# Patient Record
Sex: FEMALE | Race: WHITE | Employment: FULL TIME | URBAN - METROPOLITAN AREA
[De-identification: names, ages, dates, MRNs, and addresses within clinical notes are randomized per-mention and may not be internally consistent; named-entity substitution may affect disease eponyms.]

---

## 2017-02-07 DIAGNOSIS — J45.20 REACTIVE AIRWAY DISEASE, MILD INTERMITTENT, UNCOMPLICATED: ICD-10-CM

## 2017-02-08 RX ORDER — ALBUTEROL SULFATE 90 UG/1
2 AEROSOL, METERED RESPIRATORY (INHALATION) EVERY 6 HOURS PRN
Qty: 42.5 G | Refills: 1 | Status: SHIPPED | OUTPATIENT
Start: 2017-02-08 | End: 2017-11-17 | Stop reason: SDUPTHER

## 2017-03-13 DIAGNOSIS — F32.0 MILD SINGLE CURRENT EPISODE OF MAJOR DEPRESSIVE DISORDER (HCC): ICD-10-CM

## 2017-03-13 DIAGNOSIS — F32.A DEPRESSION: ICD-10-CM

## 2017-03-13 RX ORDER — DULOXETIN HYDROCHLORIDE 30 MG/1
CAPSULE, DELAYED RELEASE ORAL
Qty: 90 CAPSULE | Refills: 0 | Status: SHIPPED | OUTPATIENT
Start: 2017-03-13 | End: 2017-03-13

## 2017-03-13 RX ORDER — DULOXETIN HYDROCHLORIDE 60 MG/1
CAPSULE, DELAYED RELEASE ORAL
Qty: 90 CAPSULE | Refills: 0 | Status: SHIPPED | OUTPATIENT
Start: 2017-03-13 | End: 2017-06-12 | Stop reason: SDUPTHER

## 2017-03-24 ENCOUNTER — OFFICE VISIT (OUTPATIENT)
Dept: FAMILY MEDICINE CLINIC | Age: 37
End: 2017-03-24

## 2017-03-24 VITALS
SYSTOLIC BLOOD PRESSURE: 116 MMHG | BODY MASS INDEX: 40.66 KG/M2 | OXYGEN SATURATION: 95 % | WEIGHT: 253 LBS | TEMPERATURE: 97.8 F | HEART RATE: 128 BPM | DIASTOLIC BLOOD PRESSURE: 84 MMHG | HEIGHT: 66 IN

## 2017-03-24 DIAGNOSIS — E78.5 HYPERLIPIDEMIA, UNSPECIFIED HYPERLIPIDEMIA TYPE: ICD-10-CM

## 2017-03-24 DIAGNOSIS — Z30.011 ENCOUNTER FOR INITIAL PRESCRIPTION OF CONTRACEPTIVE PILLS: ICD-10-CM

## 2017-03-24 DIAGNOSIS — F32.0 MILD SINGLE CURRENT EPISODE OF MAJOR DEPRESSIVE DISORDER (HCC): ICD-10-CM

## 2017-03-24 DIAGNOSIS — J01.10 ACUTE FRONTAL SINUSITIS, RECURRENCE NOT SPECIFIED: Primary | ICD-10-CM

## 2017-03-24 PROCEDURE — 99214 OFFICE O/P EST MOD 30 MIN: CPT | Performed by: NURSE PRACTITIONER

## 2017-03-24 RX ORDER — AZITHROMYCIN 250 MG/1
TABLET, FILM COATED ORAL
Qty: 1 PACKET | Refills: 0 | Status: SHIPPED | OUTPATIENT
Start: 2017-03-24 | End: 2018-05-18 | Stop reason: SDUPTHER

## 2017-03-24 RX ORDER — NORETHINDRONE ACETATE AND ETHINYL ESTRADIOL 1MG-20(21)
1 KIT ORAL DAILY
Qty: 1 PACKET | Refills: 3 | Status: SHIPPED | OUTPATIENT
Start: 2017-03-24 | End: 2017-06-30 | Stop reason: ALTCHOICE

## 2017-03-24 ASSESSMENT — ENCOUNTER SYMPTOMS
SINUS PRESSURE: 1
SWOLLEN GLANDS: 0
GASTROINTESTINAL NEGATIVE: 1
SHORTNESS OF BREATH: 0
EYES NEGATIVE: 1
SORE THROAT: 0
HOARSE VOICE: 0
RESPIRATORY NEGATIVE: 1
COUGH: 0
ALLERGIC/IMMUNOLOGIC NEGATIVE: 1

## 2017-06-12 DIAGNOSIS — F32.A DEPRESSION: ICD-10-CM

## 2017-06-12 RX ORDER — DULOXETIN HYDROCHLORIDE 30 MG/1
CAPSULE, DELAYED RELEASE ORAL
Qty: 90 CAPSULE | Refills: 0 | Status: SHIPPED | OUTPATIENT
Start: 2017-06-12 | End: 2017-09-14 | Stop reason: SDUPTHER

## 2017-06-12 RX ORDER — DULOXETIN HYDROCHLORIDE 60 MG/1
CAPSULE, DELAYED RELEASE ORAL
Qty: 90 CAPSULE | Refills: 0 | Status: SHIPPED | OUTPATIENT
Start: 2017-06-12 | End: 2017-12-29 | Stop reason: ALTCHOICE

## 2017-06-30 ENCOUNTER — OFFICE VISIT (OUTPATIENT)
Dept: FAMILY MEDICINE CLINIC | Age: 37
End: 2017-06-30

## 2017-06-30 VITALS
OXYGEN SATURATION: 99 % | HEART RATE: 100 BPM | DIASTOLIC BLOOD PRESSURE: 72 MMHG | TEMPERATURE: 98.8 F | BODY MASS INDEX: 41.75 KG/M2 | SYSTOLIC BLOOD PRESSURE: 116 MMHG | WEIGHT: 259.8 LBS | HEIGHT: 66 IN

## 2017-06-30 DIAGNOSIS — F32.0 MILD SINGLE CURRENT EPISODE OF MAJOR DEPRESSIVE DISORDER (HCC): ICD-10-CM

## 2017-06-30 DIAGNOSIS — J30.9 ALLERGIC RHINITIS, UNSPECIFIED ALLERGIC RHINITIS TRIGGER, UNSPECIFIED RHINITIS SEASONALITY: ICD-10-CM

## 2017-06-30 DIAGNOSIS — R21 RASH: Primary | ICD-10-CM

## 2017-06-30 PROCEDURE — 99214 OFFICE O/P EST MOD 30 MIN: CPT | Performed by: NURSE PRACTITIONER

## 2017-06-30 RX ORDER — CLOTRIMAZOLE AND BETAMETHASONE DIPROPIONATE 10; .64 MG/G; MG/G
CREAM TOPICAL
Qty: 1 TUBE | Refills: 1 | Status: SHIPPED | OUTPATIENT
Start: 2017-06-30 | End: 2017-09-22 | Stop reason: SDUPTHER

## 2017-06-30 RX ORDER — MONTELUKAST SODIUM 10 MG/1
10 TABLET ORAL DAILY
Qty: 30 TABLET | Refills: 3 | Status: SHIPPED | OUTPATIENT
Start: 2017-06-30 | End: 2017-11-26 | Stop reason: SDUPTHER

## 2017-06-30 ASSESSMENT — ENCOUNTER SYMPTOMS
GASTROINTESTINAL NEGATIVE: 1
RHINORRHEA: 0
EYES NEGATIVE: 1
ALLERGIC/IMMUNOLOGIC NEGATIVE: 1
SHORTNESS OF BREATH: 0
EYE PAIN: 0
NAIL CHANGES: 0
VOMITING: 0
COUGH: 1
SORE THROAT: 0
DIARRHEA: 0

## 2017-07-03 ENCOUNTER — PATIENT MESSAGE (OUTPATIENT)
Dept: FAMILY MEDICINE CLINIC | Age: 37
End: 2017-07-03

## 2017-07-03 DIAGNOSIS — F32.0 MILD SINGLE CURRENT EPISODE OF MAJOR DEPRESSIVE DISORDER (HCC): Primary | ICD-10-CM

## 2017-09-14 DIAGNOSIS — F32.A DEPRESSION: ICD-10-CM

## 2017-09-14 RX ORDER — DULOXETIN HYDROCHLORIDE 30 MG/1
CAPSULE, DELAYED RELEASE ORAL
Qty: 90 CAPSULE | Refills: 0 | Status: SHIPPED | OUTPATIENT
Start: 2017-09-14 | End: 2017-12-26 | Stop reason: SDUPTHER

## 2017-09-14 RX ORDER — DULOXETIN HYDROCHLORIDE 60 MG/1
CAPSULE, DELAYED RELEASE ORAL
Qty: 90 CAPSULE | Refills: 0 | Status: SHIPPED | OUTPATIENT
Start: 2017-09-14 | End: 2017-09-22 | Stop reason: SDUPTHER

## 2017-09-22 ENCOUNTER — OFFICE VISIT (OUTPATIENT)
Dept: FAMILY MEDICINE CLINIC | Age: 37
End: 2017-09-22

## 2017-09-22 VITALS
SYSTOLIC BLOOD PRESSURE: 122 MMHG | OXYGEN SATURATION: 98 % | HEART RATE: 98 BPM | WEIGHT: 269 LBS | TEMPERATURE: 97.9 F | BODY MASS INDEX: 43.23 KG/M2 | DIASTOLIC BLOOD PRESSURE: 82 MMHG | HEIGHT: 66 IN

## 2017-09-22 DIAGNOSIS — J30.9 ALLERGIC RHINITIS, UNSPECIFIED ALLERGIC RHINITIS TRIGGER, UNSPECIFIED RHINITIS SEASONALITY: ICD-10-CM

## 2017-09-22 DIAGNOSIS — F32.0 MILD SINGLE CURRENT EPISODE OF MAJOR DEPRESSIVE DISORDER (HCC): Primary | ICD-10-CM

## 2017-09-22 DIAGNOSIS — E78.5 HYPERLIPIDEMIA, UNSPECIFIED HYPERLIPIDEMIA TYPE: ICD-10-CM

## 2017-09-22 PROCEDURE — 99214 OFFICE O/P EST MOD 30 MIN: CPT | Performed by: NURSE PRACTITIONER

## 2017-09-22 RX ORDER — CLOTRIMAZOLE AND BETAMETHASONE DIPROPIONATE 10; .64 MG/G; MG/G
CREAM TOPICAL
Qty: 1 TUBE | Refills: 1 | Status: SHIPPED | OUTPATIENT
Start: 2017-09-22 | End: 2022-02-17

## 2017-09-22 ASSESSMENT — ENCOUNTER SYMPTOMS
EYES NEGATIVE: 1
GASTROINTESTINAL NEGATIVE: 1
SHORTNESS OF BREATH: 0
RESPIRATORY NEGATIVE: 1

## 2017-10-10 ENCOUNTER — OFFICE VISIT (OUTPATIENT)
Dept: FAMILY MEDICINE CLINIC | Age: 37
End: 2017-10-10

## 2017-10-10 VITALS
DIASTOLIC BLOOD PRESSURE: 108 MMHG | HEIGHT: 66 IN | TEMPERATURE: 97.7 F | HEART RATE: 102 BPM | WEIGHT: 263.4 LBS | OXYGEN SATURATION: 97 % | SYSTOLIC BLOOD PRESSURE: 146 MMHG | BODY MASS INDEX: 42.33 KG/M2

## 2017-10-10 DIAGNOSIS — I15.9 SECONDARY HYPERTENSION: ICD-10-CM

## 2017-10-10 DIAGNOSIS — R51.9 NONINTRACTABLE HEADACHE, UNSPECIFIED CHRONICITY PATTERN, UNSPECIFIED HEADACHE TYPE: Primary | ICD-10-CM

## 2017-10-10 DIAGNOSIS — F41.9 ANXIETY: ICD-10-CM

## 2017-10-10 PROCEDURE — 99214 OFFICE O/P EST MOD 30 MIN: CPT | Performed by: NURSE PRACTITIONER

## 2017-10-10 RX ORDER — INFLUENZA VIRUS VACCINE 15; 15; 15; 15 UG/.5ML; UG/.5ML; UG/.5ML; UG/.5ML
SUSPENSION INTRAMUSCULAR
Refills: 0 | COMMUNITY
Start: 2017-09-27 | End: 2017-10-10 | Stop reason: ALTCHOICE

## 2017-10-10 RX ORDER — METOPROLOL SUCCINATE 25 MG/1
25 TABLET, EXTENDED RELEASE ORAL DAILY
Qty: 30 TABLET | Refills: 3 | Status: SHIPPED | OUTPATIENT
Start: 2017-10-10 | End: 2017-10-31

## 2017-10-10 RX ORDER — ACETAMINOPHEN 160 MG
2000 TABLET,DISINTEGRATING ORAL 2 TIMES DAILY
COMMUNITY
End: 2021-05-17

## 2017-10-10 RX ORDER — BUTALBITAL, ACETAMINOPHEN AND CAFFEINE 50; 325; 40 MG/1; MG/1; MG/1
1 TABLET ORAL EVERY 4 HOURS PRN
Qty: 180 TABLET | Refills: 3 | Status: SHIPPED | OUTPATIENT
Start: 2017-10-10 | End: 2021-08-13 | Stop reason: ALTCHOICE

## 2017-10-10 ASSESSMENT — ENCOUNTER SYMPTOMS
PHOTOPHOBIA: 0
SWOLLEN GLANDS: 0
SHORTNESS OF BREATH: 0
BACK PAIN: 0
RHINORRHEA: 0
VISUAL CHANGE: 0
EYE PAIN: 0
NAUSEA: 1
FACIAL SWEATING: 0
SCALP TENDERNESS: 1
SORE THROAT: 0
BLURRED VISION: 0
VOMITING: 0
EYES NEGATIVE: 1
COUGH: 0
SINUS PRESSURE: 0
ABDOMINAL PAIN: 0
EYE WATERING: 0
RESPIRATORY NEGATIVE: 1
ORTHOPNEA: 0
EYE REDNESS: 0
ALLERGIC/IMMUNOLOGIC NEGATIVE: 1

## 2017-10-10 NOTE — PROGRESS NOTES
10/10/17    Sang Santos  1980      Chief Complaint   Patient presents with    Headache     x 3 started 3 weeks ago     Hypertension    Anxiety       Vitals:    10/10/17 1142   BP: (!) 146/108   Pulse: 102   Temp:    SpO2:          Immunization History   Administered Date(s) Administered    Influenza Virus Vaccine 09/13/2011, 10/08/2012    Influenza, Deandre Pope, 3 Years and older, IM 09/21/2016, 09/27/2017    MMR 06/17/2015    Tdap (Boostrix, Adacel) 09/16/2014       Allergies   Allergen Reactions    Fexofenadine-Pseudoephed Er      The decongestant part    Penicillins     Sulfa Antibiotics      Outpatient Prescriptions Marked as Taking for the 10/10/17 encounter (Office Visit) with Jay Fisher NP   Medication Sig Dispense Refill    Fluticasone Propionate (FLONASE NA) by Nasal route      Cholecalciferol (VITAMIN D3) 2000 units CAPS Take 2,000 Units by mouth 2 times daily      metoprolol succinate (TOPROL XL) 25 MG extended release tablet Take 1 tablet by mouth daily 30 tablet 3    butalbital-acetaminophen-caffeine (FIORICET, ESGIC) -40 MG per tablet Take 1 tablet by mouth every 4 hours as needed for Headaches 180 tablet 3    clotrimazole-betamethasone (LOTRISONE) 1-0.05 % cream Apply topically 2 times daily. 1 Tube 1    DULoxetine (CYMBALTA) 30 MG extended release capsule TAKE ONE CAPSULE BY MOUTH EVERY DAY 90 capsule 0    montelukast (SINGULAIR) 10 MG tablet Take 1 tablet by mouth daily 30 tablet 3    DULoxetine (CYMBALTA) 60 MG extended release capsule TAKE 1 CAPSULE BY MOUTH DAILY 90 capsule 0    albuterol sulfate HFA (PROAIR HFA) 108 (90 BASE) MCG/ACT inhaler Inhale 2 puffs into the lungs every 6 hours as needed for Wheezing or Shortness of Breath 42.5 g 1    cetirizine (ZYRTEC) 10 MG tablet Take 10 mg by mouth daily.          Past Medical History:   Diagnosis Date    Allergic rhinitis     Asthma     Depression 5/19/2016    GERD (gastroesophageal reflux disease)     Pyloric stenosis      Past Surgical History:   Procedure Laterality Date    WISDOM TOOTH EXTRACTION  early 19's    x 4     Family History   Problem Relation Age of Onset    Diabetes Mother     High Cholesterol Mother     Cancer Father      Social History     Social History    Marital status:      Spouse name: N/A    Number of children: N/A    Years of education: N/A     Occupational History    Not on file. Social History Main Topics    Smoking status: Never Smoker    Smokeless tobacco: Never Used    Alcohol use No      Comment: occ    Drug use: No    Sexual activity: Yes     Partners: Male     Other Topics Concern    Not on file     Social History Narrative    No narrative on file         Any recent diagnostic tests, hospital reports, office notes or consultation letters were reviewed prior to and during the visit. Headache    This is a recurrent problem. The current episode started 1 to 4 weeks ago. The problem occurs intermittently. The pain is located in the frontal, vertex and retro-orbital region. The pain does not radiate. The quality of the pain is described as throbbing. The pain is at a severity of 8/10. Associated symptoms include nausea and scalp tenderness. Pertinent negatives include no abdominal pain, abnormal behavior, anorexia, back pain, blurred vision, coughing, dizziness, drainage, ear pain, eye pain, eye redness, eye watering, facial sweating, fever, hearing loss, insomnia, loss of balance, muscle aches, neck pain, numbness, phonophobia, photophobia, rhinorrhea, seizures, sinus pressure, sore throat, swollen glands, tingling, tinnitus, visual change, vomiting, weakness or weight loss. She has tried Excedrin for the symptoms. The treatment provided no relief. Hypertension   The current episode started today. The problem is unchanged. Associated symptoms include anxiety and headaches.  Pertinent negatives include no blurred vision, chest pain, malaise/fatigue, neck

## 2017-10-10 NOTE — LETTER
[facility]  [address] [place] [address]  Phone: [phone]  Fax: [phone]    Dalton Pope NP        October 10, 2017     Patient: Jace Wright   YOB: 1980   Date of Visit: 10/10/2017       To Whom It May Concern: It is my medical opinion that Colten Mckoy should remain out of work 10/9 - 10/11/2017 due to illness. If you have any questions or concerns, please don't hesitate to call.     Sincerely,        Dalton Pope NP

## 2017-10-27 ENCOUNTER — TELEPHONE (OUTPATIENT)
Dept: FAMILY MEDICINE CLINIC | Age: 37
End: 2017-10-27

## 2017-10-31 ENCOUNTER — OFFICE VISIT (OUTPATIENT)
Dept: FAMILY MEDICINE CLINIC | Age: 37
End: 2017-10-31

## 2017-10-31 VITALS
OXYGEN SATURATION: 98 % | HEIGHT: 66 IN | WEIGHT: 266.8 LBS | BODY MASS INDEX: 42.88 KG/M2 | DIASTOLIC BLOOD PRESSURE: 90 MMHG | SYSTOLIC BLOOD PRESSURE: 140 MMHG | TEMPERATURE: 98.9 F | HEART RATE: 107 BPM

## 2017-10-31 DIAGNOSIS — R00.2 PALPITATIONS: ICD-10-CM

## 2017-10-31 DIAGNOSIS — I10 HYPERTENSION, UNSPECIFIED TYPE: ICD-10-CM

## 2017-10-31 DIAGNOSIS — R51.9 NONINTRACTABLE HEADACHE, UNSPECIFIED CHRONICITY PATTERN, UNSPECIFIED HEADACHE TYPE: Primary | ICD-10-CM

## 2017-10-31 PROCEDURE — 99214 OFFICE O/P EST MOD 30 MIN: CPT | Performed by: NURSE PRACTITIONER

## 2017-10-31 RX ORDER — METOPROLOL SUCCINATE 50 MG/1
50 TABLET, EXTENDED RELEASE ORAL DAILY
Qty: 30 TABLET | Refills: 3 | Status: SHIPPED | OUTPATIENT
Start: 2017-10-31 | End: 2018-06-19

## 2017-11-01 PROBLEM — R00.2 PALPITATIONS: Status: ACTIVE | Noted: 2017-11-01

## 2017-11-01 PROBLEM — R51.9 NONINTRACTABLE HEADACHE: Status: ACTIVE | Noted: 2017-11-01

## 2017-11-01 PROBLEM — I10 HYPERTENSION: Status: ACTIVE | Noted: 2017-11-01

## 2017-11-01 ASSESSMENT — ENCOUNTER SYMPTOMS
ALLERGIC/IMMUNOLOGIC NEGATIVE: 1
SORE THROAT: 0
SWOLLEN GLANDS: 0
VISUAL CHANGE: 1
COUGH: 0
NAUSEA: 1
SINUS PRESSURE: 0
VOMITING: 0
SCALP TENDERNESS: 0
ORTHOPNEA: 0
RHINORRHEA: 0
ABDOMINAL PAIN: 0
RESPIRATORY NEGATIVE: 1
EYE WATERING: 0
EYE PAIN: 0
PHOTOPHOBIA: 1
SHORTNESS OF BREATH: 0
EYE REDNESS: 0
BLURRED VISION: 1
BACK PAIN: 0
FACIAL SWEATING: 0

## 2017-11-02 ENCOUNTER — TELEPHONE (OUTPATIENT)
Dept: FAMILY MEDICINE CLINIC | Age: 37
End: 2017-11-02

## 2017-11-05 NOTE — PROGRESS NOTES
I have reviewed the above note.  I agree with the assessment and treatment plan
stenosis      Past Surgical History:   Procedure Laterality Date    WISDOM TOOTH EXTRACTION  early 19's    x 4     Family History   Problem Relation Age of Onset    Diabetes Mother     High Cholesterol Mother     Cancer Father      Social History     Social History    Marital status:      Spouse name: N/A    Number of children: N/A    Years of education: N/A     Occupational History    Not on file. Social History Main Topics    Smoking status: Never Smoker    Smokeless tobacco: Never Used    Alcohol use No      Comment: occ    Drug use: No    Sexual activity: Yes     Partners: Male     Other Topics Concern    Not on file     Social History Narrative    No narrative on file         Any recent diagnostic tests, hospital reports, office notes or consultation letters were reviewed prior to and during the visit. Headache    This is a recurrent problem. The current episode started more than 1 month ago. The problem has been gradually improving. The pain is located in the frontal region. The pain does not radiate. The quality of the pain is described as aching and throbbing. Associated symptoms include blurred vision, dizziness, nausea, photophobia and a visual change. Pertinent negatives include no abdominal pain, abnormal behavior, anorexia, back pain, coughing, drainage, ear pain, eye pain, eye redness, eye watering, facial sweating, fever, hearing loss, insomnia, loss of balance, muscle aches, neck pain, numbness, phonophobia, rhinorrhea, scalp tenderness, seizures, sinus pressure, sore throat, swollen glands, tingling, tinnitus, vomiting, weakness or weight loss. She has tried beta blockers (fioricet) for the symptoms. The treatment provided moderate relief. Hypertension   This is a recurrent problem. The current episode started more than 1 month ago. Associated symptoms include blurred vision, headaches and palpitations.  Pertinent negatives include no anxiety, chest pain,

## 2017-11-07 ENCOUNTER — TELEPHONE (OUTPATIENT)
Dept: FAMILY MEDICINE CLINIC | Age: 37
End: 2017-11-07

## 2017-11-07 ENCOUNTER — TELEPHONE (OUTPATIENT)
Dept: PSYCHOLOGY | Age: 37
End: 2017-11-07

## 2017-11-07 ENCOUNTER — OFFICE VISIT (OUTPATIENT)
Dept: PSYCHOLOGY | Age: 37
End: 2017-11-07

## 2017-11-07 DIAGNOSIS — F33.1 MAJOR DEPRESSIVE DISORDER, RECURRENT EPISODE, MODERATE WITH ANXIOUS DISTRESS (HCC): Primary | ICD-10-CM

## 2017-11-07 DIAGNOSIS — F41.1 GENERALIZED ANXIETY DISORDER: ICD-10-CM

## 2017-11-07 PROCEDURE — 90791 PSYCH DIAGNOSTIC EVALUATION: CPT | Performed by: PSYCHOLOGIST

## 2017-11-07 RX ORDER — ESCITALOPRAM OXALATE 10 MG/1
10 TABLET ORAL DAILY
Qty: 30 TABLET | Refills: 3 | Status: SHIPPED | OUTPATIENT
Start: 2017-11-07 | End: 2018-01-05

## 2017-11-07 ASSESSMENT — PATIENT HEALTH QUESTIONNAIRE - PHQ9
1. LITTLE INTEREST OR PLEASURE IN DOING THINGS: 3
3. TROUBLE FALLING OR STAYING ASLEEP: 3
SUM OF ALL RESPONSES TO PHQ9 QUESTIONS 1 & 2: 5
9. THOUGHTS THAT YOU WOULD BE BETTER OFF DEAD, OR OF HURTING YOURSELF: 1
SUM OF ALL RESPONSES TO PHQ QUESTIONS 1-9: 19
5. POOR APPETITE OR OVEREATING: 2
2. FEELING DOWN, DEPRESSED OR HOPELESS: 2
4. FEELING TIRED OR HAVING LITTLE ENERGY: 3
6. FEELING BAD ABOUT YOURSELF - OR THAT YOU ARE A FAILURE OR HAVE LET YOURSELF OR YOUR FAMILY DOWN: 3
8. MOVING OR SPEAKING SO SLOWLY THAT OTHER PEOPLE COULD HAVE NOTICED. OR THE OPPOSITE, BEING SO FIGETY OR RESTLESS THAT YOU HAVE BEEN MOVING AROUND A LOT MORE THAN USUAL: 2

## 2017-11-07 ASSESSMENT — ANXIETY QUESTIONNAIRES
1. FEELING NERVOUS, ANXIOUS, OR ON EDGE: 2-OVER HALF THE DAYS
GAD7 TOTAL SCORE: 17
2. NOT BEING ABLE TO STOP OR CONTROL WORRYING: 3-NEARLY EVERY DAY
5. BEING SO RESTLESS THAT IT IS HARD TO SIT STILL: 2-OVER HALF THE DAYS
7. FEELING AFRAID AS IF SOMETHING AWFUL MIGHT HAPPEN: 2-OVER HALF THE DAYS
4. TROUBLE RELAXING: 3-NEARLY EVERY DAY
3. WORRYING TOO MUCH ABOUT DIFFERENT THINGS: 3-NEARLY EVERY DAY
6. BECOMING EASILY ANNOYED OR IRRITABLE: 2-OVER HALF THE DAYS

## 2017-11-07 NOTE — TELEPHONE ENCOUNTER
I will be happy to do that as long as she realizes that SSRIs are not know to help with pain as Cymbalta is.

## 2017-11-07 NOTE — PATIENT INSTRUCTIONS
Goals: 1. Go to work every day  2. Practice being mindful - paying attention to the present moment in a nonjudgmental way  3. Practice diaphragmatic breathing throughout the day  4. Go for walks (short or long) to manage stress        Depression: Facts, Myths & Tips for Feeling Better    Facts  Depression is very common  Nearly 20% of the [place] population experiences a significant depression during their lifetime. Depression is treatable  Because depression affects so many, and can have such a powerful and negative impact on life, there has been an enormous amount of research conducted on how to reduce symptoms and improve functioning. As a result, we now know there are behaviors YOU can engage in to make yourself feel better. Depression is not a weakness  People suffer from depression for a variety of reasons, biological, environmental and behavioral.  Research indicates that [employer] is NOT one of the reasons people become depressed. Depression is not something you are powerless against  Evidence suggests that you can directly impact the intensity and duration of depression by what you do and by altering the way you think about certain things. The Downward Spiral  Depression often begins as a drop in mood due to an environmental or biological trigger that makes people feel less like being active. Being less active, in turn, often causes people to experience an even lower mood and feel even less like being active, and so the cycle begins. 1. Low mood begins and you feel like doing less. 2. You stop doing the things you enjoy and become less active. 3. Your low mood becomes even lower and you feel like doing even less. 4. Cycle continues until you begin to completely isolate and cease all pleasurable activities OR you reverse the cycle. How Can I Start Feeling Better? The first and best way to reverse the downward cycle is to get active!  Your body produces its own anti-depressants every there is rarely one single cause of depression. It is probable that if you are depressed, many of the causes described above are playing some role to some degree. Therefore, the more coping strategies you adopt over time to address the various causes of depression described above, the more successful you will be in reducing your depression. MINDFULNESS    Mindfulness means paying attention in a particular way: on purpose, in the present moment, and non-judgmentally. Wicho Lawrence    \"Mindfulness is the basic human ability to be fully present, aware of where we are and what were doing, and not overly reactive or overwhelmed by whats going on around us. \"   -Mindful [place]    Paying attention on purpose  Mindfulness involves paying attention on purpose. Mindfulness involves a conscious direction of our awareness. This can mean purposefully directing our attention to the breath, or a particular emotion, or an activity as simple as eating. Doing so allows us to actively shape the mind. Paying attention in the present moment  Left to itself, the mind wanders through all kinds of thoughts  including thoughts expressing anger, craving, depression, self-pity, and anxiety. As we indulge in these kinds of thoughts, we reinforce those emotions and cause ourselves to suffer. These thoughts usually center around the past or future. But the past no longer exists. The future is just a fantasy until it happens. The one moment we actually can experience  the present moment  is the one we seem most to avoid. By purposefully directing our awareness away from thoughts about the past or future and instead towards the [address] - our present moment experience - we decrease the effect of these thoughts on our lives. Paying attention non-judgmentally  Mindfulness is an emotionally non-reactive state. We don't  that this experience is good and that one is bad.  Or, if we do make those judgments, we dont get upset in reaction to our experience. We simply notice it arising, observe it mindfully, and allow it to pass through us. When practicing mindfulness, we may be aware that certain experiences are pleasant and some are unpleasant, but on an emotional level we dont react. Resources  · \"Wherever You Go, There You Are: Mindfulness Meditation in Everyday Life\" - by Neeta Casillas  · \"The Miracle of Mindfulness: An Introduction to the Practice of Meditation\" - by Lu Aguirre  · \"The Power of Now: A Guide to Spiritual Enlightenment\" - by Lupis Tapia  · \"The Mindfulness Solution: Everyday Practices for Everyday Problems\" - by Josephine Casas    Ways to Practice Mindfulness  · Pay attention to your breathing  · Take a mindful walk  · Eat mindfully  · Ground yourself in your five senses  · Journal  · Try dishwashing, cleaning and doing laundry a little bit slower than you usually do  · Meditate      Diaphragmatic Breathing    \"The entire autonomic nervous system (and through it, our internal organs and glands) is largely driven by our breathing patterns. By changing our breathing we can influence millions of biochemical reactions in our body, producing more relaxing substances such as endorphins and fewer anxiety-producing ones like adrenaline and higher blood acidity. Mindfulness of the breath is so effective that it is common to all meditative and prayer traditions. \" Anxiety Fear & Breathing - Breathing. com    \"When overcoming high levels of anxiety, it is important to learn the techniques of correct breathing. Many people who live with high levels of anxiety are known to breathe through their chest. Shallow breathing through the chest means you are disrupting the balance of oxygen and carbon dioxide necessary to be in a relaxed state. This type of breathing will perpetuate the symptoms of anxiety. \" HealthyPlace. com      What Is Diaphragmatic Breathing?    Diaphragmatic breathing is a technique that helps

## 2017-11-07 NOTE — PROGRESS NOTES
Behavioral Health Consultation  Waqar Shultz Psy.D. Psychologist  2017  8:25 AM      Time spent with Patient: 40 minutes  This is patient's first [facility] appointment. Reason for Consult:  Depression, anxiety  Referring Provider: Randy Tobias, NP  [address] [address] [facility]  [place], [address]    Pt provided informed consent for the behavioral health program. Discussed with patient model of service to include the limits of confidentiality (i.e. abuse reporting, suicide intervention, etc.) and short-term intervention focused approach. Pt indicated understanding. Feedback given to PCP. S:  Pt Deon Drivers) reported that her depression has worsened over the past \"while. \" Saw Randy Tobias NP about depression sometime last year. Has been taking duloxetine 90mg. Initially gave her more energy but doesn't find it helpful anymore. Unhappy in marriage but having difficulty leaving. No longer feels compatible with  but feels responsible for him. Main depressive symptoms include fatigue, amotivation, increased isolation, and becoming more quiet. First depressive episode 2 years ago after she lost her job at [employer]. Has had some periods of euthymia since then. Has always been an anxious person. Likes to joke that she has OCD, very organized. More anxious since becoming depressed and changing jobs. Works at [employer] as a  in [employer]. Loves her job, but didn't have the energy or motivation to go to work 4 days last week. Parents are . Sister lives in [place]. Has 3 close friends from childhood, confided in them last year. Didn't feel as supported by these friends as she would have hoped. Also has a close male friend at work, feels supported by him. Has had thoughts about what it would like to go to sleep and not wake up. No SI/HI, plan or intent. No past attempts.       O:  MSE:  Appearance: good hygiene and appropriate attire  Attitude: cooperative and moderate distress  Consciousness: alert  Orientation: oriented to person, place, time, general circumstance  Memory: recent and remote memory intact  Attention/Concentration: intact during session  Psychomotor Activity: normal  Eye Contact: normal  Speech: normal rate and volume, well-articulated, verbose  Mood: dyshporic and anxious  Affect: constricted  Perception: within normal limits  Thought Content: within normal limits   Thought Process: logical, goal-directed, coherent, circumstantial  Insight: fair  Judgment: intact  Morbid ideation: passive thoughts of death  Suicide Assessment: no suicidal ideation      History:    Medications:   Current Outpatient Prescriptions   Medication Sig Dispense Refill    metoprolol succinate (TOPROL XL) 50 MG extended release tablet Take 1 tablet by mouth daily 30 tablet 3    Fluticasone Propionate (FLONASE NA) by Nasal route      Cholecalciferol (VITAMIN D3) 2000 units CAPS Take 2,000 Units by mouth 2 times daily      butalbital-acetaminophen-caffeine (FIORICET, ESGIC) -40 MG per tablet Take 1 tablet by mouth every 4 hours as needed for Headaches 180 tablet 3    clotrimazole-betamethasone (LOTRISONE) 1-0.05 % cream Apply topically 2 times daily. 1 Tube 1    DULoxetine (CYMBALTA) 30 MG extended release capsule TAKE ONE CAPSULE BY MOUTH EVERY DAY 90 capsule 0    montelukast (SINGULAIR) 10 MG tablet Take 1 tablet by mouth daily 30 tablet 3    DULoxetine (CYMBALTA) 60 MG extended release capsule TAKE 1 CAPSULE BY MOUTH DAILY 90 capsule 0    albuterol sulfate HFA (PROAIR HFA) 108 (90 BASE) MCG/ACT inhaler Inhale 2 puffs into the lungs every 6 hours as needed for Wheezing or Shortness of Breath 42.5 g 1    cetirizine (ZYRTEC) 10 MG tablet Take 10 mg by mouth daily. No current facility-administered medications for this visit.         Social History:   Social History     Social History    Marital status:      Spouse name: N/A    Number of children: N/A    Years of weeks.    Follow-Up  Will share recommendation to switch pt from Cymbalta to SSRI with pt's PCP.

## 2017-11-10 ENCOUNTER — TELEPHONE (OUTPATIENT)
Dept: FAMILY MEDICINE CLINIC | Age: 37
End: 2017-11-10

## 2017-11-10 NOTE — TELEPHONE ENCOUNTER
Spoke with pt and she stated ever since [place] put her on the allergy medication that she doesn't need to use her inhalers as much. Pt states she doesn't need 4 every 90 days that is what the pharmacy gave her last time. She has only used 3 inhalers and has 1 left but she wanted to let you know that she keeps her inhalers at different places incase of a flare like work, car, etc. Please Advise.

## 2017-11-10 NOTE — TELEPHONE ENCOUNTER
Please call the patient and see if she is using 4 every 90 days. That is WAY too much. She needs to be on a daily preventative is she needs her rescue inhaler that much.

## 2017-11-17 ENCOUNTER — OFFICE VISIT (OUTPATIENT)
Dept: PSYCHOLOGY | Age: 37
End: 2017-11-17

## 2017-11-17 DIAGNOSIS — F41.1 GENERALIZED ANXIETY DISORDER: ICD-10-CM

## 2017-11-17 DIAGNOSIS — F33.1 MAJOR DEPRESSIVE DISORDER, RECURRENT EPISODE, MODERATE WITH ANXIOUS DISTRESS (HCC): Primary | ICD-10-CM

## 2017-11-17 DIAGNOSIS — J45.20 REACTIVE AIRWAY DISEASE, MILD INTERMITTENT, UNCOMPLICATED: ICD-10-CM

## 2017-11-17 PROCEDURE — 90832 PSYTX W PT 30 MINUTES: CPT | Performed by: PSYCHOLOGIST

## 2017-11-17 RX ORDER — ALBUTEROL SULFATE 90 UG/1
2 AEROSOL, METERED RESPIRATORY (INHALATION) EVERY 6 HOURS PRN
Qty: 3 INHALER | Refills: 1 | Status: SHIPPED | OUTPATIENT
Start: 2017-11-17 | End: 2018-05-12 | Stop reason: SDUPTHER

## 2017-11-17 ASSESSMENT — ANXIETY QUESTIONNAIRES
1. FEELING NERVOUS, ANXIOUS, OR ON EDGE: 2-OVER HALF THE DAYS
3. WORRYING TOO MUCH ABOUT DIFFERENT THINGS: 2-OVER HALF THE DAYS
GAD7 TOTAL SCORE: 11
4. TROUBLE RELAXING: 2-OVER HALF THE DAYS
2. NOT BEING ABLE TO STOP OR CONTROL WORRYING: 2-OVER HALF THE DAYS
6. BECOMING EASILY ANNOYED OR IRRITABLE: 2-OVER HALF THE DAYS
7. FEELING AFRAID AS IF SOMETHING AWFUL MIGHT HAPPEN: 1-SEVERAL DAYS
5. BEING SO RESTLESS THAT IT IS HARD TO SIT STILL: 0-NOT AT ALL SURE

## 2017-11-17 ASSESSMENT — PATIENT HEALTH QUESTIONNAIRE - PHQ9
1. LITTLE INTEREST OR PLEASURE IN DOING THINGS: 1
3. TROUBLE FALLING OR STAYING ASLEEP: 2
SUM OF ALL RESPONSES TO PHQ QUESTIONS 1-9: 13
8. MOVING OR SPEAKING SO SLOWLY THAT OTHER PEOPLE COULD HAVE NOTICED. OR THE OPPOSITE, BEING SO FIGETY OR RESTLESS THAT YOU HAVE BEEN MOVING AROUND A LOT MORE THAN USUAL: 0
5. POOR APPETITE OR OVEREATING: 3
9. THOUGHTS THAT YOU WOULD BE BETTER OFF DEAD, OR OF HURTING YOURSELF: 0
6. FEELING BAD ABOUT YOURSELF - OR THAT YOU ARE A FAILURE OR HAVE LET YOURSELF OR YOUR FAMILY DOWN: 2
7. TROUBLE CONCENTRATING ON THINGS, SUCH AS READING THE NEWSPAPER OR WATCHING TELEVISION: 1
2. FEELING DOWN, DEPRESSED OR HOPELESS: 2
4. FEELING TIRED OR HAVING LITTLE ENERGY: 2
SUM OF ALL RESPONSES TO PHQ9 QUESTIONS 1 & 2: 3

## 2017-11-17 NOTE — PATIENT INSTRUCTIONS
Goals: 1. Go to work every day  2. Practice being mindful - paying attention to the present moment in a nonjudgmental way  3. Practice diaphragmatic breathing throughout the day  4. Go for walks (short or long) to manage stress. Also consider finding a gym you can use.   5. Complete ABC sheets to challenge negative thoughts

## 2017-11-17 NOTE — PROGRESS NOTES
Behavioral Health Consultation  Gladys Garza Psy.D. Psychologist  11/17/2017  8:29 AM      Time spent with Patient: 35 minutes  This is patient's second [facility] appointment. Reason for Consult:  Depression, anxiety  Referring Provider: Gillian Aaron, [place]Y  [address] [address] [facility], [address]      S:  Pt Ozella Files) reported feeling better today. Has fully switched from Cymbalta to Lexapro. Believes Lexapro is already starting to help her feel \"less foggy,\" clearer, more upbeat and social. Attended her niece's birthday party, which she enjoyed. Not napping as much. Hasn't walked because it's dark when she gets home from work. Considering joining a gym. Increased appetite lately. Starting to think about ways to improve her life but is receiving feedback from others telling her not to push herself too hard too quickly. Discussed her relationship with a male friend. Becoming more aware of her tendency to push others away in order to protect herself.        O:  MSE:  Appearance: good hygiene and appropriate attire  Attitude: cooperative and mild distress, brighter  Consciousness: alert  Orientation: oriented to person, place, time, general circumstance  Memory: recent and remote memory intact  Attention/Concentration: intact during session  Psychomotor Activity: normal  Eye Contact: normal  Speech: normal rate and volume, well-articulated, verbose  Mood: mildly dyshporic and anxious  Affect: congruent with thought content and mood  Perception: within normal limits  Thought Content: within normal limits   Thought Process: logical, goal-directed, coherent, circumstantial  Insight: improving  Judgment: intact  Morbid ideation: no  Suicide Assessment: no suicidal ideation      History:    Medications:   Current Outpatient Prescriptions   Medication Sig Dispense Refill    escitalopram (LEXAPRO) 10 MG tablet Take 1 tablet by mouth daily 30 tablet 3    metoprolol succinate (TOPROL XL) 50 MG extended release tablet Take 1 tablet by mouth daily 30 tablet 3    Fluticasone Propionate (FLONASE NA) by Nasal route      Cholecalciferol (VITAMIN D3) 2000 units CAPS Take 2,000 Units by mouth 2 times daily      butalbital-acetaminophen-caffeine (FIORICET, ESGIC) -40 MG per tablet Take 1 tablet by mouth every 4 hours as needed for Headaches 180 tablet 3    clotrimazole-betamethasone (LOTRISONE) 1-0.05 % cream Apply topically 2 times daily. 1 Tube 1    DULoxetine (CYMBALTA) 30 MG extended release capsule TAKE ONE CAPSULE BY MOUTH EVERY DAY 90 capsule 0    montelukast (SINGULAIR) 10 MG tablet Take 1 tablet by mouth daily 30 tablet 3    DULoxetine (CYMBALTA) 60 MG extended release capsule TAKE 1 CAPSULE BY MOUTH DAILY 90 capsule 0    albuterol sulfate HFA (PROAIR HFA) 108 (90 BASE) MCG/ACT inhaler Inhale 2 puffs into the lungs every 6 hours as needed for Wheezing or Shortness of Breath 42.5 g 1    cetirizine (ZYRTEC) 10 MG tablet Take 10 mg by mouth daily. No current facility-administered medications for this visit. Social History:   Social History     Social History    Marital status:      Spouse name: N/A    Number of children: N/A    Years of education: N/A     Occupational History    Not on file. Social History Main Topics    Smoking status: Never Smoker    Smokeless tobacco: Never Used    Alcohol use No      Comment: occ    Drug use: No    Sexual activity: Yes     Partners: Male     Other Topics Concern    Not on file     Social History Narrative    No narrative on file       TOBACCO:   reports that she has never smoked. She has never used smokeless tobacco.  ETOH:   reports that she does not drink alcohol. Family History:   Family History   Problem Relation Age of Onset    Diabetes Mother     High Cholesterol Mother     Cancer Father        A:  Pt's distress has decreased since last visit. Reporting increased energy level, motivation, and social engagement. Napping less. process, Cognitive strategies to target maladaptive thoughts including ABC sheets, Identified relevant behavioral strategies for targeting symptom reduction including going to work daily, walking/exercise, diaphragmatic breathing, ABC sheets and Emphasized importance of regular practice of relaxation strategies to target / promote stress mgmt. Pt Behavioral Change Plan:  Pt set the following goals:  1. Go to work every day  2. Practice being mindful - paying attention to the present moment in a nonjudgmental way  3. Practice diaphragmatic breathing throughout the day  4. Go for walks (short or long) to manage stress. Also consider finding a gym you can use. 5. Complete ABC sheets to challenge negative thoughts    Pt scheduled F/U visit in 2 weeks.

## 2017-11-26 DIAGNOSIS — J30.9 ALLERGIC RHINITIS: ICD-10-CM

## 2017-11-26 RX ORDER — MONTELUKAST SODIUM 10 MG/1
10 TABLET ORAL DAILY
Qty: 90 TABLET | Refills: 1 | Status: SHIPPED | OUTPATIENT
Start: 2017-11-26 | End: 2017-11-27 | Stop reason: SDUPTHER

## 2017-11-27 ENCOUNTER — OFFICE VISIT (OUTPATIENT)
Dept: FAMILY MEDICINE CLINIC | Age: 37
End: 2017-11-27

## 2017-11-27 VITALS
DIASTOLIC BLOOD PRESSURE: 82 MMHG | SYSTOLIC BLOOD PRESSURE: 120 MMHG | TEMPERATURE: 99 F | WEIGHT: 275.8 LBS | BODY MASS INDEX: 44.32 KG/M2 | HEART RATE: 110 BPM | OXYGEN SATURATION: 95 % | HEIGHT: 66 IN

## 2017-11-27 DIAGNOSIS — I10 HYPERTENSION, UNSPECIFIED TYPE: ICD-10-CM

## 2017-11-27 DIAGNOSIS — R51.9 NONINTRACTABLE HEADACHE, UNSPECIFIED CHRONICITY PATTERN, UNSPECIFIED HEADACHE TYPE: ICD-10-CM

## 2017-11-27 DIAGNOSIS — F32.0 MILD SINGLE CURRENT EPISODE OF MAJOR DEPRESSIVE DISORDER (HCC): Primary | ICD-10-CM

## 2017-11-27 RX ORDER — MONTELUKAST SODIUM 10 MG/1
10 TABLET ORAL DAILY
Qty: 90 TABLET | Refills: 1 | Status: SHIPPED | OUTPATIENT
Start: 2017-11-27 | End: 2018-05-28

## 2017-11-30 ASSESSMENT — ENCOUNTER SYMPTOMS
VOMITING: 0
SORE THROAT: 0
EYE PAIN: 0
EYE WATERING: 0
SCALP TENDERNESS: 0
PHOTOPHOBIA: 0
SHORTNESS OF BREATH: 0
RHINORRHEA: 0
BACK PAIN: 0
ALLERGIC/IMMUNOLOGIC NEGATIVE: 1
RESPIRATORY NEGATIVE: 1
BLURRED VISION: 0
FACIAL SWEATING: 0
EYES NEGATIVE: 1
SINUS PRESSURE: 0
VISUAL CHANGE: 0
GASTROINTESTINAL NEGATIVE: 1
COUGH: 0
SWOLLEN GLANDS: 0
ABDOMINAL PAIN: 0
NAUSEA: 0
EYE REDNESS: 0
ORTHOPNEA: 0

## 2017-11-30 NOTE — PROGRESS NOTES
11/30/17    Don Hughes  1980      Chief Complaint   Patient presents with    Headache    Hypertension    Depression       Vitals:    11/27/17 1047   BP: 120/82   Pulse: 110   Temp: 99 °F (37.2 °C)   SpO2: 95%         Immunization History   Administered Date(s) Administered    Influenza Virus Vaccine 09/13/2011, 10/08/2012    Influenza, Tracie Fink, 3 Years and older, IM 09/21/2016, 09/27/2017    MMR 06/17/2015    Tdap (Boostrix, Adacel) 09/16/2014       Allergies   Allergen Reactions    Fexofenadine-Pseudoephed Er      The decongestant part    Penicillins     Sulfa Antibiotics      Outpatient Prescriptions Marked as Taking for the 11/27/17 encounter (Office Visit) with Luis Fernando Pena NP   Medication Sig Dispense Refill    montelukast (SINGULAIR) 10 MG tablet Take 1 tablet by mouth daily 90 tablet 1    albuterol sulfate HFA (PROAIR HFA) 108 (90 Base) MCG/ACT inhaler Inhale 2 puffs into the lungs every 6 hours as needed for Wheezing or Shortness of Breath 3 Inhaler 1    escitalopram (LEXAPRO) 10 MG tablet Take 1 tablet by mouth daily 30 tablet 3    metoprolol succinate (TOPROL XL) 50 MG extended release tablet Take 1 tablet by mouth daily 30 tablet 3    Fluticasone Propionate (FLONASE NA) by Nasal route      Cholecalciferol (VITAMIN D3) 2000 units CAPS Take 2,000 Units by mouth 2 times daily      butalbital-acetaminophen-caffeine (FIORICET, ESGIC) -40 MG per tablet Take 1 tablet by mouth every 4 hours as needed for Headaches 180 tablet 3    clotrimazole-betamethasone (LOTRISONE) 1-0.05 % cream Apply topically 2 times daily. 1 Tube 1    cetirizine (ZYRTEC) 10 MG tablet Take 10 mg by mouth daily.          Past Medical History:   Diagnosis Date    Allergic rhinitis     Asthma     Depression 5/19/2016    GERD (gastroesophageal reflux disease)     Hypertension 11/1/2017    Major depressive disorder, recurrent episode, moderate with anxious distress ([facility] [address] 11/7/2017    Pyloric stenosis Past Surgical History:   Procedure Laterality Date    WISDOM TOOTH EXTRACTION  early 19's    x 4     Family History   Problem Relation Age of Onset    Diabetes Mother     High Cholesterol Mother     Cancer Father      Social History     Social History    Marital status:      Spouse name: N/A    Number of children: N/A    Years of education: N/A     Occupational History    Not on file. Social History Main Topics    Smoking status: Never Smoker    Smokeless tobacco: Never Used    Alcohol use No      Comment: occ    Drug use: No    Sexual activity: Yes     Partners: Male     Other Topics Concern    Not on file     Social History Narrative    No narrative on file         Any recent diagnostic tests, hospital reports, office notes or consultation letters were reviewed prior to and during the visit. Headache    This is a recurrent problem. The current episode started more than 1 month ago. The problem has been gradually improving. Pertinent negatives include no abdominal pain, abnormal behavior, anorexia, back pain, blurred vision, coughing, dizziness, drainage, ear pain, eye pain, eye redness, eye watering, facial sweating, fever, hearing loss, insomnia, loss of balance, muscle aches, nausea, neck pain, numbness, phonophobia, photophobia, rhinorrhea, scalp tenderness, seizures, sinus pressure, sore throat, swollen glands, tingling, tinnitus, visual change, vomiting, weakness or weight loss. She has tried beta blockers (fioricet) for the symptoms. The treatment provided significant relief. Hypertension   This is a recurrent problem. The current episode started more than 1 month ago. The problem is controlled. Pertinent negatives include no anxiety, blurred vision, chest pain, headaches, malaise/fatigue, neck pain, orthopnea, palpitations, peripheral edema, PND, shortness of breath or sweats. Past treatments include beta blockers.  The current treatment provides significant has no cervical adenopathy. Neurological: She is alert and oriented to person, place, and time. Skin: Skin is warm and dry. Psychiatric: She has a normal mood and affect. Her behavior is normal. Judgment and thought content normal.         1. Mild single current episode of major depressive disorder ([address]  The condition is improving. The patient is tolerating the treatment and meds. Continue all these and check labs as appropriate      2. Nonintractable headache, unspecified chronicity The condition is improving. The patient is tolerating the treatment and meds. Continue all these and check labs as appropriate   pattern, unspecified headache type     3. Hypertension, unspecified type  Condition stable continue the medications, treatments, will check labs as appropriate       The patient received counseling on the following healthy behaviors: nutrition, exercise, medication adherence and decrease in alcohol consumption    Patient given educational materials on Nutrition, Exercise and Hypertension as appropriate. I have instructed the patient to complete a self tracking handout on Blood Pressures  and instructed them to bring it with them to the next appointment. Discussed use, benefit, and side effects of prescribed medications. Barriers to medication compliance addressed. All patient questions answered. Pt voiced understanding. Patient Self-Management Goal for Chronic Condition  Goal: I will follow the diet recommendations provided by my doctor: low fat, low cholesterol, substitute healthy fats, such as olive oil, canola oil, grapeseed oil for saturated fats like butter, margarine, and shortening and reduce sodium intake. I will take all medications as prescribed by my doctor, and I will call the office if I am having any medication problems.   Barriers to success: none  Plan for overcoming my barriers: N/A     Confidence: 9/10  Date goal set: 11/30/17  Date goal attained:       Alex Mehta Wendy Anderson, NP

## 2017-12-01 ENCOUNTER — OFFICE VISIT (OUTPATIENT)
Dept: PSYCHOLOGY | Age: 37
End: 2017-12-01

## 2017-12-01 DIAGNOSIS — F33.0 MAJOR DEPRESSIVE DISORDER, RECURRENT EPISODE, MILD (HCC): Primary | ICD-10-CM

## 2017-12-01 DIAGNOSIS — F41.1 GENERALIZED ANXIETY DISORDER: ICD-10-CM

## 2017-12-01 PROCEDURE — 90832 PSYTX W PT 30 MINUTES: CPT | Performed by: PSYCHOLOGIST

## 2017-12-01 ASSESSMENT — PATIENT HEALTH QUESTIONNAIRE - PHQ9
SUM OF ALL RESPONSES TO PHQ QUESTIONS 1-9: 8
2. FEELING DOWN, DEPRESSED OR HOPELESS: 1
SUM OF ALL RESPONSES TO PHQ9 QUESTIONS 1 & 2: 2
8. MOVING OR SPEAKING SO SLOWLY THAT OTHER PEOPLE COULD HAVE NOTICED. OR THE OPPOSITE, BEING SO FIGETY OR RESTLESS THAT YOU HAVE BEEN MOVING AROUND A LOT MORE THAN USUAL: 0
4. FEELING TIRED OR HAVING LITTLE ENERGY: 1
7. TROUBLE CONCENTRATING ON THINGS, SUCH AS READING THE NEWSPAPER OR WATCHING TELEVISION: 0
3. TROUBLE FALLING OR STAYING ASLEEP: 2
9. THOUGHTS THAT YOU WOULD BE BETTER OFF DEAD, OR OF HURTING YOURSELF: 0
6. FEELING BAD ABOUT YOURSELF - OR THAT YOU ARE A FAILURE OR HAVE LET YOURSELF OR YOUR FAMILY DOWN: 1
5. POOR APPETITE OR OVEREATING: 2
1. LITTLE INTEREST OR PLEASURE IN DOING THINGS: 1

## 2017-12-01 ASSESSMENT — ANXIETY QUESTIONNAIRES
5. BEING SO RESTLESS THAT IT IS HARD TO SIT STILL: 0-NOT AT ALL SURE
4. TROUBLE RELAXING: 1-SEVERAL DAYS
1. FEELING NERVOUS, ANXIOUS, OR ON EDGE: 1-SEVERAL DAYS
7. FEELING AFRAID AS IF SOMETHING AWFUL MIGHT HAPPEN: 0-NOT AT ALL SURE
6. BECOMING EASILY ANNOYED OR IRRITABLE: 1-SEVERAL DAYS
3. WORRYING TOO MUCH ABOUT DIFFERENT THINGS: 0-NOT AT ALL SURE
2. NOT BEING ABLE TO STOP OR CONTROL WORRYING: 1-SEVERAL DAYS
GAD7 TOTAL SCORE: 4

## 2017-12-01 NOTE — PATIENT INSTRUCTIONS
stomach. 4. Chest:  Take a very deep breath (through your upper chest, not your diaphragm) and hold it. 5. Shoulders:  Lift both shoulders up toward your ears. 6. Back of Neck:  Tuck in and lower your chin toward your chest.    7. Eyes:  Squint. 8. Forehead:  Raise your eyebrows. Sleep Hygiene Guidelines    Good dental hygiene is important in determining the health of your teeth and gums. We all know we are supposed to brush and floss regularly. Those who do so are more likely to have strong, healthy gums and less cavities. Similarly, good sleep hygiene is important in determining the quality and quantity of your sleep. Below are guidelines for good sleep hygiene practices. Review these guidelines and evaluate how well you practice good sleep hygiene. Caffeine:  Avoid Caffeine After 10AM  Caffeine disturbs sleep, even in people who do not think they experience a stimulation effect. Individuals with insomnia are often more sensitive to mild stimulants than are normal sleepers. Caffeine is found in items such as coffee, tea, soda, chocolate, and many over-the-counter medications (e.g., Excedrin). Its effects can last up to 12 hours. Thus, drinking caffeinated beverages should be avoided after 10AM, and should be limited to no more than two cups. You might consider a trial period of no caffeine if you tend to be sensitive to its effects. Nicotine:  Avoid Nicotine Before Bedtime  Although some smokers claim that smoking helps them relax, nicotine is a stimulant. The relaxing effects occur with the initial entry of the nicotine, but as the nicotine builds in the system it produces an effect similar to caffeine. Thus, smoking, dipping, or chewing tobacco should be avoided near bedtime and during the night. Dont smoke to get yourself back to sleep.     Alcohol:  Avoid Alcohol After Dinner  Alcohol often promotes the onset of sleep, but as alcohol is metabolized sleep becomes disturbed additional hour. Set the alarm clock and get out of bed at the same time each morning, weekdays and weekends, regardless of your bedtime or the amount of sleep you obtained on the previous night. You probably will be tempted to stay in bed in the morning if you did not sleep well, but try to maintain your new schedule. This guideline is designed to regulate your internal biological clock and reset your sleep-wake rhythm. Go to Bed Only When You Are Sleepy  There is no reason to go to bed if you are not sleepy. When you go to bed too early, it only gives you more time to become frustrated with your inability to sleep. Individuals often ponder the events of the day, plan the next day's schedule, or worry about their inability to fall asleep. You should therefore delay your bedtime until you are sleepy. This may mean that you go to bed even later than your scheduled bedtime. Remember to stick to your scheduled arising time regardless of the time you go to bed. Get Out of Bed When You Can't Fall Asleep or Go Back to Sleep in About 20-30 Minutes. Return to Bed Only When You Are Sleepy. Repeat This Step as Often as Necessary. Too much time in bed can decrease the sleep quality on subsequent nights and contribute to the maintenance of existing sleep problems. Dont lay in bed for extended times while awake. Although we don't want you to be a clock-watcher, get out of bed if you don't fall asleep fairly soon (i.e., 20-30 minutes). Use this time to engage in a quiet activity (e.g., reading by a soft light). Return to bed only when you are sleepy (e.g., yawning, head bobbing, eyes closing, concentration decreasing). Dont confuse tiredness with sleepiness; they are different. Tiredness doesnt lead to sleep, only sleepiness does. The object is for you to reconnect your bed with sleeping rather than frustration.  It will be demanding to follow this instruction, but many people have found ways to adhere to this guideline. Use the Bed or Bedroom for Sleep and Sex Only. Do NOT Watch TV, Listen to the Radio, Eat, or Read in Your Bedroom. The purpose of this guideline is to associate your bedroom with sleep rather than wakefulness. Just as you may associate the kitchen with hunger, this guideline will help you associate sleep with your bedroom. Follow this rule both during the day and at night. You may have to temporarily move the TV or radio from your bedroom to help you during treatment. If you have difficulty falling asleep without background noise, you can use neutral noise (e.g., white noise machine, fan) to help you fall asleep. If you must use music or the television, recognize that this noise may help you fall asleep but can actually disrupt your sleep later on. Set a timer so the noise will end after 45 minutes. Do Not Nap During the Day. Most sleep experts agree that daytime napping almost always disrupts sleep rhythm, resulting in lighter, more restless sleep, difficulty falling asleep, or early morning awakening. This guideline will help your body acquire a consistent sleep rhythm so that you feel drowsy and ready to sleep at about the same time each night. If you must nap, keep it brief, and take the nap about 8 hours after arising (no later than 4PM). It is best to set an alarm to ensure you dont sleep more than 30 minutes.

## 2017-12-12 ENCOUNTER — OFFICE VISIT (OUTPATIENT)
Dept: PSYCHOLOGY | Age: 37
End: 2017-12-12

## 2017-12-12 DIAGNOSIS — F33.0 MAJOR DEPRESSIVE DISORDER, RECURRENT EPISODE, MILD (HCC): ICD-10-CM

## 2017-12-12 DIAGNOSIS — F41.1 GENERALIZED ANXIETY DISORDER: Primary | ICD-10-CM

## 2017-12-12 PROCEDURE — 90832 PSYTX W PT 30 MINUTES: CPT | Performed by: PSYCHOLOGIST

## 2017-12-12 ASSESSMENT — PATIENT HEALTH QUESTIONNAIRE - PHQ9
SUM OF ALL RESPONSES TO PHQ QUESTIONS 1-9: 6
SUM OF ALL RESPONSES TO PHQ9 QUESTIONS 1 & 2: 2
7. TROUBLE CONCENTRATING ON THINGS, SUCH AS READING THE NEWSPAPER OR WATCHING TELEVISION: 1
6. FEELING BAD ABOUT YOURSELF - OR THAT YOU ARE A FAILURE OR HAVE LET YOURSELF OR YOUR FAMILY DOWN: 1
4. FEELING TIRED OR HAVING LITTLE ENERGY: 1
1. LITTLE INTEREST OR PLEASURE IN DOING THINGS: 1
9. THOUGHTS THAT YOU WOULD BE BETTER OFF DEAD, OR OF HURTING YOURSELF: 0
8. MOVING OR SPEAKING SO SLOWLY THAT OTHER PEOPLE COULD HAVE NOTICED. OR THE OPPOSITE, BEING SO FIGETY OR RESTLESS THAT YOU HAVE BEEN MOVING AROUND A LOT MORE THAN USUAL: 0
5. POOR APPETITE OR OVEREATING: 0
3. TROUBLE FALLING OR STAYING ASLEEP: 1
2. FEELING DOWN, DEPRESSED OR HOPELESS: 1

## 2017-12-12 ASSESSMENT — ANXIETY QUESTIONNAIRES
2. NOT BEING ABLE TO STOP OR CONTROL WORRYING: 1-SEVERAL DAYS
4. TROUBLE RELAXING: 2-OVER HALF THE DAYS
GAD7 TOTAL SCORE: 11
7. FEELING AFRAID AS IF SOMETHING AWFUL MIGHT HAPPEN: 1-SEVERAL DAYS
5. BEING SO RESTLESS THAT IT IS HARD TO SIT STILL: 2-OVER HALF THE DAYS
1. FEELING NERVOUS, ANXIOUS, OR ON EDGE: 1-SEVERAL DAYS
3. WORRYING TOO MUCH ABOUT DIFFERENT THINGS: 2-OVER HALF THE DAYS
6. BECOMING EASILY ANNOYED OR IRRITABLE: 2-OVER HALF THE DAYS

## 2017-12-12 NOTE — PATIENT INSTRUCTIONS
Goals: 1. Go to work every day  2. Practice being mindful - paying attention to the present moment in a nonjudgmental way  3. Practice diaphragmatic breathing throughout the day  4. Go for walks (short or long) to manage stress. Also consider finding a gym you can use. 5. Complete ABC sheets to challenge negative thoughts  6. If you cannot fall asleep after 15-20 minutes, get out of bed and engage in a relaxing activity (e.g., listen to soft music, read by a soft light) until you feel drowsy. 7. Practice progressive muscle relaxation for 5 minutes daily  8. Try visualization exercises (e.g., taking a trip in your mind) during which you focus on what your senses would experience in that place  9. Continue using the Calm radha      [facility]  Locations in [place], [facility], [place], [facility], [address] [place], [place] [address], [facility] E Mayra Skaggs free number: [phone]  [email or web address]    [facility]  Jasmine Whittington OdellDony cole ([facility] practice)  [phone]  [email or web address]      Connected with:   [facility]- PSYCHIATRY & ADDICTIVE MED  [address] [place] [address] Critical access hospital [address] [place] [address]  [email or web address]

## 2017-12-12 NOTE — PROGRESS NOTES
Behavioral Health Consultation  Gladys Garza Psy.D. Psychologist  12/12/2017  8:30 AM      Time spent with Patient: 30 minutes  This is patient's fourth [facility] appointment. Reason for Consult:  Depression, anxiety  Referring Provider: MARICHUY Baer 46  [address], [address]      S:  Pt Ozella Files) reported that she's been preparing to move out of her home with her  and into a new space by herself. Concerned about how her  will handle being on his own. Has been working on communicating her feelings and plans more effectively. Has been using Calm radha, finds it very helpful. Feels more supported by female friends lately. Reports mild increase in shoulder nerve pain since switching from Cymbalta to Lexapro, but pleased with improvement in her mood and anxiety. Feels stable on Lexapro 10mg at this time.       O:  MSE:  Appearance: good hygiene and appropriate attire  Attitude: cooperative and mild distress   Consciousness: alert  Orientation: oriented to person, place, time, general circumstance  Memory: recent and remote memory intact  Attention/Concentration: intact during session  Psychomotor Activity: normal  Eye Contact: normal  Speech: normal rate and volume, well-articulated, verbose  Mood: anxious  Affect: congruent with thought content and mood  Perception: within normal limits  Thought Content: within normal limits   Thought Process: logical, goal-directed, coherent, circumstantial  Insight: improving  Judgment: intact  Morbid ideation: no  Suicide Assessment: no suicidal ideation      History:    Medications:   Current Outpatient Prescriptions   Medication Sig Dispense Refill    montelukast (SINGULAIR) 10 MG tablet Take 1 tablet by mouth daily 90 tablet 1    albuterol sulfate HFA (PROAIR HFA) 108 (90 Base) MCG/ACT inhaler Inhale 2 puffs into the lungs every 6 hours as needed for Wheezing or Shortness of Breath 3 Inhaler 1    escitalopram (LEXAPRO) 10 MG tablet Take 1 tablet by mouth daily 30 tablet 3    metoprolol succinate (TOPROL XL) 50 MG extended release tablet Take 1 tablet by mouth daily 30 tablet 3    Fluticasone Propionate (FLONASE NA) by Nasal route      Cholecalciferol (VITAMIN D3) 2000 units CAPS Take 2,000 Units by mouth 2 times daily      butalbital-acetaminophen-caffeine (FIORICET, ESGIC) -40 MG per tablet Take 1 tablet by mouth every 4 hours as needed for Headaches 180 tablet 3    clotrimazole-betamethasone (LOTRISONE) 1-0.05 % cream Apply topically 2 times daily. 1 Tube 1    DULoxetine (CYMBALTA) 30 MG extended release capsule TAKE ONE CAPSULE BY MOUTH EVERY DAY 90 capsule 0    DULoxetine (CYMBALTA) 60 MG extended release capsule TAKE 1 CAPSULE BY MOUTH DAILY 90 capsule 0    cetirizine (ZYRTEC) 10 MG tablet Take 10 mg by mouth daily. No current facility-administered medications for this visit. Social History:   Social History     Social History    Marital status:      Spouse name: N/A    Number of children: N/A    Years of education: N/A     Occupational History    Not on file. Social History Main Topics    Smoking status: Never Smoker    Smokeless tobacco: Never Used    Alcohol use No      Comment: occ    Drug use: No    Sexual activity: Yes     Partners: Male     Other Topics Concern    Not on file     Social History Narrative    No narrative on file       TOBACCO:   reports that she has never smoked. She has never used smokeless tobacco.  ETOH:   reports that she does not drink alcohol. Family History:   Family History   Problem Relation Age of Onset    Diabetes Mother     High Cholesterol Mother     Cancer Father        A:  Pt's depressive symptoms continue to trend downward. Anxiety has been higher lateley due to psychosocial stressors related to  from her  and moving, but pt has been handling this well. Perceived social support has increased lately.  Reinforced pt's use of the Calm radha to meditate at night. Explored pt's ambivalence about the changes she is making and the future. Reinforced her progress with self-care and cognitive restructuring. Pt feels stable on Lexapro 10mg for now. No safety concerns at this time. Pt is aware of this writer's upcoming maternity leave. She accepted a referral for traditional psychotherapy in the community. GARY 7 SCORE 12/12/2017 12/1/2017 11/17/2017 11/7/2017   GARY-7 Total Score 11 4 11 17     Interpretation of GARY-7 score: 5-9 = mild anxiety, 10-14 = moderate anxiety, 15+ = severe anxiety. Recommend referral to behavioral health for scores 10 or greater. PHQ Scores 12/12/2017 12/1/2017 11/17/2017 11/7/2017   PHQ2 Score 2 2 3 5   PHQ9 Score 6 8 13 19     Interpretation of Total Score Depression Severity: 1-4 = Minimal depression, 5-9 = Mild depression, 10-14 = Moderate depression, 15-19 = Moderately severe depression, 20-27 = Severe depression    Diagnosis:    1. Generalized anxiety disorder    2.  Major depressive disorder, recurrent episode, mild (HCC)        Patient Active Problem List   Diagnosis    Allergic rhinitis    Hyperglycemia    Hyperlipidemia    Vitamin D deficiency    Neoplasm of uncertain behavior of skin    GERD (gastroesophageal reflux disease)    Cervical disc herniation    Depression    Allergic rhinitis due to pollen    Mild single current episode of major depressive disorder (HCC)    Nonintractable headache    Palpitations    Hypertension    Major depressive disorder, recurrent episode, moderate with anxious distress (HCC)    Generalized anxiety disorder         Plan:  Pt interventions:  Supportive techniques, Emphasized self-care as important for managing overall health, Identified maladaptive thoughts, Identified relevant behavioral strategies for targeting symptom reduction including going to work daily, walking/exercise, diaphragmatic breathing, ABC sheets, PMR, visualization, meditating with Calm radha, Lexapro 10mg, Emphasized importance of regular practice of relaxation strategies to target / promote stress mgmt and Reviewed options for identifying appropriate providers. Pt Behavioral Change Plan:  Pt set the following goals:  1. Go to work every day  2. Practice being mindful - paying attention to the present moment in a nonjudgmental way  3. Practice diaphragmatic breathing throughout the day  4. Go for walks (short or long) to manage stress. Also consider finding a gym you can use. 5. Complete ABC sheets to challenge negative thoughts  6. If you cannot fall asleep after 15-20 minutes, get out of bed and engage in a relaxing activity (e.g., listen to soft music, read by a soft light) until you feel drowsy. 7. Practice progressive muscle relaxation for 5 minutes daily  8. Try visualization exercises (e.g., taking a trip in your mind) during which you focus on what your senses would experience in that place  9. Continue using the Calm radha    Pt was referred for traditional psychotherapy in the community. No F/U  visits are scheduled at this time, although pt is aware that he may return in the future as needed.

## 2017-12-14 ENCOUNTER — TELEPHONE (OUTPATIENT)
Dept: FAMILY MEDICINE CLINIC | Age: 37
End: 2017-12-14

## 2017-12-14 NOTE — TELEPHONE ENCOUNTER
Pt calling regarding FMLA paperwork. States she is having issue the frequency. Form only indicated one day a week. Sometimes, her migraines last more than one day, so she has been taking more and employer has denied those additional days. States the form would need to be amended to indicate more days per week. Says the form on file in chart can be updated and resent. Pt requesting it be changed to 3-4 days per week.

## 2017-12-26 DIAGNOSIS — F32.A DEPRESSION: ICD-10-CM

## 2017-12-26 RX ORDER — DULOXETIN HYDROCHLORIDE 60 MG/1
CAPSULE, DELAYED RELEASE ORAL
Qty: 90 CAPSULE | Refills: 1 | Status: SHIPPED | OUTPATIENT
Start: 2017-12-26 | End: 2017-12-29 | Stop reason: ALTCHOICE

## 2017-12-26 RX ORDER — DULOXETIN HYDROCHLORIDE 30 MG/1
CAPSULE, DELAYED RELEASE ORAL
Qty: 90 CAPSULE | Refills: 1 | Status: SHIPPED | OUTPATIENT
Start: 2017-12-26 | End: 2017-12-29 | Stop reason: ALTCHOICE

## 2017-12-29 ENCOUNTER — OFFICE VISIT (OUTPATIENT)
Dept: FAMILY MEDICINE CLINIC | Age: 37
End: 2017-12-29

## 2017-12-29 VITALS
DIASTOLIC BLOOD PRESSURE: 86 MMHG | TEMPERATURE: 98.4 F | SYSTOLIC BLOOD PRESSURE: 124 MMHG | WEIGHT: 277.8 LBS | BODY MASS INDEX: 44.84 KG/M2 | OXYGEN SATURATION: 96 % | HEART RATE: 105 BPM

## 2017-12-29 DIAGNOSIS — I10 ESSENTIAL HYPERTENSION: ICD-10-CM

## 2017-12-29 DIAGNOSIS — F32.0 MILD SINGLE CURRENT EPISODE OF MAJOR DEPRESSIVE DISORDER (HCC): Primary | ICD-10-CM

## 2017-12-29 DIAGNOSIS — J30.9 ALLERGIC RHINITIS, UNSPECIFIED CHRONICITY, UNSPECIFIED SEASONALITY, UNSPECIFIED TRIGGER: ICD-10-CM

## 2018-01-04 ENCOUNTER — TELEPHONE (OUTPATIENT)
Dept: FAMILY MEDICINE CLINIC | Age: 38
End: 2018-01-04

## 2018-01-05 RX ORDER — METOPROLOL TARTRATE 50 MG/1
50 TABLET, FILM COATED ORAL 2 TIMES DAILY
Qty: 180 TABLET | Refills: 1 | OUTPATIENT
Start: 2018-01-05 | End: 2018-07-01 | Stop reason: SDUPTHER

## 2018-01-05 RX ORDER — ESCITALOPRAM OXALATE 20 MG/1
20 TABLET ORAL DAILY
Qty: 90 TABLET | Refills: 1 | OUTPATIENT
Start: 2018-01-05 | End: 2018-07-01 | Stop reason: SDUPTHER

## 2018-01-10 ASSESSMENT — ENCOUNTER SYMPTOMS
BLURRED VISION: 0
RESPIRATORY NEGATIVE: 1
EYES NEGATIVE: 1
ALLERGIC/IMMUNOLOGIC NEGATIVE: 1
SHORTNESS OF BREATH: 0
GASTROINTESTINAL NEGATIVE: 1
ORTHOPNEA: 0

## 2018-01-10 NOTE — PROGRESS NOTES
1/10/18    Deri Bend  1980      Chief Complaint   Patient presents with    Hypertension    Depression    Allergic Rhinitis        Vitals:    12/29/17 0952   BP: 124/86   Pulse: 105   Temp: 98.4 °F (36.9 °C)   SpO2: 96%         Immunization History   Administered Date(s) Administered    Influenza Virus Vaccine 09/13/2011, 10/08/2012    Influenza, Chao Chandler, 3 Years and older, IM 09/21/2016, 09/27/2017    MMR 06/17/2015    Tdap (Boostrix, Adacel) 09/16/2014       Allergies   Allergen Reactions    Fexofenadine-Pseudoephed Er      The decongestant part    Penicillins     Sulfa Antibiotics      Outpatient Prescriptions Marked as Taking for the 12/29/17 encounter (Office Visit) with Danielle Mcghee NP   Medication Sig Dispense Refill    montelukast (SINGULAIR) 10 MG tablet Take 1 tablet by mouth daily 90 tablet 1    albuterol sulfate HFA (PROAIR HFA) 108 (90 Base) MCG/ACT inhaler Inhale 2 puffs into the lungs every 6 hours as needed for Wheezing or Shortness of Breath 3 Inhaler 1    [DISCONTINUED] escitalopram (LEXAPRO) 10 MG tablet Take 1 tablet by mouth daily 30 tablet 3    metoprolol succinate (TOPROL XL) 50 MG extended release tablet Take 1 tablet by mouth daily 30 tablet 3    Fluticasone Propionate (FLONASE NA) by Nasal route      Cholecalciferol (VITAMIN D3) 2000 units CAPS Take 2,000 Units by mouth 2 times daily      butalbital-acetaminophen-caffeine (FIORICET, ESGIC) -40 MG per tablet Take 1 tablet by mouth every 4 hours as needed for Headaches 180 tablet 3    clotrimazole-betamethasone (LOTRISONE) 1-0.05 % cream Apply topically 2 times daily. 1 Tube 1    cetirizine (ZYRTEC) 10 MG tablet Take 10 mg by mouth daily.          Past Medical History:   Diagnosis Date    Allergic rhinitis     Asthma     Depression 5/19/2016    GERD (gastroesophageal reflux disease)     Hypertension 11/1/2017    Major depressive disorder, recurrent episode, moderate with anxious distress ([facility] [address] 11/7/2017    Pyloric stenosis      Past Surgical History:   Procedure Laterality Date    WISDOM TOOTH EXTRACTION  early 19's    x 4     Family History   Problem Relation Age of Onset    Diabetes Mother     High Cholesterol Mother     Cancer Father      Social History     Social History    Marital status:      Spouse name: N/A    Number of children: N/A    Years of education: N/A     Occupational History    Not on file. Social History Main Topics    Smoking status: Never Smoker    Smokeless tobacco: Never Used    Alcohol use No      Comment: occ    Drug use: No    Sexual activity: Yes     Partners: Male     Other Topics Concern    Not on file     Social History Narrative    No narrative on file         Any recent diagnostic tests, hospital reports, office notes or consultation letters were reviewed prior to and during the visit. Hypertension   This is a chronic problem. Pertinent negatives include no blurred vision, chest pain, headaches, malaise/fatigue, neck pain, orthopnea, palpitations, peripheral edema, PND, shortness of breath or sweats. Past treatments include beta blockers. Depression: Patient complains of depression. She complains of anhedonia and depressed mood. Onset was approximately a few months ago, gradually worsening since that time. She denies current suicidal and homicidal plan or intent. Family history significant for no psychiatric illness. Possible organic causes contributing are: none. Risk factors: negative life event  from  Previous treatment includes Lexapro and individual therapy. She complains of the following side effects from the treatment: none. Allergic Rhinitis  Patient presents for evaluation and treatment of allergic symptoms. Symptoms include: clear rhinorrhea, cough, nasal congestion and postnasal drip and are present in a seasonal pattern.  Precipitants include: environmental. Treatment currently includes intranasal steroids:

## 2018-01-26 ENCOUNTER — OFFICE VISIT (OUTPATIENT)
Dept: FAMILY MEDICINE CLINIC | Age: 38
End: 2018-01-26

## 2018-01-26 VITALS
WEIGHT: 278.2 LBS | BODY MASS INDEX: 44.71 KG/M2 | SYSTOLIC BLOOD PRESSURE: 116 MMHG | TEMPERATURE: 98.6 F | HEIGHT: 66 IN | DIASTOLIC BLOOD PRESSURE: 80 MMHG

## 2018-01-26 DIAGNOSIS — E78.2 MIXED HYPERLIPIDEMIA: ICD-10-CM

## 2018-01-26 DIAGNOSIS — R73.9 HYPERGLYCEMIA: ICD-10-CM

## 2018-01-26 DIAGNOSIS — F32.0 MILD SINGLE CURRENT EPISODE OF MAJOR DEPRESSIVE DISORDER (HCC): ICD-10-CM

## 2018-01-26 DIAGNOSIS — K21.9 GASTROESOPHAGEAL REFLUX DISEASE, ESOPHAGITIS PRESENCE NOT SPECIFIED: ICD-10-CM

## 2018-01-26 DIAGNOSIS — I10 ESSENTIAL HYPERTENSION: ICD-10-CM

## 2018-01-26 LAB
A/G RATIO: 1.4 (ref 1.1–2.2)
ALBUMIN SERPL-MCNC: 4.2 G/DL (ref 3.4–5)
ALP BLD-CCNC: 57 U/L (ref 40–129)
ALT SERPL-CCNC: 12 U/L (ref 10–40)
ANION GAP SERPL CALCULATED.3IONS-SCNC: 12 MMOL/L (ref 3–16)
AST SERPL-CCNC: 15 U/L (ref 15–37)
BILIRUB SERPL-MCNC: 0.4 MG/DL (ref 0–1)
BUN BLDV-MCNC: 12 MG/DL (ref 7–20)
CALCIUM SERPL-MCNC: 9.6 MG/DL (ref 8.3–10.6)
CHLORIDE BLD-SCNC: 102 MMOL/L (ref 99–110)
CHOLESTEROL, TOTAL: 180 MG/DL (ref 0–199)
CO2: 26 MMOL/L (ref 21–32)
CREAT SERPL-MCNC: 0.7 MG/DL (ref 0.6–1.1)
GFR AFRICAN AMERICAN: >60
GFR NON-AFRICAN AMERICAN: >60
GLOBULIN: 3 G/DL
GLUCOSE BLD-MCNC: 95 MG/DL (ref 70–99)
HDLC SERPL-MCNC: 42 MG/DL (ref 40–60)
LDL CHOLESTEROL CALCULATED: 112 MG/DL
POTASSIUM SERPL-SCNC: 4.6 MMOL/L (ref 3.5–5.1)
SODIUM BLD-SCNC: 140 MMOL/L (ref 136–145)
TOTAL PROTEIN: 7.2 G/DL (ref 6.4–8.2)
TRIGL SERPL-MCNC: 131 MG/DL (ref 0–150)
VLDLC SERPL CALC-MCNC: 26 MG/DL

## 2018-01-26 PROCEDURE — 99214 OFFICE O/P EST MOD 30 MIN: CPT | Performed by: FAMILY MEDICINE

## 2018-01-27 LAB
ESTIMATED AVERAGE GLUCOSE: 111.2 MG/DL
HBA1C MFR BLD: 5.5 %

## 2018-01-28 ASSESSMENT — ENCOUNTER SYMPTOMS
RESPIRATORY NEGATIVE: 1
GASTROINTESTINAL NEGATIVE: 1
EYES NEGATIVE: 1

## 2018-01-28 NOTE — PROGRESS NOTES
cervical adenopathy. Skin: She is not diaphoretic. Psychiatric: She has a normal mood and affect. Her behavior is normal. Judgment and thought content normal.         1. First degree ankle sprain, left, initial encounter  The condition is deteriorating, will change treatment, investigate cause and make further recommendations when data back. Need to add exercises     2. Hyperglycemia  Condition stable continue the medications, treatments, will check labs as appropriate    Hemoglobin A1C   3. Mixed hyperlipidemia  Condition stable continue the medications, treatments, will check labs as appropriate       The patient received counseling on the following healthy behaviors: nutrition, exercise, medication adherence and decrease in alcohol consumption    Patient given educational materials on Hyperlipidemia and Nutrition if appropriate    I have instructed the patient to complete a self tracking handout on diet and instructed them to bring it with them to the  next appointment. Discussed use, benefit, and side effects of prescribed medications. Barriers to medication compliance addressed. All patient questions answered. Pt voiced understanding. Lipid Panel    Comprehensive Metabolic Panel   4. Gastroesophageal reflux disease, esophagitis presence not specified Condition stable continue the medications, treatments, will check labs as appropriate       5. Essential hypertension   Condition stable continue the medications, treatments, will check labs as appropriate       The patient received counseling on the following healthy behaviors: nutrition, exercise, medication adherence and decrease in alcohol consumption    Patient given educational materials on Nutrition, Exercise and Hypertension as appropriate. I have instructed the patient to complete a self tracking handout on Blood Pressures  and instructed them to bring it with them to the next appointment.      Discussed use, benefit, and side effects of prescribed medications. Barriers to medication compliance addressed. All patient questions answered. Pt voiced understanding. Lipid Panel    Comprehensive Metabolic Panel   6.  Mild single current episode of major depressive disorder ([facility] [address]  Condition stable continue the medications, treatments, will check labs as appropriate                 Consuelo Hunt MD

## 2018-02-14 ENCOUNTER — OFFICE VISIT (OUTPATIENT)
Dept: FAMILY MEDICINE CLINIC | Age: 38
End: 2018-02-14

## 2018-02-14 VITALS
HEIGHT: 66 IN | SYSTOLIC BLOOD PRESSURE: 120 MMHG | BODY MASS INDEX: 45.16 KG/M2 | TEMPERATURE: 98.9 F | WEIGHT: 281 LBS | HEART RATE: 90 BPM | OXYGEN SATURATION: 97 % | DIASTOLIC BLOOD PRESSURE: 74 MMHG

## 2018-02-14 DIAGNOSIS — M54.50 ACUTE MIDLINE LOW BACK PAIN WITHOUT SCIATICA: ICD-10-CM

## 2018-02-14 DIAGNOSIS — M26.69 TMJ INFLAMMATION: Primary | ICD-10-CM

## 2018-02-14 DIAGNOSIS — B00.1 HERPES LABIALIS: ICD-10-CM

## 2018-02-14 RX ORDER — VALACYCLOVIR HYDROCHLORIDE 1 G/1
2000 TABLET, FILM COATED ORAL 2 TIMES DAILY
Qty: 4 TABLET | Refills: 2 | Status: SHIPPED | OUTPATIENT
Start: 2018-02-14 | End: 2022-02-17

## 2018-02-14 RX ORDER — METHYLPREDNISOLONE 4 MG/1
TABLET ORAL
Qty: 1 KIT | Refills: 0 | Status: SHIPPED | OUTPATIENT
Start: 2018-02-14 | End: 2018-02-20

## 2018-02-14 RX ORDER — CYCLOBENZAPRINE HCL 10 MG
10 TABLET ORAL 3 TIMES DAILY PRN
Qty: 30 TABLET | Refills: 0 | Status: SHIPPED | OUTPATIENT
Start: 2018-02-14 | End: 2018-02-24

## 2018-02-14 ASSESSMENT — ENCOUNTER SYMPTOMS
SORE THROAT: 0
EYES NEGATIVE: 1
GASTROINTESTINAL NEGATIVE: 1
BOWEL INCONTINENCE: 0
COUGH: 0
ALLERGIC/IMMUNOLOGIC NEGATIVE: 1
ABDOMINAL PAIN: 0
RHINORRHEA: 0
RESPIRATORY NEGATIVE: 1
VOMITING: 0
BACK PAIN: 1
DIARRHEA: 0

## 2018-02-14 NOTE — PROGRESS NOTES
clotrimazole-betamethasone (LOTRISONE) 1-0.05 % cream Apply topically 2 times daily. 1 Tube 1    cetirizine (ZYRTEC) 10 MG tablet Take 10 mg by mouth daily. Past Medical History:   Diagnosis Date    Allergic rhinitis     Asthma     Depression 5/19/2016    GERD (gastroesophageal reflux disease)     Hypertension 11/1/2017    Major depressive disorder, recurrent episode, moderate with anxious distress ([facility] [address] 11/7/2017    Pyloric stenosis      Past Surgical History:   Procedure Laterality Date    WISDOM TOOTH EXTRACTION  early 19's    x 4     Family History   Problem Relation Age of Onset    Diabetes Mother     High Cholesterol Mother     Cancer Father      Social History     Social History    Marital status:      Spouse name: N/A    Number of children: N/A    Years of education: N/A     Occupational History    Not on file. Social History Main Topics    Smoking status: Never Smoker    Smokeless tobacco: Never Used    Alcohol use No      Comment: occ    Drug use: No    Sexual activity: Yes     Partners: Male     Other Topics Concern    Not on file     Social History Narrative    No narrative on file         Any recent diagnostic tests, hospital reports, office notes or consultation letters were reviewed prior to and during the visit. Otalgia    There is pain in the left ear. The current episode started in the past 7 days. The problem has been unchanged. There has been no fever. The pain is moderate. Associated symptoms include headaches. Pertinent negatives include no abdominal pain, coughing, diarrhea, ear discharge, hearing loss, rash, rhinorrhea, sore throat or vomiting. She has tried acetaminophen for the symptoms. The treatment provided no relief. Back Pain   This is a new problem. The current episode started in the past 7 days. The problem occurs constantly. The problem has been waxing and waning since onset. The pain is present in the lumbar spine.  The quality of the pain is described as aching. The pain does not radiate. The pain is at a severity of 5/10. The symptoms are aggravated by bending, sitting, standing, position and twisting. Associated symptoms include headaches. Pertinent negatives include no abdominal pain, bladder incontinence, bowel incontinence, chest pain, dysuria, fever, leg pain, numbness, paresis, paresthesias, pelvic pain, perianal numbness, tingling, weakness or weight loss. Herpes Labialis   The patient has a several day history of a painful rash on the lower lip. PMH: generally healthy. Has had herpes zoster in the past.          Review of Systems   Constitutional: Negative. Negative for fever and weight loss. HENT: Positive for ear pain and mouth sores. Negative for ear discharge, hearing loss, rhinorrhea and sore throat. Eyes: Negative. Respiratory: Negative. Negative for cough. Cardiovascular: Negative. Negative for chest pain. Gastrointestinal: Negative. Negative for abdominal pain, bowel incontinence, diarrhea and vomiting. Endocrine: Negative. Genitourinary: Negative. Negative for bladder incontinence, dysuria and pelvic pain. Musculoskeletal: Positive for back pain. Skin: Negative. Negative for rash. Allergic/Immunologic: Negative. Neurological: Positive for headaches. Negative for tingling, weakness, numbness and paresthesias. Hematological: Negative. Psychiatric/Behavioral: Negative. Physical Exam   Constitutional: She is oriented to person, place, and time. She appears well-developed and well-nourished. HENT:   Head: Normocephalic. Right Ear: External ear normal.   Left Ear: External ear normal.   Mouth/Throat: Oropharynx is clear and moist. No oropharyngeal exudate. +TMJ tenderness and click     Eyes: Conjunctivae are normal. Pupils are equal, round, and reactive to light. Neck: Normal range of motion. Neck supple.    Cardiovascular: Normal rate, regular rhythm, normal heart sounds

## 2018-05-12 DIAGNOSIS — J45.20 REACTIVE AIRWAY DISEASE, MILD INTERMITTENT, UNCOMPLICATED: ICD-10-CM

## 2018-05-12 RX ORDER — ALBUTEROL SULFATE 90 UG/1
2 AEROSOL, METERED RESPIRATORY (INHALATION) EVERY 6 HOURS PRN
Qty: 3 INHALER | Refills: 1 | Status: SHIPPED | OUTPATIENT
Start: 2018-05-12 | End: 2021-05-17 | Stop reason: SDUPTHER

## 2018-05-18 ENCOUNTER — OFFICE VISIT (OUTPATIENT)
Dept: FAMILY MEDICINE CLINIC | Age: 38
End: 2018-05-18

## 2018-05-18 VITALS
BODY MASS INDEX: 45.87 KG/M2 | SYSTOLIC BLOOD PRESSURE: 138 MMHG | DIASTOLIC BLOOD PRESSURE: 88 MMHG | HEART RATE: 85 BPM | RESPIRATION RATE: 22 BRPM | OXYGEN SATURATION: 97 % | TEMPERATURE: 98.8 F | HEIGHT: 66 IN | WEIGHT: 285.4 LBS

## 2018-05-18 DIAGNOSIS — H92.01 RIGHT EAR PAIN: ICD-10-CM

## 2018-05-18 DIAGNOSIS — F32.0 MILD SINGLE CURRENT EPISODE OF MAJOR DEPRESSIVE DISORDER (HCC): ICD-10-CM

## 2018-05-18 DIAGNOSIS — I10 ESSENTIAL HYPERTENSION: ICD-10-CM

## 2018-05-18 DIAGNOSIS — J01.10 ACUTE FRONTAL SINUSITIS, RECURRENCE NOT SPECIFIED: Primary | ICD-10-CM

## 2018-05-18 RX ORDER — AZITHROMYCIN 250 MG/1
TABLET, FILM COATED ORAL
Qty: 1 PACKET | Refills: 0 | Status: SHIPPED | OUTPATIENT
Start: 2018-05-18 | End: 2018-05-28

## 2018-05-18 ASSESSMENT — ENCOUNTER SYMPTOMS
SINUS PRESSURE: 1
ORTHOPNEA: 0
EYES NEGATIVE: 1
RESPIRATORY NEGATIVE: 1
SINUS PAIN: 1
ALLERGIC/IMMUNOLOGIC NEGATIVE: 1
HOARSE VOICE: 0
SHORTNESS OF BREATH: 0
SORE THROAT: 0
SWOLLEN GLANDS: 0
BLURRED VISION: 0
GASTROINTESTINAL NEGATIVE: 1
COUGH: 0
SINUS COMPLAINT: 1

## 2018-05-28 DIAGNOSIS — J30.9 ALLERGIC RHINITIS: ICD-10-CM

## 2018-05-28 RX ORDER — MONTELUKAST SODIUM 10 MG/1
10 TABLET ORAL DAILY
Qty: 90 TABLET | Refills: 1 | Status: SHIPPED | OUTPATIENT
Start: 2018-05-28 | End: 2021-05-17

## 2018-06-19 ENCOUNTER — OFFICE VISIT (OUTPATIENT)
Dept: FAMILY MEDICINE CLINIC | Age: 38
End: 2018-06-19

## 2018-06-19 VITALS
DIASTOLIC BLOOD PRESSURE: 78 MMHG | OXYGEN SATURATION: 97 % | BODY MASS INDEX: 45.13 KG/M2 | SYSTOLIC BLOOD PRESSURE: 126 MMHG | TEMPERATURE: 98.7 F | WEIGHT: 280.8 LBS | HEIGHT: 66 IN | HEART RATE: 96 BPM

## 2018-06-19 DIAGNOSIS — R19.7 NAUSEA VOMITING AND DIARRHEA: Primary | ICD-10-CM

## 2018-06-19 DIAGNOSIS — R10.13 EPIGASTRIC PAIN: ICD-10-CM

## 2018-06-19 DIAGNOSIS — R11.2 NAUSEA VOMITING AND DIARRHEA: Primary | ICD-10-CM

## 2018-06-19 LAB
BILIRUBIN, POC: NORMAL
BLOOD URINE, POC: NORMAL
CLARITY, POC: NORMAL
COLOR, POC: YELLOW
GLUCOSE URINE, POC: NORMAL
KETONES, POC: NORMAL
LEUKOCYTE EST, POC: NORMAL
NITRITE, POC: NORMAL
PH, POC: 6.5
PROTEIN, POC: NORMAL
SPECIFIC GRAVITY, POC: 1.01
UROBILINOGEN, POC: 0.2

## 2018-06-19 RX ORDER — ONDANSETRON 4 MG/1
4 TABLET, FILM COATED ORAL EVERY 8 HOURS PRN
Qty: 30 TABLET | Refills: 0 | Status: SHIPPED | OUTPATIENT
Start: 2018-06-19 | End: 2019-06-19

## 2018-06-19 RX ORDER — OMEPRAZOLE 40 MG/1
40 CAPSULE, DELAYED RELEASE ORAL DAILY
Qty: 30 CAPSULE | Refills: 3 | Status: SHIPPED | OUTPATIENT
Start: 2018-06-19

## 2018-06-19 RX ORDER — SUCRALFATE 1 G/1
1 TABLET ORAL 4 TIMES DAILY
Qty: 120 TABLET | Refills: 3 | Status: SHIPPED | OUTPATIENT
Start: 2018-06-19 | End: 2021-05-17

## 2018-06-20 ASSESSMENT — ENCOUNTER SYMPTOMS
RESPIRATORY NEGATIVE: 1
CHANGE IN BOWEL HABIT: 0
VOMITING: 1
SORE THROAT: 0
ABDOMINAL PAIN: 1
VISUAL CHANGE: 0
EYES NEGATIVE: 1
COUGH: 0
SWOLLEN GLANDS: 0
ALLERGIC/IMMUNOLOGIC NEGATIVE: 1
NAUSEA: 1

## 2018-07-01 DIAGNOSIS — F33.1 MAJOR DEPRESSIVE DISORDER, RECURRENT EPISODE, MODERATE WITH ANXIOUS DISTRESS (HCC): Primary | ICD-10-CM

## 2018-07-01 DIAGNOSIS — I10 ESSENTIAL HYPERTENSION: ICD-10-CM

## 2018-07-01 RX ORDER — METOPROLOL TARTRATE 50 MG/1
50 TABLET, FILM COATED ORAL 2 TIMES DAILY
Qty: 180 TABLET | Refills: 0 | Status: SHIPPED | OUTPATIENT
Start: 2018-07-01 | End: 2021-05-17 | Stop reason: SDUPTHER

## 2018-07-01 RX ORDER — ESCITALOPRAM OXALATE 20 MG/1
20 TABLET ORAL DAILY
Qty: 90 TABLET | Refills: 0 | Status: SHIPPED | OUTPATIENT
Start: 2018-07-01 | End: 2021-05-17

## 2018-07-01 NOTE — TELEPHONE ENCOUNTER
Have you picked out a new primary care physician? Because  after July 27th Luan Senters will not be able to see you at the office. If not, please call [phone] to help locate a [facility] doctor near to your home or office.

## 2018-07-05 DIAGNOSIS — F33.1 MAJOR DEPRESSIVE DISORDER, RECURRENT EPISODE, MODERATE WITH ANXIOUS DISTRESS (HCC): ICD-10-CM

## 2018-07-05 DIAGNOSIS — I10 ESSENTIAL HYPERTENSION: ICD-10-CM

## 2018-07-05 RX ORDER — METOPROLOL TARTRATE 50 MG/1
TABLET, FILM COATED ORAL
Qty: 180 TABLET | Refills: 0 | OUTPATIENT
Start: 2018-07-05

## 2018-07-05 RX ORDER — ESCITALOPRAM OXALATE 20 MG/1
20 TABLET ORAL DAILY
Qty: 90 TABLET | Refills: 0 | OUTPATIENT
Start: 2018-07-05

## 2018-08-28 ENCOUNTER — TELEPHONE (OUTPATIENT)
Dept: FAMILY MEDICINE CLINIC | Age: 38
End: 2018-08-28

## 2018-08-28 NOTE — TELEPHONE ENCOUNTER
Sonya Covarrubias used to see Dr. Anju Saldaña at Dr. Bill [facility]er office and would like to schedule at [address]. Ok to schedule? Please advise. Thanks.       Sonya Covarrubias [phone] (home)

## 2018-09-20 ENCOUNTER — OFFICE VISIT (OUTPATIENT)
Dept: PSYCHOLOGY | Age: 38
End: 2018-09-20

## 2018-09-20 DIAGNOSIS — F41.1 GENERALIZED ANXIETY DISORDER: ICD-10-CM

## 2018-09-20 DIAGNOSIS — F33.1 MAJOR DEPRESSIVE DISORDER, RECURRENT EPISODE, MODERATE (HCC): Primary | ICD-10-CM

## 2018-09-20 PROCEDURE — 90832 PSYTX W PT 30 MINUTES: CPT | Performed by: PSYCHOLOGIST

## 2018-09-20 ASSESSMENT — ANXIETY QUESTIONNAIRES
GAD7 TOTAL SCORE: 5
6. BECOMING EASILY ANNOYED OR IRRITABLE: 1-SEVERAL DAYS
7. FEELING AFRAID AS IF SOMETHING AWFUL MIGHT HAPPEN: 1-SEVERAL DAYS
1. FEELING NERVOUS, ANXIOUS, OR ON EDGE: 1-SEVERAL DAYS
5. BEING SO RESTLESS THAT IT IS HARD TO SIT STILL: 0-NOT AT ALL SURE
3. WORRYING TOO MUCH ABOUT DIFFERENT THINGS: 1-SEVERAL DAYS
2. NOT BEING ABLE TO STOP OR CONTROL WORRYING: 1-SEVERAL DAYS
4. TROUBLE RELAXING: 0-NOT AT ALL SURE

## 2018-09-20 ASSESSMENT — PATIENT HEALTH QUESTIONNAIRE - PHQ9
7. TROUBLE CONCENTRATING ON THINGS, SUCH AS READING THE NEWSPAPER OR WATCHING TELEVISION: 0
SUM OF ALL RESPONSES TO PHQ9 QUESTIONS 1 & 2: 2
10. IF YOU CHECKED OFF ANY PROBLEMS, HOW DIFFICULT HAVE THESE PROBLEMS MADE IT FOR YOU TO DO YOUR WORK, TAKE CARE OF THINGS AT HOME, OR GET ALONG WITH OTHER PEOPLE: 1
9. THOUGHTS THAT YOU WOULD BE BETTER OFF DEAD, OR OF HURTING YOURSELF: 0
SUM OF ALL RESPONSES TO PHQ QUESTIONS 1-9: 5
SUM OF ALL RESPONSES TO PHQ QUESTIONS 1-9: 5
3. TROUBLE FALLING OR STAYING ASLEEP: 2
8. MOVING OR SPEAKING SO SLOWLY THAT OTHER PEOPLE COULD HAVE NOTICED. OR THE OPPOSITE, BEING SO FIGETY OR RESTLESS THAT YOU HAVE BEEN MOVING AROUND A LOT MORE THAN USUAL: 0
1. LITTLE INTEREST OR PLEASURE IN DOING THINGS: 1
2. FEELING DOWN, DEPRESSED OR HOPELESS: 1
6. FEELING BAD ABOUT YOURSELF - OR THAT YOU ARE A FAILURE OR HAVE LET YOURSELF OR YOUR FAMILY DOWN: 0
4. FEELING TIRED OR HAVING LITTLE ENERGY: 1
5. POOR APPETITE OR OVEREATING: 0

## 2018-09-20 NOTE — PROGRESS NOTES
recent and remote memory intact  Attention/Concentration: intact during session  Psychomotor Activity: normal  Eye Contact: normal  Speech: normal rate and volume, well-articulated, verbose  Mood: dyshporic and anxious  Affect: congruent with thought content and mood  Perception: within normal limits  Thought Content: within normal limits   Thought Process: logical, goal-directed, coherent, circumstantial  Insight: improving  Judgment: intact  Morbid ideation: no  Suicide Assessment: no suicidal ideation      History:    Medications:   Current Outpatient Prescriptions   Medication Sig Dispense Refill    escitalopram (LEXAPRO) 20 MG tablet Take 1 tablet by mouth daily 90 tablet 0    metoprolol tartrate (LOPRESSOR) 50 MG tablet Take 1 tablet by mouth 2 times daily 180 tablet 0    omeprazole (PRILOSEC) 40 MG delayed release capsule Take 1 capsule by mouth daily 30 capsule 3    ondansetron (ZOFRAN) 4 MG tablet Take 1 tablet by mouth every 8 hours as needed for Nausea or Vomiting 30 tablet 0    sucralfate (CARAFATE) 1 GM tablet Take 1 tablet by mouth 4 times daily 120 tablet 3    montelukast (SINGULAIR) 10 MG tablet Take 1 tablet by mouth daily 90 tablet 1    albuterol sulfate HFA (PROAIR HFA) 108 (90 Base) MCG/ACT inhaler Inhale 2 puffs into the lungs every 6 hours as needed for Wheezing or Shortness of Breath 3 Inhaler 1    valACYclovir (VALTREX) 1 g tablet Take 2 tablets by mouth 2 times daily 4 tablet 2    Fluticasone Propionate (FLONASE NA) by Nasal route      Cholecalciferol (VITAMIN D3) 2000 units CAPS Take 2,000 Units by mouth 2 times daily      butalbital-acetaminophen-caffeine (FIORICET, ESGIC) -40 MG per tablet Take 1 tablet by mouth every 4 hours as needed for Headaches 180 tablet 3    clotrimazole-betamethasone (LOTRISONE) 1-0.05 % cream Apply topically 2 times daily. 1 Tube 1    cetirizine (ZYRTEC) 10 MG tablet Take 10 mg by mouth daily.        No current facility-administered medications for this visit. Social History:   Social History     Social History    Marital status:      Spouse name: N/A    Number of children: N/A    Years of education: N/A     Occupational History    Not on file. Social History Main Topics    Smoking status: Never Smoker    Smokeless tobacco: Never Used    Alcohol use No      Comment: occ    Drug use: No    Sexual activity: Yes     Partners: Male     Other Topics Concern    Not on file     Social History Narrative    No narrative on file       TOBACCO:   reports that she has never smoked. She has never used smokeless tobacco.  ETOH:   reports that she does not drink alcohol. Family History:   Family History   Problem Relation Age of Onset    Diabetes Mother     High Cholesterol Mother     Cancer Father        A:  Pt was last seen in December 2017. She described in detail her [facility], medication changes, and SI that led to a helpful inpatient psychiatric hospitalization within the last month. Pt denied SI/HI, plan or intent since that time. No imminent risk of self-harm at this time. Safe for outpatient level of care. She has established with a new PCP and psychiatrist in the community. She is currently off work but will be returning soon. Limited self-care behaviors lately. Trying to make healthy choices in her relationships. Validated pt's distress about her recent experiences. Reinforced her effort to get the help she needs. Reviewed her goal list with adaptive coping strategies she has benefited from using in the past. Encouraged her to resume these activities, which she agreed to do. GARY 7 SCORE 9/20/2018 12/12/2017 12/1/2017 11/17/2017 11/7/2017   GARY-7 Total Score 5 11 4 11 17     Interpretation of GARY-7 score: 5-9 = mild anxiety, 10-14 = moderate anxiety, 15+ = severe anxiety. Recommend referral to behavioral health for scores 10 or greater.     PHQ Scores 9/20/2018 12/12/2017 12/1/2017 11/17/2017 11/7/2017   PHQ2 Score

## 2018-09-20 NOTE — PATIENT INSTRUCTIONS
Goals: 1. Practice being mindful - paying attention to the present moment in a nonjudgmental way  2. Practice diaphragmatic breathing throughout the day  3. Go for walks (short or long) to manage stress  4. Complete ABC sheets to challenge negative thoughts  5. Practice progressive muscle relaxation daily  6. Try visualization exercises (e.g., taking a trip in your mind) during which you focus on what your senses would experience in that place  7. Continue using the Calm radha  8.  Spend time around supportive people      Past antidepressants that you have taken:  Cymbalta  Lexapro

## 2018-10-30 ENCOUNTER — OFFICE VISIT (OUTPATIENT)
Dept: PSYCHOLOGY | Age: 38
End: 2018-10-30
Payer: COMMERCIAL

## 2018-10-30 DIAGNOSIS — F33.41 MAJOR DEPRESSIVE DISORDER, RECURRENT EPISODE, IN PARTIAL REMISSION WITH ANXIOUS DISTRESS (HCC): Primary | ICD-10-CM

## 2018-10-30 PROCEDURE — 90832 PSYTX W PT 30 MINUTES: CPT | Performed by: PSYCHOLOGIST

## 2018-10-30 ASSESSMENT — ANXIETY QUESTIONNAIRES
5. BEING SO RESTLESS THAT IT IS HARD TO SIT STILL: 1-SEVERAL DAYS
1. FEELING NERVOUS, ANXIOUS, OR ON EDGE: 0-NOT AT ALL SURE
6. BECOMING EASILY ANNOYED OR IRRITABLE: 0-NOT AT ALL SURE
7. FEELING AFRAID AS IF SOMETHING AWFUL MIGHT HAPPEN: 0-NOT AT ALL SURE
GAD7 TOTAL SCORE: 2
2. NOT BEING ABLE TO STOP OR CONTROL WORRYING: 0-NOT AT ALL SURE
3. WORRYING TOO MUCH ABOUT DIFFERENT THINGS: 1-SEVERAL DAYS
4. TROUBLE RELAXING: 0-NOT AT ALL SURE

## 2018-10-30 ASSESSMENT — PATIENT HEALTH QUESTIONNAIRE - PHQ9
8. MOVING OR SPEAKING SO SLOWLY THAT OTHER PEOPLE COULD HAVE NOTICED. OR THE OPPOSITE, BEING SO FIGETY OR RESTLESS THAT YOU HAVE BEEN MOVING AROUND A LOT MORE THAN USUAL: 0
SUM OF ALL RESPONSES TO PHQ QUESTIONS 1-9: 3
1. LITTLE INTEREST OR PLEASURE IN DOING THINGS: 0
5. POOR APPETITE OR OVEREATING: 1
10. IF YOU CHECKED OFF ANY PROBLEMS, HOW DIFFICULT HAVE THESE PROBLEMS MADE IT FOR YOU TO DO YOUR WORK, TAKE CARE OF THINGS AT HOME, OR GET ALONG WITH OTHER PEOPLE: 1
7. TROUBLE CONCENTRATING ON THINGS, SUCH AS READING THE NEWSPAPER OR WATCHING TELEVISION: 1
SUM OF ALL RESPONSES TO PHQ QUESTIONS 1-9: 3
SUM OF ALL RESPONSES TO PHQ9 QUESTIONS 1 & 2: 0
4. FEELING TIRED OR HAVING LITTLE ENERGY: 1
6. FEELING BAD ABOUT YOURSELF - OR THAT YOU ARE A FAILURE OR HAVE LET YOURSELF OR YOUR FAMILY DOWN: 0
2. FEELING DOWN, DEPRESSED OR HOPELESS: 0
3. TROUBLE FALLING OR STAYING ASLEEP: 0
9. THOUGHTS THAT YOU WOULD BE BETTER OFF DEAD, OR OF HURTING YOURSELF: 0

## 2018-10-30 NOTE — PROGRESS NOTES
Behavioral Health Consultation  Charles Meier Psy.D. Psychologist  10/30/2018  2:00 PM      Time spent with Patient: 30 minutes  This is patient's sixth [facility] appointment. Reason for Consult:  Depression, anxiety  Referring Provider: Marlys Chen NP  [id number] [address] [facility]  [place], [address]      S:  Pt Harinder Ruiz) reported that she's been doing well. Feels better, stronger. Rescheduled last [facility] appt because she couldn't afford to keep it. Back to work, switched positions to a  job that she's handling much better. Believes changing jobs has made a huge difference for her. Enjoys the customers, much happier now. Spends time coloring, using positive self-talk. Pleased with her living environment. Relationship with her ex has improved; he's been less needy lately. Psychiatrist Dr. Cecilia Stevens has her on Zoloft 100mg. Plus Trazodone 50mg has helped with sleep. New PCP is Dr. Katie Vásquez through [place] ([phone]).       O:  MSE:  Appearance: good hygiene and appropriate attire  Attitude: cooperative, friendly and no distress   Consciousness: alert  Orientation: oriented to person, place, time, general circumstance  Memory: recent and remote memory intact  Attention/Concentration: intact during session  Psychomotor Activity: normal  Eye Contact: normal  Speech: normal rate and volume, well-articulated, verbose  Mood: euthymic  Affect: congruent with thought content and mood  Perception: within normal limits  Thought Content: within normal limits   Thought Process: logical, goal-directed, coherent   Insight: improving  Judgment: intact  Morbid ideation: no  Suicide Assessment: no suicidal ideation      History:    Medications:   Current Outpatient Prescriptions   Medication Sig Dispense Refill    escitalopram (LEXAPRO) 20 MG tablet Take 1 tablet by mouth daily 90 tablet 0    metoprolol tartrate (LOPRESSOR) 50 MG tablet Take 1 tablet by mouth 2 times daily 180 tablet 0    omeprazole (PRILOSEC) 40 MG delayed release capsule Take 1 capsule by mouth daily 30 capsule 3    ondansetron (ZOFRAN) 4 MG tablet Take 1 tablet by mouth every 8 hours as needed for Nausea or Vomiting 30 tablet 0    sucralfate (CARAFATE) 1 GM tablet Take 1 tablet by mouth 4 times daily 120 tablet 3    montelukast (SINGULAIR) 10 MG tablet Take 1 tablet by mouth daily 90 tablet 1    albuterol sulfate HFA (PROAIR HFA) 108 (90 Base) MCG/ACT inhaler Inhale 2 puffs into the lungs every 6 hours as needed for Wheezing or Shortness of Breath 3 Inhaler 1    valACYclovir (VALTREX) 1 g tablet Take 2 tablets by mouth 2 times daily 4 tablet 2    Fluticasone Propionate (FLONASE NA) by Nasal route      Cholecalciferol (VITAMIN D3) 2000 units CAPS Take 2,000 Units by mouth 2 times daily      butalbital-acetaminophen-caffeine (FIORICET, ESGIC) -40 MG per tablet Take 1 tablet by mouth every 4 hours as needed for Headaches 180 tablet 3    clotrimazole-betamethasone (LOTRISONE) 1-0.05 % cream Apply topically 2 times daily. 1 Tube 1    cetirizine (ZYRTEC) 10 MG tablet Take 10 mg by mouth daily. No current facility-administered medications for this visit. Social History:   Social History     Social History    Marital status:      Spouse name: N/A    Number of children: N/A    Years of education: N/A     Occupational History    Not on file. Social History Main Topics    Smoking status: Never Smoker    Smokeless tobacco: Never Used    Alcohol use No      Comment: occ    Drug use: No    Sexual activity: Yes     Partners: Male     Other Topics Concern    Not on file     Social History Narrative    No narrative on file       TOBACCO:   reports that she has never smoked. She has never used smokeless tobacco.  ETOH:   reports that she does not drink alcohol.     Family History:   Family History   Problem Relation Age of Onset    Diabetes Mother     High Cholesterol Mother     Cancer Father        A:  Pt's mood and anxiety have continued to improve since last visit. Pt benefited greatly from recent position change at her job, which appears to have alleviated much of her stress. Psychotropic medication regimen is helping and has contributed to better sleep as well. She continues to use adaptive self-care tools on a regular basis. Good social support. Setting appropriate boundaries with her  from whom she is  has been helpful for her. Strongly reinforced pt's progress. No safety concerns at this time. GARY 7 SCORE 10/30/2018 9/20/2018 12/12/2017 12/1/2017 11/17/2017 11/7/2017   GARY-7 Total Score 2 5 11 4 11 17     Interpretation of GARY-7 score: 5-9 = mild anxiety, 10-14 = moderate anxiety, 15+ = severe anxiety. Recommend referral to behavioral health for scores 10 or greater. PHQ Scores 10/30/2018 9/20/2018 12/12/2017 12/1/2017 11/17/2017 11/7/2017   PHQ2 Score 0 2 2 2 3 5   PHQ9 Score 3 5 6 8 13 19     Interpretation of Total Score Depression Severity: 1-4 = Minimal depression, 5-9 = Mild depression, 10-14 = Moderate depression, 15-19 = Moderately severe depression, 20-27 = Severe depression    Diagnosis:    1.  Major depressive disorder, recurrent episode, in partial remission with anxious distress [facility])        Patient Active Problem List   Diagnosis    Allergic rhinitis    Hyperglycemia    Mixed hyperlipidemia    Vitamin D deficiency    Neoplasm of uncertain behavior of skin    GERD (gastroesophageal reflux disease)    Cervical disc herniation    Depression    Allergic rhinitis due to pollen    Mild single current episode of major depressive disorder (HCC)    Nonintractable headache    Palpitations    Essential hypertension    Major depressive disorder, recurrent episode, moderate with anxious distress (HCC)    Generalized anxiety disorder         Plan:  Pt interventions:  Supportive techniques, Emphasized self-care as important for managing overall health, CBT to target maladaptive thoughts, Identified relevant behavioral strategies for targeting symptom reduction including walking/exercise, diaphragmatic breathing, ABC sheets, PMR, visualization, meditating with Calm radha, Zoloft 100mg and Emphasized importance of regular practice of relaxation strategies to target / promote stress mgmt. Pt Behavioral Change Plan:  Pt set the following goals:  1. Practice being mindful - paying attention to the present moment in a nonjudgmental way  2. Practice diaphragmatic breathing throughout the day  3. Go for walks (short or long) to manage stress  4. Complete ABC sheets to challenge negative thoughts  5. Practice progressive muscle relaxation daily  6. Try visualization exercises (e.g., taking a trip in your mind) during which you focus on what your senses would experience in that place  7. Continue using the Calm radha  8. Spend time around supportive people    Pt scheduled F/U visit in 3 weeks.

## 2018-11-27 ENCOUNTER — OFFICE VISIT (OUTPATIENT)
Dept: PSYCHOLOGY | Age: 38
End: 2018-11-27
Payer: COMMERCIAL

## 2018-11-27 DIAGNOSIS — F33.41 MAJOR DEPRESSIVE DISORDER, RECURRENT EPISODE, IN PARTIAL REMISSION WITH ANXIOUS DISTRESS (HCC): Primary | ICD-10-CM

## 2018-11-27 PROCEDURE — 90832 PSYTX W PT 30 MINUTES: CPT | Performed by: PSYCHOLOGIST

## 2018-11-27 ASSESSMENT — PATIENT HEALTH QUESTIONNAIRE - PHQ9
1. LITTLE INTEREST OR PLEASURE IN DOING THINGS: 1
2. FEELING DOWN, DEPRESSED OR HOPELESS: 0
8. MOVING OR SPEAKING SO SLOWLY THAT OTHER PEOPLE COULD HAVE NOTICED. OR THE OPPOSITE, BEING SO FIGETY OR RESTLESS THAT YOU HAVE BEEN MOVING AROUND A LOT MORE THAN USUAL: 0
5. POOR APPETITE OR OVEREATING: 0
4. FEELING TIRED OR HAVING LITTLE ENERGY: 1
9. THOUGHTS THAT YOU WOULD BE BETTER OFF DEAD, OR OF HURTING YOURSELF: 0
7. TROUBLE CONCENTRATING ON THINGS, SUCH AS READING THE NEWSPAPER OR WATCHING TELEVISION: 0
SUM OF ALL RESPONSES TO PHQ9 QUESTIONS 1 & 2: 1
SUM OF ALL RESPONSES TO PHQ QUESTIONS 1-9: 3
3. TROUBLE FALLING OR STAYING ASLEEP: 1
SUM OF ALL RESPONSES TO PHQ QUESTIONS 1-9: 3
6. FEELING BAD ABOUT YOURSELF - OR THAT YOU ARE A FAILURE OR HAVE LET YOURSELF OR YOUR FAMILY DOWN: 0

## 2018-11-27 ASSESSMENT — ANXIETY QUESTIONNAIRES
5. BEING SO RESTLESS THAT IT IS HARD TO SIT STILL: 1-SEVERAL DAYS
GAD7 TOTAL SCORE: 5
3. WORRYING TOO MUCH ABOUT DIFFERENT THINGS: 1-SEVERAL DAYS
4. TROUBLE RELAXING: 1-SEVERAL DAYS
2. NOT BEING ABLE TO STOP OR CONTROL WORRYING: 1-SEVERAL DAYS
1. FEELING NERVOUS, ANXIOUS, OR ON EDGE: 1-SEVERAL DAYS
6. BECOMING EASILY ANNOYED OR IRRITABLE: 0-NOT AT ALL SURE
7. FEELING AFRAID AS IF SOMETHING AWFUL MIGHT HAPPEN: 0-NOT AT ALL SURE

## 2018-11-27 NOTE — PROGRESS NOTES
Behavioral Health Consultation  Carlos Love Psy.D. Psychologist  11/27/2018  9:09 AM      Time spent with Patient: 30 minutes  This is patient's sixth [facility] appointment. Reason for Consult:  Depression, anxiety  Referring Provider: Bethany Peralta NP  [address] [address] [facility], [address]      S:  Pt Liliane Meyer) reported that she has been feeling more irritable since Thanksgiving. [place] let down on Thanksgiving because plans changed and she felt left out. Feeling taken advantage of by her friend as well. Started feeling more on edge and restless two weeks ago. Headaches have returned. No exercise lately. Not scheduled to see her psychiatrist until February because she has been doing well. Feels tired during the day. Unsure if it's due to the Zoloft or Trazodone.       O:  MSE:  Appearance: good hygiene and appropriate attire  Attitude: cooperative, friendly, tearful and moderate distress   Consciousness: alert  Orientation: oriented to person, place, time, general circumstance  Memory: recent and remote memory intact  Attention/Concentration: intact during session  Psychomotor Activity: normal  Eye Contact: normal  Speech: normal rate and volume, well-articulated, verbose  Mood: dyshporic  Affect: congruent with thought content and mood  Perception: within normal limits  Thought Content: within normal limits   Thought Process: logical, goal-directed, coherent   Insight: improving  Judgment: intact  Morbid ideation: no  Suicide Assessment: no suicidal ideation      History:    Medications:   Current Outpatient Prescriptions   Medication Sig Dispense Refill    escitalopram (LEXAPRO) 20 MG tablet Take 1 tablet by mouth daily 90 tablet 0    metoprolol tartrate (LOPRESSOR) 50 MG tablet Take 1 tablet by mouth 2 times daily 180 tablet 0    omeprazole (PRILOSEC) 40 MG delayed release capsule Take 1 capsule by mouth daily 30 capsule 3    ondansetron (ZOFRAN) 4 MG tablet Take 1 tablet by mouth every 8 hours managing overall health, Provided Psychoeducation re: light therapy, CBT to target maladaptive thoughts, Identified relevant behavioral strategies for targeting symptom reduction including walking/exercise, diaphragmatic breathing, ABC sheets, PMR, visualization, meditating with Calm radha, Zoloft 100mg, light therapy and Emphasized importance of regular practice of relaxation strategies to target / promote stress mgmt. Pt Behavioral Change Plan:  Pt set the following goals:  1. Practice being mindful - paying attention to the present moment in a nonjudgmental way  2. Practice diaphragmatic breathing throughout the day  3. Go for walks (short or long) to manage stress  4. Complete ABC sheets to challenge negative thoughts  5. Practice progressive muscle relaxation daily  6. Try visualization exercises (e.g., taking a trip in your mind) during which you focus on what your senses would experience in that place  7. Continue using the Calm radha  8. Spend time around supportive people  9. Consider trying light therapy. Look for a light therapy box that is 10,000 lumen or lux. Use it in morning for 30 minutes right after waking up. Pt scheduled F/U visit in 3 weeks.

## 2019-01-04 ENCOUNTER — OFFICE VISIT (OUTPATIENT)
Dept: PSYCHOLOGY | Age: 39
End: 2019-01-04
Payer: COMMERCIAL

## 2019-01-04 DIAGNOSIS — F33.41 MAJOR DEPRESSIVE DISORDER, RECURRENT EPISODE, IN PARTIAL REMISSION WITH ANXIOUS DISTRESS (HCC): Primary | ICD-10-CM

## 2019-01-04 PROCEDURE — 90832 PSYTX W PT 30 MINUTES: CPT | Performed by: PSYCHOLOGIST

## 2019-01-04 ASSESSMENT — PATIENT HEALTH QUESTIONNAIRE - PHQ9
3. TROUBLE FALLING OR STAYING ASLEEP: 1
9. THOUGHTS THAT YOU WOULD BE BETTER OFF DEAD, OR OF HURTING YOURSELF: 0
5. POOR APPETITE OR OVEREATING: 1
4. FEELING TIRED OR HAVING LITTLE ENERGY: 1
8. MOVING OR SPEAKING SO SLOWLY THAT OTHER PEOPLE COULD HAVE NOTICED. OR THE OPPOSITE, BEING SO FIGETY OR RESTLESS THAT YOU HAVE BEEN MOVING AROUND A LOT MORE THAN USUAL: 0
SUM OF ALL RESPONSES TO PHQ9 QUESTIONS 1 & 2: 1
6. FEELING BAD ABOUT YOURSELF - OR THAT YOU ARE A FAILURE OR HAVE LET YOURSELF OR YOUR FAMILY DOWN: 0
2. FEELING DOWN, DEPRESSED OR HOPELESS: 1
7. TROUBLE CONCENTRATING ON THINGS, SUCH AS READING THE NEWSPAPER OR WATCHING TELEVISION: 1
1. LITTLE INTEREST OR PLEASURE IN DOING THINGS: 0
SUM OF ALL RESPONSES TO PHQ QUESTIONS 1-9: 5
SUM OF ALL RESPONSES TO PHQ QUESTIONS 1-9: 5

## 2019-01-04 ASSESSMENT — ANXIETY QUESTIONNAIRES
3. WORRYING TOO MUCH ABOUT DIFFERENT THINGS: 1-SEVERAL DAYS
7. FEELING AFRAID AS IF SOMETHING AWFUL MIGHT HAPPEN: 0-NOT AT ALL SURE
1. FEELING NERVOUS, ANXIOUS, OR ON EDGE: 1-SEVERAL DAYS
GAD7 TOTAL SCORE: 3
5. BEING SO RESTLESS THAT IT IS HARD TO SIT STILL: 0-NOT AT ALL SURE
4. TROUBLE RELAXING: 1-SEVERAL DAYS
2. NOT BEING ABLE TO STOP OR CONTROL WORRYING: 0-NOT AT ALL SURE
6. BECOMING EASILY ANNOYED OR IRRITABLE: 0-NOT AT ALL SURE

## 2019-02-01 ENCOUNTER — OFFICE VISIT (OUTPATIENT)
Dept: PSYCHOLOGY | Age: 39
End: 2019-02-01
Payer: COMMERCIAL

## 2019-02-01 DIAGNOSIS — F33.41 MAJOR DEPRESSIVE DISORDER, RECURRENT EPISODE, IN PARTIAL REMISSION WITH ANXIOUS DISTRESS (HCC): Primary | ICD-10-CM

## 2019-02-01 PROCEDURE — 90832 PSYTX W PT 30 MINUTES: CPT | Performed by: PSYCHOLOGIST

## 2019-02-01 ASSESSMENT — ANXIETY QUESTIONNAIRES
6. BECOMING EASILY ANNOYED OR IRRITABLE: 1-SEVERAL DAYS
GAD7 TOTAL SCORE: 6
5. BEING SO RESTLESS THAT IT IS HARD TO SIT STILL: 1-SEVERAL DAYS
3. WORRYING TOO MUCH ABOUT DIFFERENT THINGS: 1-SEVERAL DAYS
4. TROUBLE RELAXING: 1-SEVERAL DAYS
7. FEELING AFRAID AS IF SOMETHING AWFUL MIGHT HAPPEN: 0-NOT AT ALL SURE
1. FEELING NERVOUS, ANXIOUS, OR ON EDGE: 1-SEVERAL DAYS
2. NOT BEING ABLE TO STOP OR CONTROL WORRYING: 1-SEVERAL DAYS

## 2019-02-01 ASSESSMENT — PATIENT HEALTH QUESTIONNAIRE - PHQ9
9. THOUGHTS THAT YOU WOULD BE BETTER OFF DEAD, OR OF HURTING YOURSELF: 0
3. TROUBLE FALLING OR STAYING ASLEEP: 1
SUM OF ALL RESPONSES TO PHQ9 QUESTIONS 1 & 2: 1
8. MOVING OR SPEAKING SO SLOWLY THAT OTHER PEOPLE COULD HAVE NOTICED. OR THE OPPOSITE, BEING SO FIGETY OR RESTLESS THAT YOU HAVE BEEN MOVING AROUND A LOT MORE THAN USUAL: 0
4. FEELING TIRED OR HAVING LITTLE ENERGY: 0
2. FEELING DOWN, DEPRESSED OR HOPELESS: 1
SUM OF ALL RESPONSES TO PHQ QUESTIONS 1-9: 4
SUM OF ALL RESPONSES TO PHQ QUESTIONS 1-9: 4
6. FEELING BAD ABOUT YOURSELF - OR THAT YOU ARE A FAILURE OR HAVE LET YOURSELF OR YOUR FAMILY DOWN: 1
10. IF YOU CHECKED OFF ANY PROBLEMS, HOW DIFFICULT HAVE THESE PROBLEMS MADE IT FOR YOU TO DO YOUR WORK, TAKE CARE OF THINGS AT HOME, OR GET ALONG WITH OTHER PEOPLE: 1
7. TROUBLE CONCENTRATING ON THINGS, SUCH AS READING THE NEWSPAPER OR WATCHING TELEVISION: 1
1. LITTLE INTEREST OR PLEASURE IN DOING THINGS: 0
5. POOR APPETITE OR OVEREATING: 0

## 2019-03-01 ENCOUNTER — OFFICE VISIT (OUTPATIENT)
Dept: PSYCHOLOGY | Age: 39
End: 2019-03-01
Payer: COMMERCIAL

## 2019-03-01 DIAGNOSIS — F33.1 MAJOR DEPRESSIVE DISORDER, RECURRENT EPISODE, MODERATE (HCC): Primary | ICD-10-CM

## 2019-03-01 DIAGNOSIS — F41.1 GENERALIZED ANXIETY DISORDER: ICD-10-CM

## 2019-03-01 PROCEDURE — 90832 PSYTX W PT 30 MINUTES: CPT | Performed by: PSYCHOLOGIST

## 2019-03-01 ASSESSMENT — PATIENT HEALTH QUESTIONNAIRE - PHQ9
6. FEELING BAD ABOUT YOURSELF - OR THAT YOU ARE A FAILURE OR HAVE LET YOURSELF OR YOUR FAMILY DOWN: 1
7. TROUBLE CONCENTRATING ON THINGS, SUCH AS READING THE NEWSPAPER OR WATCHING TELEVISION: 1
5. POOR APPETITE OR OVEREATING: 1
SUM OF ALL RESPONSES TO PHQ9 QUESTIONS 1 & 2: 3
3. TROUBLE FALLING OR STAYING ASLEEP: 2
2. FEELING DOWN, DEPRESSED OR HOPELESS: 1
SUM OF ALL RESPONSES TO PHQ QUESTIONS 1-9: 10
9. THOUGHTS THAT YOU WOULD BE BETTER OFF DEAD, OR OF HURTING YOURSELF: 0
1. LITTLE INTEREST OR PLEASURE IN DOING THINGS: 2
8. MOVING OR SPEAKING SO SLOWLY THAT OTHER PEOPLE COULD HAVE NOTICED. OR THE OPPOSITE, BEING SO FIGETY OR RESTLESS THAT YOU HAVE BEEN MOVING AROUND A LOT MORE THAN USUAL: 0
SUM OF ALL RESPONSES TO PHQ QUESTIONS 1-9: 10
10. IF YOU CHECKED OFF ANY PROBLEMS, HOW DIFFICULT HAVE THESE PROBLEMS MADE IT FOR YOU TO DO YOUR WORK, TAKE CARE OF THINGS AT HOME, OR GET ALONG WITH OTHER PEOPLE: 2
4. FEELING TIRED OR HAVING LITTLE ENERGY: 2

## 2019-03-01 ASSESSMENT — ANXIETY QUESTIONNAIRES
4. TROUBLE RELAXING: 1-SEVERAL DAYS
5. BEING SO RESTLESS THAT IT IS HARD TO SIT STILL: 1-SEVERAL DAYS
2. NOT BEING ABLE TO STOP OR CONTROL WORRYING: 1-SEVERAL DAYS
7. FEELING AFRAID AS IF SOMETHING AWFUL MIGHT HAPPEN: 0-NOT AT ALL SURE
1. FEELING NERVOUS, ANXIOUS, OR ON EDGE: 1-SEVERAL DAYS
GAD7 TOTAL SCORE: 6
3. WORRYING TOO MUCH ABOUT DIFFERENT THINGS: 2-OVER HALF THE DAYS
6. BECOMING EASILY ANNOYED OR IRRITABLE: 0-NOT AT ALL SURE

## 2019-04-02 ENCOUNTER — OFFICE VISIT (OUTPATIENT)
Dept: PSYCHOLOGY | Age: 39
End: 2019-04-02
Payer: COMMERCIAL

## 2019-04-02 DIAGNOSIS — F41.1 GENERALIZED ANXIETY DISORDER: ICD-10-CM

## 2019-04-02 DIAGNOSIS — F33.1 MAJOR DEPRESSIVE DISORDER, RECURRENT EPISODE, MODERATE (HCC): Primary | ICD-10-CM

## 2019-04-02 PROCEDURE — 90832 PSYTX W PT 30 MINUTES: CPT | Performed by: PSYCHOLOGIST

## 2019-04-02 ASSESSMENT — ANXIETY QUESTIONNAIRES
1. FEELING NERVOUS, ANXIOUS, OR ON EDGE: 3-NEARLY EVERY DAY
GAD7 TOTAL SCORE: 9
7. FEELING AFRAID AS IF SOMETHING AWFUL MIGHT HAPPEN: 0-NOT AT ALL SURE
2. NOT BEING ABLE TO STOP OR CONTROL WORRYING: 1-SEVERAL DAYS
3. WORRYING TOO MUCH ABOUT DIFFERENT THINGS: 1-SEVERAL DAYS
5. BEING SO RESTLESS THAT IT IS HARD TO SIT STILL: 1-SEVERAL DAYS
4. TROUBLE RELAXING: 1-SEVERAL DAYS
6. BECOMING EASILY ANNOYED OR IRRITABLE: 2-OVER HALF THE DAYS

## 2019-04-02 ASSESSMENT — PATIENT HEALTH QUESTIONNAIRE - PHQ9
5. POOR APPETITE OR OVEREATING: 0
6. FEELING BAD ABOUT YOURSELF - OR THAT YOU ARE A FAILURE OR HAVE LET YOURSELF OR YOUR FAMILY DOWN: 1
9. THOUGHTS THAT YOU WOULD BE BETTER OFF DEAD, OR OF HURTING YOURSELF: 0
SUM OF ALL RESPONSES TO PHQ9 QUESTIONS 1 & 2: 2
2. FEELING DOWN, DEPRESSED OR HOPELESS: 1
8. MOVING OR SPEAKING SO SLOWLY THAT OTHER PEOPLE COULD HAVE NOTICED. OR THE OPPOSITE, BEING SO FIGETY OR RESTLESS THAT YOU HAVE BEEN MOVING AROUND A LOT MORE THAN USUAL: 0
4. FEELING TIRED OR HAVING LITTLE ENERGY: 0
3. TROUBLE FALLING OR STAYING ASLEEP: 0
SUM OF ALL RESPONSES TO PHQ QUESTIONS 1-9: 4
10. IF YOU CHECKED OFF ANY PROBLEMS, HOW DIFFICULT HAVE THESE PROBLEMS MADE IT FOR YOU TO DO YOUR WORK, TAKE CARE OF THINGS AT HOME, OR GET ALONG WITH OTHER PEOPLE: 1
SUM OF ALL RESPONSES TO PHQ QUESTIONS 1-9: 4
1. LITTLE INTEREST OR PLEASURE IN DOING THINGS: 1
7. TROUBLE CONCENTRATING ON THINGS, SUCH AS READING THE NEWSPAPER OR WATCHING TELEVISION: 1

## 2019-04-02 NOTE — PROGRESS NOTES
Behavioral Health Consultation  Kingsley Frazier Psy.D. Psychologist  4/2/2019  9:07 AM      Time spent with Patient: 30 minutes  This is patient's tenth [facility] appointment. Reason for Consult:  Depression, anxiety  Referring Provider: Chris Sharma, MARICHUY  [address] [facility], [address] [place] [address]  Current PCP: Leslie Astorga MD      S:  Pt Nelli Garza) reported that her \"Mom\" (i.e., friends' mother) had a brain aneurism and has been in the ICU for 2 weeks. Pt lives with Mom and Ivy Banuelos, Mom's long-term partner Ivy Banuelos, and pt has been helping to provide care for both of them since she moved in. Pt, Ariana Hooks, Mom and Peterson live in the house together. Giulia and Ariana Hooks are the daughters who have been helping as much as possible, whereas Yoon Neither refuses to help with Ivy Banuelos. Ariana Hooks deals with depression as well, so pt feels responsible for helping her. Pt has not gotten any time to focus on herself lately. Doesn't feel she can take time away to go to the gym she joined last month. Still trying to stay in touch with her friend Machelle Issa, which has been hard because of his work schedule. Not feeling as bad as she has in the past, but still struggling lately. Sees her psychiatrist again at the end of April.     O:  MSE:  Appearance: good hygiene and appropriate attire  Attitude: cooperative, friendly and mild distress   Consciousness: alert  Orientation: oriented to person, place, time, general circumstance  Memory: recent and remote memory intact  Attention/Concentration: intact during session  Psychomotor Activity: normal  Eye Contact: normal  Speech: normal rate and volume, well-articulated   Mood:  dyshporic and anxious  Affect: congruent with thought content and mood   Perception: within normal limits  Thought Content: within normal limits   Thought Process: logical, goal-directed, coherent   Insight: improving  Judgment: intact  Morbid ideation: no  Suicide Assessment: no suicidal ideation      History:    Medications: scores 10 or greater. PHQ Scores 4/2/2019 3/1/2019 2/1/2019 1/4/2019 11/27/2018 10/30/2018 9/20/2018   PHQ2 Score 2 3 1 1 1 0 2   PHQ9 Score 4 10 4 5 3 3 5     Interpretation of Total Score Depression Severity: 1-4 = Minimal depression, 5-9 = Mild depression, 10-14 = Moderate depression, 15-19 = Moderately severe depression, 20-27 = Severe depression    Diagnosis:    1. Major depressive disorder, recurrent episode, moderate ([address]    2. Generalized anxiety disorder        Patient Active Problem List   Diagnosis    Allergic rhinitis    Hyperglycemia    Mixed hyperlipidemia    Vitamin D deficiency    Neoplasm of uncertain behavior of skin    GERD (gastroesophageal reflux disease)    Cervical disc herniation    Depression    Allergic rhinitis due to pollen    Mild single current episode of major depressive disorder (HCC)    Nonintractable headache    Palpitations    Essential hypertension    Major depressive disorder, recurrent episode, moderate with anxious distress (HCC)    Generalized anxiety disorder         Plan:  Pt interventions:  Supportive techniques, Emphasized self-care as important for managing overall health and Emphasized importance of regular practice of relaxation strategies to target / promote symptom reduction and stress mgmt. Pt Behavioral Change Plan:  Pt set the following goals:  1. Go for walks (short or long) to manage stress  2. Practice being mindful - paying attention to the present moment in a nonjudgmental way  3. Practice diaphragmatic breathing throughout the day  4. Complete ABC sheets to challenge negative thoughts  5. Practice progressive muscle relaxation daily  6. Try visualization exercises (e.g., taking a trip in your mind) during which you focus on what your senses would experience in that place  7. Continue using the Calm radha  8. Spend time around supportive people  9.  Do light therapy in morning for 30 minutes right after waking up    Pt scheduled F/U visit in 2 weeks.

## 2019-04-02 NOTE — PATIENT INSTRUCTIONS
Goals: 1. Go for walks (short or long) to manage stress  2. Practice being mindful - paying attention to the present moment in a nonjudgmental way  3. Practice diaphragmatic breathing throughout the day  4. Complete ABC sheets to challenge negative thoughts  5. Practice progressive muscle relaxation daily  6. Try visualization exercises (e.g., taking a trip in your mind) during which you focus on what your senses would experience in that place  7. Continue using the Calm radha  8. Spend time around supportive people  9.  Do light therapy in morning for 30 minutes right after waking up

## 2019-04-16 ENCOUNTER — OFFICE VISIT (OUTPATIENT)
Dept: PSYCHOLOGY | Age: 39
End: 2019-04-16
Payer: COMMERCIAL

## 2019-04-16 DIAGNOSIS — F33.1 MAJOR DEPRESSIVE DISORDER, RECURRENT EPISODE, MODERATE (HCC): Primary | ICD-10-CM

## 2019-04-16 DIAGNOSIS — F41.1 GENERALIZED ANXIETY DISORDER: ICD-10-CM

## 2019-04-16 PROCEDURE — 90832 PSYTX W PT 30 MINUTES: CPT | Performed by: PSYCHOLOGIST

## 2019-04-16 ASSESSMENT — PATIENT HEALTH QUESTIONNAIRE - PHQ9
1. LITTLE INTEREST OR PLEASURE IN DOING THINGS: 2
4. FEELING TIRED OR HAVING LITTLE ENERGY: 2
2. FEELING DOWN, DEPRESSED OR HOPELESS: 1
7. TROUBLE CONCENTRATING ON THINGS, SUCH AS READING THE NEWSPAPER OR WATCHING TELEVISION: 2
8. MOVING OR SPEAKING SO SLOWLY THAT OTHER PEOPLE COULD HAVE NOTICED. OR THE OPPOSITE, BEING SO FIGETY OR RESTLESS THAT YOU HAVE BEEN MOVING AROUND A LOT MORE THAN USUAL: 1
5. POOR APPETITE OR OVEREATING: 2
SUM OF ALL RESPONSES TO PHQ QUESTIONS 1-9: 12
6. FEELING BAD ABOUT YOURSELF - OR THAT YOU ARE A FAILURE OR HAVE LET YOURSELF OR YOUR FAMILY DOWN: 1
3. TROUBLE FALLING OR STAYING ASLEEP: 1
SUM OF ALL RESPONSES TO PHQ9 QUESTIONS 1 & 2: 3
10. IF YOU CHECKED OFF ANY PROBLEMS, HOW DIFFICULT HAVE THESE PROBLEMS MADE IT FOR YOU TO DO YOUR WORK, TAKE CARE OF THINGS AT HOME, OR GET ALONG WITH OTHER PEOPLE: 2
9. THOUGHTS THAT YOU WOULD BE BETTER OFF DEAD, OR OF HURTING YOURSELF: 0
SUM OF ALL RESPONSES TO PHQ QUESTIONS 1-9: 12

## 2019-04-16 ASSESSMENT — ANXIETY QUESTIONNAIRES
5. BEING SO RESTLESS THAT IT IS HARD TO SIT STILL: 2-OVER HALF THE DAYS
6. BECOMING EASILY ANNOYED OR IRRITABLE: 2-OVER HALF THE DAYS
2. NOT BEING ABLE TO STOP OR CONTROL WORRYING: 2-OVER HALF THE DAYS
7. FEELING AFRAID AS IF SOMETHING AWFUL MIGHT HAPPEN: 1-SEVERAL DAYS
1. FEELING NERVOUS, ANXIOUS, OR ON EDGE: 2-OVER HALF THE DAYS
4. TROUBLE RELAXING: 2-OVER HALF THE DAYS
3. WORRYING TOO MUCH ABOUT DIFFERENT THINGS: 2-OVER HALF THE DAYS
GAD7 TOTAL SCORE: 13

## 2019-04-16 NOTE — PATIENT INSTRUCTIONS
Goals: 1. Go for walks (short or long) to manage stress  2. Practice being mindful - paying attention to the present moment in a nonjudgmental way  3. Practice diaphragmatic breathing throughout the day  4. Complete ABC sheets to challenge negative thoughts  5. Practice progressive muscle relaxation daily  6. Try visualization exercises (e.g., taking a trip in your mind) during which you focus on what your senses would experience in that place  7. Continue using the Calm radha  8. Spend time around supportive people  9. Do light therapy in morning for 30 minutes right after waking up  10.  Talk to the girls about how you've been feeling and ways to make more time for your self-care

## 2019-04-16 NOTE — PROGRESS NOTES
Behavioral Health Consultation  Avel Mendiola Psy.D. Psychologist  4/16/2019  11:31 AM      Time spent with Patient: 30 minutes  This is patient's eleventh [facility] appointment. Reason for Consult:  Depression, anxiety  Referring Provider: MARICHUY [address], [address]  Current PCP: Amanda Sanchez MD      S:  Pt Reynaldo Mcqueen) reported that she's not been feeling like herself for the past 2 weeks. Feeling angry, aggravated. Feeling trapped because of the caregiving situation, which reminds pt of when she cared for her parents (i.e., that was how pt lost herself to begin with). Tends to put on a happy face for her friends, who are likely unaware of how hard this is been for patient. Someone has to watch [employer] at all times. Still not finding time to go to the gym.         O:  MSE:  Appearance: good hygiene and appropriate attire  Attitude: cooperative, friendly and moderate distress   Consciousness: alert  Orientation: oriented to person, place, time, general circumstance  Memory: recent and remote memory intact  Attention/Concentration: intact during session  Psychomotor Activity: normal  Eye Contact: normal  Speech: normal rate and volume, well-articulated   Mood:  dyshporic and anxious  Affect: congruent with thought content and mood   Perception: within normal limits  Thought Content: within normal limits   Thought Process: logical, goal-directed, coherent   Insight: improving  Judgment: intact  Morbid ideation: no  Suicide Assessment: no suicidal ideation      History:    Medications:   Current Outpatient Medications   Medication Sig Dispense Refill    escitalopram (LEXAPRO) 20 MG tablet Take 1 tablet by mouth daily 90 tablet 0    metoprolol tartrate (LOPRESSOR) 50 MG tablet Take 1 tablet by mouth 2 times daily 180 tablet 0    omeprazole (PRILOSEC) 40 MG delayed release capsule Take 1 capsule by mouth daily 30 capsule 3    ondansetron (ZOFRAN) 4 MG tablet Take 1 tablet by mouth every 8 hours as needed for Nausea or Vomiting 30 tablet 0    sucralfate (CARAFATE) 1 GM tablet Take 1 tablet by mouth 4 times daily 120 tablet 3    montelukast (SINGULAIR) 10 MG tablet Take 1 tablet by mouth daily 90 tablet 1    albuterol sulfate HFA (PROAIR HFA) 108 (90 Base) MCG/ACT inhaler Inhale 2 puffs into the lungs every 6 hours as needed for Wheezing or Shortness of Breath 3 Inhaler 1    valACYclovir (VALTREX) 1 g tablet Take 2 tablets by mouth 2 times daily 4 tablet 2    Fluticasone Propionate (FLONASE NA) by Nasal route      Cholecalciferol (VITAMIN D3) 2000 units CAPS Take 2,000 Units by mouth 2 times daily      butalbital-acetaminophen-caffeine (FIORICET, ESGIC) -40 MG per tablet Take 1 tablet by mouth every 4 hours as needed for Headaches 180 tablet 3    clotrimazole-betamethasone (LOTRISONE) 1-0.05 % cream Apply topically 2 times daily. 1 Tube 1    cetirizine (ZYRTEC) 10 MG tablet Take 10 mg by mouth daily. No current facility-administered medications for this visit.         Social History:   Social History     Socioeconomic History    Marital status:      Spouse name: Not on file    Number of children: Not on file    Years of education: Not on file    Highest education level: Not on file   Occupational History    Not on file   Social Needs    Financial resource strain: Not on file    Food insecurity:     Worry: Not on file     Inability: Not on file    Transportation needs:     Medical: Not on file     Non-medical: Not on file   Tobacco Use    Smoking status: Never Smoker    Smokeless tobacco: Never Used   Substance and Sexual Activity    Alcohol use: No     Alcohol/week: 0.0 oz     Comment: occ    Drug use: No    Sexual activity: Yes     Partners: Male   Lifestyle    Physical activity:     Days per week: Not on file     Minutes per session: Not on file    Stress: Not on file   Relationships    Social connections:     Talks on phone: Not on file Gets together: Not on file     Attends Rastafarian service: Not on file     Active member of club or organization: Not on file     Attends meetings of clubs or organizations: Not on file     Relationship status: Not on file    Intimate partner violence:     Fear of current or ex partner: Not on file     Emotionally abused: Not on file     Physically abused: Not on file     Forced sexual activity: Not on file   Other Topics Concern    Not on file   Social History Narrative    Not on file       TOBACCO:   reports that she has never smoked. She has never used smokeless tobacco.  ETOH:   reports that she does not drink alcohol. Family History:   Family History   Problem Relation Age of Onset    Diabetes Mother     High Cholesterol Mother     Cancer Father        A:  Patient's distress has continued to increase since last visit due to reduced focus on self-care because of overwhelming caregiving responsibilities. Normalized and validated patient's distress. Explored the idea of sharing her feelings with her friends so she can set appropriate boundaries to create more space for her own self-care. Patient expressed understanding and agreement. No safety concerns at this time. GARY 7 SCORE 4/16/2019 4/2/2019 3/1/2019 2/1/2019 1/4/2019 11/27/2018 10/30/2018   GARY-7 Total Score 13 9 6 6 3 5 2     Interpretation of GARY-7 score: 5-9 = mild anxiety, 10-14 = moderate anxiety, 15+ = severe anxiety. Recommend referral to behavioral health for scores 10 or greater. PHQ Scores 4/16/2019 4/2/2019 3/1/2019 2/1/2019 1/4/2019 11/27/2018 10/30/2018   PHQ2 Score 3 2 3 1 1 1 0   PHQ9 Score 12 4 10 4 5 3 3     Interpretation of Total Score Depression Severity: 1-4 = Minimal depression, 5-9 = Mild depression, 10-14 = Moderate depression, 15-19 = Moderately severe depression, 20-27 = Severe depression    Diagnosis:    1. Major depressive disorder, recurrent episode, moderate ([address]    2.  Generalized anxiety disorder Patient Active Problem List   Diagnosis    Allergic rhinitis    Hyperglycemia    Mixed hyperlipidemia    Vitamin D deficiency    Neoplasm of uncertain behavior of skin    GERD (gastroesophageal reflux disease)    Cervical disc herniation    Depression    Allergic rhinitis due to pollen    Mild single current episode of major depressive disorder (HCC)    Nonintractable headache    Palpitations    Essential hypertension    Major depressive disorder, recurrent episode, moderate with anxious distress (HCC)    Generalized anxiety disorder         Plan:  Pt interventions:  Trained in improving communication skills, Supportive techniques and Emphasized self-care as important for managing overall health. Pt Behavioral Change Plan:  Pt set the following goals:  1. Go for walks (short or long) to manage stress  2. Practice being mindful - paying attention to the present moment in a nonjudgmental way  3. Practice diaphragmatic breathing throughout the day  4. Complete ABC sheets to challenge negative thoughts  5. Practice progressive muscle relaxation daily  6. Try visualization exercises (e.g., taking a trip in your mind) during which you focus on what your senses would experience in that place  7. Continue using the Calm radha  8. Spend time around supportive people  9. Do light therapy in morning for 30 minutes right after waking up  10. Talk to the girls about how you've been feeling and ways to make more time for your self-care    Pt scheduled F/U visit in 3-4 weeks.

## 2019-05-10 ENCOUNTER — OFFICE VISIT (OUTPATIENT)
Dept: PSYCHOLOGY | Age: 39
End: 2019-05-10
Payer: COMMERCIAL

## 2019-05-10 DIAGNOSIS — F41.1 GENERALIZED ANXIETY DISORDER: ICD-10-CM

## 2019-05-10 DIAGNOSIS — F33.1 MAJOR DEPRESSIVE DISORDER, RECURRENT EPISODE, MODERATE (HCC): Primary | ICD-10-CM

## 2019-05-10 PROCEDURE — 90832 PSYTX W PT 30 MINUTES: CPT | Performed by: PSYCHOLOGIST

## 2019-05-10 ASSESSMENT — ANXIETY QUESTIONNAIRES
5. BEING SO RESTLESS THAT IT IS HARD TO SIT STILL: 2-OVER HALF THE DAYS
6. BECOMING EASILY ANNOYED OR IRRITABLE: 2-OVER HALF THE DAYS
1. FEELING NERVOUS, ANXIOUS, OR ON EDGE: 2-OVER HALF THE DAYS
7. FEELING AFRAID AS IF SOMETHING AWFUL MIGHT HAPPEN: 1-SEVERAL DAYS
3. WORRYING TOO MUCH ABOUT DIFFERENT THINGS: 2-OVER HALF THE DAYS
GAD7 TOTAL SCORE: 13
2. NOT BEING ABLE TO STOP OR CONTROL WORRYING: 2-OVER HALF THE DAYS
4. TROUBLE RELAXING: 2-OVER HALF THE DAYS

## 2019-05-10 ASSESSMENT — PATIENT HEALTH QUESTIONNAIRE - PHQ9
9. THOUGHTS THAT YOU WOULD BE BETTER OFF DEAD, OR OF HURTING YOURSELF: 0
SUM OF ALL RESPONSES TO PHQ QUESTIONS 1-9: 12
SUM OF ALL RESPONSES TO PHQ QUESTIONS 1-9: 12
3. TROUBLE FALLING OR STAYING ASLEEP: 2
7. TROUBLE CONCENTRATING ON THINGS, SUCH AS READING THE NEWSPAPER OR WATCHING TELEVISION: 2
SUM OF ALL RESPONSES TO PHQ9 QUESTIONS 1 & 2: 2
6. FEELING BAD ABOUT YOURSELF - OR THAT YOU ARE A FAILURE OR HAVE LET YOURSELF OR YOUR FAMILY DOWN: 2
4. FEELING TIRED OR HAVING LITTLE ENERGY: 2
5. POOR APPETITE OR OVEREATING: 1
1. LITTLE INTEREST OR PLEASURE IN DOING THINGS: 1
2. FEELING DOWN, DEPRESSED OR HOPELESS: 1
10. IF YOU CHECKED OFF ANY PROBLEMS, HOW DIFFICULT HAVE THESE PROBLEMS MADE IT FOR YOU TO DO YOUR WORK, TAKE CARE OF THINGS AT HOME, OR GET ALONG WITH OTHER PEOPLE: 2
8. MOVING OR SPEAKING SO SLOWLY THAT OTHER PEOPLE COULD HAVE NOTICED. OR THE OPPOSITE, BEING SO FIGETY OR RESTLESS THAT YOU HAVE BEEN MOVING AROUND A LOT MORE THAN USUAL: 1

## 2019-05-10 NOTE — PROGRESS NOTES
Behavioral Health Consultation  Vicky Lewis Psy.D. Psychologist  5/10/2019  11:29 AM      Time spent with Patient: 30 minutes  This is patient's twelfth [facility] appointment. Reason for Consult:  Depression, anxiety  Referring Provider: Phil Castellanos NP  [address] [facility], [address]  Current PCP: Marlon Trejo MD      S:  Pt Reaves ) reported that she's doing alright. Talked to the girls about her stress, and Giulia suggested pt consider moving out. Pt doesn't feel ready to live alone, would feel too isolated. Also cannot afford it. She wants to participate in caring for Mom and Jessica Naik, but she also feels overwhelmed by it all and needs time for herself. Pt went to the gym twice, which she enjoyed. Worried about being on a behavioral plan at work because of time she took off before she was hospitalized. Wants to have intermittent FMLA available to attend appointments as needed.       O:  MSE:  Appearance: good hygiene and appropriate attire  Attitude: cooperative, friendly and moderate distress   Consciousness: alert  Orientation: oriented to person, place, time, general circumstance  Memory: recent and remote memory intact  Attention/Concentration: intact during session  Psychomotor Activity: normal  Eye Contact: normal  Speech: normal rate and volume, well-articulated   Mood:  dyshporic and anxious  Affect: congruent with thought content and mood   Perception: within normal limits  Thought Content: within normal limits   Thought Process: logical, goal-directed, coherent   Insight: improving  Judgment: intact  Morbid ideation: no  Suicide Assessment: no suicidal ideation      History:    Medications:   Current Outpatient Medications   Medication Sig Dispense Refill    escitalopram (LEXAPRO) 20 MG tablet Take 1 tablet by mouth daily 90 tablet 0    metoprolol tartrate (LOPRESSOR) 50 MG tablet Take 1 tablet by mouth 2 times daily 180 tablet 0    omeprazole (PRILOSEC) 40 MG delayed release capsule Take 1 capsule by mouth daily 30 capsule 3    ondansetron (ZOFRAN) 4 MG tablet Take 1 tablet by mouth every 8 hours as needed for Nausea or Vomiting 30 tablet 0    sucralfate (CARAFATE) 1 GM tablet Take 1 tablet by mouth 4 times daily 120 tablet 3    montelukast (SINGULAIR) 10 MG tablet Take 1 tablet by mouth daily 90 tablet 1    albuterol sulfate HFA (PROAIR HFA) 108 (90 Base) MCG/ACT inhaler Inhale 2 puffs into the lungs every 6 hours as needed for Wheezing or Shortness of Breath 3 Inhaler 1    valACYclovir (VALTREX) 1 g tablet Take 2 tablets by mouth 2 times daily 4 tablet 2    Fluticasone Propionate (FLONASE NA) by Nasal route      Cholecalciferol (VITAMIN D3) 2000 units CAPS Take 2,000 Units by mouth 2 times daily      butalbital-acetaminophen-caffeine (FIORICET, ESGIC) -40 MG per tablet Take 1 tablet by mouth every 4 hours as needed for Headaches 180 tablet 3    clotrimazole-betamethasone (LOTRISONE) 1-0.05 % cream Apply topically 2 times daily. 1 Tube 1    cetirizine (ZYRTEC) 10 MG tablet Take 10 mg by mouth daily. No current facility-administered medications for this visit.         Social History:   Social History     Socioeconomic History    Marital status:      Spouse name: Not on file    Number of children: Not on file    Years of education: Not on file    Highest education level: Not on file   Occupational History    Not on file   Social Needs    Financial resource strain: Not on file    Food insecurity:     Worry: Not on file     Inability: Not on file    Transportation needs:     Medical: Not on file     Non-medical: Not on file   Tobacco Use    Smoking status: Never Smoker    Smokeless tobacco: Never Used   Substance and Sexual Activity    Alcohol use: No     Alcohol/week: 0.0 oz     Comment: occ    Drug use: No    Sexual activity: Yes     Partners: Male   Lifestyle    Physical activity:     Days per week: Not on file     Minutes per session: Not on file    Stress: Not on file   Relationships    Social connections:     Talks on phone: Not on file     Gets together: Not on file     Attends Yazidi service: Not on file     Active member of club or organization: Not on file     Attends meetings of clubs or organizations: Not on file     Relationship status: Not on file    Intimate partner violence:     Fear of current or ex partner: Not on file     Emotionally abused: Not on file     Physically abused: Not on file     Forced sexual activity: Not on file   Other Topics Concern    Not on file   Social History Narrative    Not on file       TOBACCO:   reports that she has never smoked. She has never used smokeless tobacco.  ETOH:   reports that she does not drink alcohol. Family History:   Family History   Problem Relation Age of Onset    Diabetes Mother     High Cholesterol Mother     Cancer Father        A:  Patient's distress remains elevated but stable. Reinforced her efforts to set clear boundaries and take more time for self-care. Explored various options related to jobs and housing. Validated her distress. Recommended she ask her psychiatrist complete [place] paperwork, although this writer may be willing to do so if needed. No safety concerns at this time. GARY 7 SCORE 5/10/2019 4/16/2019 4/2/2019 3/1/2019 2/1/2019 1/4/2019 11/27/2018   GARY-7 Total Score 13 13 9 6 6 3 5     Interpretation of GARY-7 score: 5-9 = mild anxiety, 10-14 = moderate anxiety, 15+ = severe anxiety. Recommend referral to behavioral health for scores 10 or greater. PHQ Scores 5/10/2019 4/16/2019 4/2/2019 3/1/2019 2/1/2019 1/4/2019 11/27/2018   PHQ2 Score 2 3 2 3 1 1 1   PHQ9 Score 12 12 4 10 4 5 3     Interpretation of Total Score Depression Severity: 1-4 = Minimal depression, 5-9 = Mild depression, 10-14 = Moderate depression, 15-19 = Moderately severe depression, 20-27 = Severe depression    Diagnosis:    1.  Major depressive disorder, recurrent episode, moderate ([facility] [address] [facility]    2. Generalized anxiety disorder        Patient Active Problem List   Diagnosis    Allergic rhinitis    Hyperglycemia    Mixed hyperlipidemia    Vitamin D deficiency    Neoplasm of uncertain behavior of skin    GERD (gastroesophageal reflux disease)    Cervical disc herniation    Depression    Allergic rhinitis due to pollen    Mild single current episode of major depressive disorder (HCC)    Nonintractable headache    Palpitations    Essential hypertension    Major depressive disorder, recurrent episode, moderate with anxious distress (HCC)    Generalized anxiety disorder         Plan:  Pt interventions:  Trained in improving communication skills, Supportive techniques and Emphasized self-care as important for managing overall health. Pt Behavioral Change Plan:  Pt set the following goals:  1. Go for walks (short or long) to manage stress  2. Practice being mindful - paying attention to the present moment in a nonjudgmental way  3. Practice diaphragmatic breathing throughout the day  4. Complete ABC sheets to challenge negative thoughts  5. Practice progressive muscle relaxation daily  6. Try visualization exercises (e.g., taking a trip in your mind) during which you focus on what your senses would experience in that place  7. Continue using the Calm radha  8. Spend time around supportive people  9. Do light therapy in morning for 30 minutes right after waking up    Pt scheduled F/U visit in 2 weeks.

## 2019-05-28 ENCOUNTER — OFFICE VISIT (OUTPATIENT)
Dept: PSYCHOLOGY | Age: 39
End: 2019-05-28
Payer: COMMERCIAL

## 2019-05-28 DIAGNOSIS — F41.1 GENERALIZED ANXIETY DISORDER: ICD-10-CM

## 2019-05-28 DIAGNOSIS — F33.0 MAJOR DEPRESSIVE DISORDER, RECURRENT EPISODE, MILD (HCC): Primary | ICD-10-CM

## 2019-05-28 PROCEDURE — 90832 PSYTX W PT 30 MINUTES: CPT | Performed by: PSYCHOLOGIST

## 2019-05-28 ASSESSMENT — PATIENT HEALTH QUESTIONNAIRE - PHQ9
SUM OF ALL RESPONSES TO PHQ9 QUESTIONS 1 & 2: 2
5. POOR APPETITE OR OVEREATING: 2
SUM OF ALL RESPONSES TO PHQ QUESTIONS 1-9: 13
4. FEELING TIRED OR HAVING LITTLE ENERGY: 2
9. THOUGHTS THAT YOU WOULD BE BETTER OFF DEAD, OR OF HURTING YOURSELF: 0
6. FEELING BAD ABOUT YOURSELF - OR THAT YOU ARE A FAILURE OR HAVE LET YOURSELF OR YOUR FAMILY DOWN: 1
8. MOVING OR SPEAKING SO SLOWLY THAT OTHER PEOPLE COULD HAVE NOTICED. OR THE OPPOSITE, BEING SO FIGETY OR RESTLESS THAT YOU HAVE BEEN MOVING AROUND A LOT MORE THAN USUAL: 2
10. IF YOU CHECKED OFF ANY PROBLEMS, HOW DIFFICULT HAVE THESE PROBLEMS MADE IT FOR YOU TO DO YOUR WORK, TAKE CARE OF THINGS AT HOME, OR GET ALONG WITH OTHER PEOPLE: 2
1. LITTLE INTEREST OR PLEASURE IN DOING THINGS: 1
SUM OF ALL RESPONSES TO PHQ QUESTIONS 1-9: 13
2. FEELING DOWN, DEPRESSED OR HOPELESS: 1
7. TROUBLE CONCENTRATING ON THINGS, SUCH AS READING THE NEWSPAPER OR WATCHING TELEVISION: 2
3. TROUBLE FALLING OR STAYING ASLEEP: 2

## 2019-05-28 ASSESSMENT — ANXIETY QUESTIONNAIRES
6. BECOMING EASILY ANNOYED OR IRRITABLE: 2-OVER HALF THE DAYS
4. TROUBLE RELAXING: 2-OVER HALF THE DAYS
7. FEELING AFRAID AS IF SOMETHING AWFUL MIGHT HAPPEN: 1-SEVERAL DAYS
GAD7 TOTAL SCORE: 11
1. FEELING NERVOUS, ANXIOUS, OR ON EDGE: 2-OVER HALF THE DAYS
3. WORRYING TOO MUCH ABOUT DIFFERENT THINGS: 1-SEVERAL DAYS
5. BEING SO RESTLESS THAT IT IS HARD TO SIT STILL: 2-OVER HALF THE DAYS
2. NOT BEING ABLE TO STOP OR CONTROL WORRYING: 1-SEVERAL DAYS

## 2019-05-28 NOTE — PROGRESS NOTES
Behavioral Health Consultation  Cher Boyd Psy.D. Psychologist  5/28/2019  1:58 PM      Time spent with Patient: 30 minutes  This is patient's thirteenth [facility] appointment. Reason for Consult:  Depression, anxiety  Referring Provider: Lidia Weathers, MARICHUY  [address] [address]  Current PCP: Gary Bo MD      S:  Pt Grayce Angelucci) reported that she's been feeling better. Communication has improved, which is helping her to avoid conflict and stress surrounding caregiving responsibilities. Pt has been caring for herself first before taking care of Chicho Aguilar in the morning. Mom has transitioned to a rehab facility. Unsure how they will deal with Mom's care needs once she comes home. Sleep has been restless. Trazodone helps her fall asleep but still restless during the night. [place] that she can volunteer to sit with dying patients in the hospital. Likes this idea but wanted to run it by this writer before committing to it.        O:  MSE:  Appearance: good hygiene and appropriate attire  Attitude: cooperative, friendly and mild distress   Consciousness: alert  Orientation: oriented to person, place, time, general circumstance  Memory: recent and remote memory intact  Attention/Concentration: intact during session  Psychomotor Activity: normal  Eye Contact: normal  Speech: normal rate and volume, well-articulated   Mood: mildly dyshporic and anxious  Affect: congruent with thought content and mood, brighter, calmer   Perception: within normal limits  Thought Content: within normal limits   Thought Process: logical, goal-directed, coherent   Insight: improving  Judgment: intact  Morbid ideation: no  Suicide Assessment: no suicidal ideation      History:    Medications:   Current Outpatient Medications   Medication Sig Dispense Refill    escitalopram (LEXAPRO) 20 MG tablet Take 1 tablet by mouth daily 90 tablet 0    metoprolol tartrate (LOPRESSOR) 50 MG tablet Take 1 tablet by mouth 2 times daily 180 tablet 0    omeprazole (PRILOSEC) 40 MG delayed release capsule Take 1 capsule by mouth daily 30 capsule 3    ondansetron (ZOFRAN) 4 MG tablet Take 1 tablet by mouth every 8 hours as needed for Nausea or Vomiting 30 tablet 0    sucralfate (CARAFATE) 1 GM tablet Take 1 tablet by mouth 4 times daily 120 tablet 3    montelukast (SINGULAIR) 10 MG tablet Take 1 tablet by mouth daily 90 tablet 1    albuterol sulfate HFA (PROAIR HFA) 108 (90 Base) MCG/ACT inhaler Inhale 2 puffs into the lungs every 6 hours as needed for Wheezing or Shortness of Breath 3 Inhaler 1    valACYclovir (VALTREX) 1 g tablet Take 2 tablets by mouth 2 times daily 4 tablet 2    Fluticasone Propionate (FLONASE NA) by Nasal route      Cholecalciferol (VITAMIN D3) 2000 units CAPS Take 2,000 Units by mouth 2 times daily      butalbital-acetaminophen-caffeine (FIORICET, ESGIC) -40 MG per tablet Take 1 tablet by mouth every 4 hours as needed for Headaches 180 tablet 3    clotrimazole-betamethasone (LOTRISONE) 1-0.05 % cream Apply topically 2 times daily. 1 Tube 1    cetirizine (ZYRTEC) 10 MG tablet Take 10 mg by mouth daily. No current facility-administered medications for this visit.         Social History:   Social History     Socioeconomic History    Marital status:      Spouse name: Not on file    Number of children: Not on file    Years of education: Not on file    Highest education level: Not on file   Occupational History    Not on file   Social Needs    Financial resource strain: Not on file    Food insecurity:     Worry: Not on file     Inability: Not on file    Transportation needs:     Medical: Not on file     Non-medical: Not on file   Tobacco Use    Smoking status: Never Smoker    Smokeless tobacco: Never Used   Substance and Sexual Activity    Alcohol use: No     Alcohol/week: 0.0 oz     Comment: occ    Drug use: No    Sexual activity: Yes     Partners: Male   Lifestyle    Physical activity:     Days per week: Not on file     Minutes per session: Not on file    Stress: Not on file   Relationships    Social connections:     Talks on phone: Not on file     Gets together: Not on file     Attends Zoroastrian service: Not on file     Active member of club or organization: Not on file     Attends meetings of clubs or organizations: Not on file     Relationship status: Not on file    Intimate partner violence:     Fear of current or ex partner: Not on file     Emotionally abused: Not on file     Physically abused: Not on file     Forced sexual activity: Not on file   Other Topics Concern    Not on file   Social History Narrative    Not on file       TOBACCO:   reports that she has never smoked. She has never used smokeless tobacco.  ETOH:   reports that she does not drink alcohol. Family History:   Family History   Problem Relation Age of Onset    Diabetes Mother     High Cholesterol Mother     Cancer Father        A:  Patient's distress has begun trending downward as communication improves and boundaries become firmer. Explored her interest in volunteering with hospice patients. Highlighted the impact this might have on her self-care, which helped patient realize that this may likely not be the right time to start this activity. Explored ways to help her engage more with healthy forms of self-care, such as exercise. Reinforced her progress. No safety concerns at this time. GARY 7 SCORE 5/28/2019 5/10/2019 4/16/2019 4/2/2019 3/1/2019 2/1/2019 1/4/2019   GARY-7 Total Score 11 13 13 9 6 6 3     Interpretation of GARY-7 score: 5-9 = mild anxiety, 10-14 = moderate anxiety, 15+ = severe anxiety. Recommend referral to behavioral health for scores 10 or greater.     PHQ Scores 5/28/2019 5/10/2019 4/16/2019 4/2/2019 3/1/2019 2/1/2019 1/4/2019   PHQ2 Score 2 2 3 2 3 1 1   PHQ9 Score 13 12 12 4 10 4 5     Interpretation of Total Score Depression Severity: 1-4 = Minimal depression, 5-9 = Mild depression, 10-14 = Moderate depression, 15-19 = Moderately severe depression, 20-27 = Severe depression    Diagnosis:    1. Major depressive disorder, recurrent episode, mild ([address]    2. Generalized anxiety disorder        Patient Active Problem List   Diagnosis    Allergic rhinitis    Hyperglycemia    Mixed hyperlipidemia    Vitamin D deficiency    Neoplasm of uncertain behavior of skin    GERD (gastroesophageal reflux disease)    Cervical disc herniation    Depression    Allergic rhinitis due to pollen    Mild single current episode of major depressive disorder (HCC)    Nonintractable headache    Palpitations    Essential hypertension    Major depressive disorder, recurrent episode, moderate with anxious distress (HCC)    Generalized anxiety disorder         Plan:  Pt interventions:  Supportive techniques, Emphasized self-care as important for managing overall health and CBT to target maladaptive thoughts, brainstormed ways to increase frequency of exercise. Pt Behavioral Change Plan:  Pt set the following goals:  1. Go for walks (short or long) to manage stress  2. Practice being mindful - paying attention to the present moment in a nonjudgmental way  3. Practice diaphragmatic breathing throughout the day  4. Complete ABC sheets to challenge negative thoughts  5. Practice progressive muscle relaxation daily  6. Try visualization exercises (e.g., taking a trip in your mind) during which you focus on what your senses would experience in that place  7. Continue using the Calm radha  8. Spend time around supportive people  9. Do light therapy in morning for 30 minutes right after waking up    Pt scheduled F/U visit in 3 weeks.

## 2019-06-21 ENCOUNTER — OFFICE VISIT (OUTPATIENT)
Dept: PSYCHOLOGY | Age: 39
End: 2019-06-21
Payer: COMMERCIAL

## 2019-06-21 DIAGNOSIS — F33.1 MAJOR DEPRESSIVE DISORDER, RECURRENT EPISODE, MODERATE (HCC): ICD-10-CM

## 2019-06-21 DIAGNOSIS — F41.1 GENERALIZED ANXIETY DISORDER: Primary | ICD-10-CM

## 2019-06-21 PROCEDURE — 90832 PSYTX W PT 30 MINUTES: CPT | Performed by: PSYCHOLOGIST

## 2019-06-21 ASSESSMENT — PATIENT HEALTH QUESTIONNAIRE - PHQ9
7. TROUBLE CONCENTRATING ON THINGS, SUCH AS READING THE NEWSPAPER OR WATCHING TELEVISION: 2
4. FEELING TIRED OR HAVING LITTLE ENERGY: 2
2. FEELING DOWN, DEPRESSED OR HOPELESS: 2
5. POOR APPETITE OR OVEREATING: 2
3. TROUBLE FALLING OR STAYING ASLEEP: 2
SUM OF ALL RESPONSES TO PHQ9 QUESTIONS 1 & 2: 4
10. IF YOU CHECKED OFF ANY PROBLEMS, HOW DIFFICULT HAVE THESE PROBLEMS MADE IT FOR YOU TO DO YOUR WORK, TAKE CARE OF THINGS AT HOME, OR GET ALONG WITH OTHER PEOPLE: 2
SUM OF ALL RESPONSES TO PHQ QUESTIONS 1-9: 16
8. MOVING OR SPEAKING SO SLOWLY THAT OTHER PEOPLE COULD HAVE NOTICED. OR THE OPPOSITE, BEING SO FIGETY OR RESTLESS THAT YOU HAVE BEEN MOVING AROUND A LOT MORE THAN USUAL: 2
9. THOUGHTS THAT YOU WOULD BE BETTER OFF DEAD, OR OF HURTING YOURSELF: 0
SUM OF ALL RESPONSES TO PHQ QUESTIONS 1-9: 16
6. FEELING BAD ABOUT YOURSELF - OR THAT YOU ARE A FAILURE OR HAVE LET YOURSELF OR YOUR FAMILY DOWN: 2
1. LITTLE INTEREST OR PLEASURE IN DOING THINGS: 2

## 2019-06-21 ASSESSMENT — ANXIETY QUESTIONNAIRES
5. BEING SO RESTLESS THAT IT IS HARD TO SIT STILL: 2-OVER HALF THE DAYS
7. FEELING AFRAID AS IF SOMETHING AWFUL MIGHT HAPPEN: 3-NEARLY EVERY DAY
GAD7 TOTAL SCORE: 18
4. TROUBLE RELAXING: 2-OVER HALF THE DAYS
3. WORRYING TOO MUCH ABOUT DIFFERENT THINGS: 3-NEARLY EVERY DAY
2. NOT BEING ABLE TO STOP OR CONTROL WORRYING: 3-NEARLY EVERY DAY
1. FEELING NERVOUS, ANXIOUS, OR ON EDGE: 3-NEARLY EVERY DAY
6. BECOMING EASILY ANNOYED OR IRRITABLE: 2-OVER HALF THE DAYS

## 2019-06-21 NOTE — PROGRESS NOTES
Behavioral Health Consultation  Yadira Gutierrez Psy.D. Psychologist  6/21/2019  11:28 AM      Time spent with Patient: 30 minutes  This is patient's fourteenth [facility] appointment. Reason for Consult:  Depression, anxiety  Referring Provider: Anderson Wakefield, NP  [address] , [address]  Current PCP: Selene Aguila MD      S:  Pt [employer]) reported that things have been rough. Completed training to care for \"Mom\" at home (e.g., clearing her trach, tube feeds). Getting the house ready in preparation for Mom's return home. Got in contact with [facility] to get a home health aide for Mom and Nicho Schulte. Will need to have 2 people available to care for Mom and Nicho Shculte at all times, including overnight checks. Pt had a panic attack when she saw the schedule. Unsure if she can handle this added stress. Knows she needs to set boundaries. Pt has had daily headaches for the past week. Feeling exhausted. Knows her mood and anxiety are worsening. Worried about ending up back in the hospital again.       O:  MSE:  Appearance: good hygiene and appropriate attire  Attitude: cooperative, friendly and moderate distress   Consciousness: alert  Orientation: oriented to person, place, time, general circumstance  Memory: recent and remote memory intact  Attention/Concentration: intact during session  Psychomotor Activity: normal  Eye Contact: normal  Speech: normal rate and volume, well-articulated   Mood: dyshporic and anxious  Affect: congruent with thought content and mood, brighter, calmer   Perception: within normal limits  Thought Content: within normal limits   Thought Process: logical, goal-directed, coherent   Insight: improving  Judgment: intact  Morbid ideation: no  Suicide Assessment: no suicidal ideation      History:    Medications:   Current Outpatient Medications   Medication Sig Dispense Refill    escitalopram (LEXAPRO) 20 MG tablet Take 1 tablet by mouth daily 90 tablet 0    metoprolol tartrate (LOPRESSOR) 50 MG tablet Take 1 tablet by mouth 2 times daily 180 tablet 0    omeprazole (PRILOSEC) 40 MG delayed release capsule Take 1 capsule by mouth daily 30 capsule 3    sucralfate (CARAFATE) 1 GM tablet Take 1 tablet by mouth 4 times daily 120 tablet 3    montelukast (SINGULAIR) 10 MG tablet Take 1 tablet by mouth daily 90 tablet 1    albuterol sulfate HFA (PROAIR HFA) 108 (90 Base) MCG/ACT inhaler Inhale 2 puffs into the lungs every 6 hours as needed for Wheezing or Shortness of Breath 3 Inhaler 1    valACYclovir (VALTREX) 1 g tablet Take 2 tablets by mouth 2 times daily 4 tablet 2    Fluticasone Propionate (FLONASE NA) by Nasal route      Cholecalciferol (VITAMIN D3) 2000 units CAPS Take 2,000 Units by mouth 2 times daily      butalbital-acetaminophen-caffeine (FIORICET, ESGIC) -40 MG per tablet Take 1 tablet by mouth every 4 hours as needed for Headaches 180 tablet 3    clotrimazole-betamethasone (LOTRISONE) 1-0.05 % cream Apply topically 2 times daily. 1 Tube 1    cetirizine (ZYRTEC) 10 MG tablet Take 10 mg by mouth daily. No current facility-administered medications for this visit.         Social History:   Social History     Socioeconomic History    Marital status:      Spouse name: Not on file    Number of children: Not on file    Years of education: Not on file    Highest education level: Not on file   Occupational History    Not on file   Social Needs    Financial resource strain: Not on file    Food insecurity:     Worry: Not on file     Inability: Not on file    Transportation needs:     Medical: Not on file     Non-medical: Not on file   Tobacco Use    Smoking status: Never Smoker    Smokeless tobacco: Never Used   Substance and Sexual Activity    Alcohol use: No     Alcohol/week: 0.0 oz     Comment: occ    Drug use: No    Sexual activity: Yes     Partners: Male   Lifestyle    Physical activity:     Days per week: Not on file     Minutes per session: Not on file    Stress: Not on file   Relationships    Social connections:     Talks on phone: Not on file     Gets together: Not on file     Attends Caodaism service: Not on file     Active member of club or organization: Not on file     Attends meetings of clubs or organizations: Not on file     Relationship status: Not on file    Intimate partner violence:     Fear of current or ex partner: Not on file     Emotionally abused: Not on file     Physically abused: Not on file     Forced sexual activity: Not on file   Other Topics Concern    Not on file   Social History Narrative    Not on file       TOBACCO:   reports that she has never smoked. She has never used smokeless tobacco.  ETOH:   reports that she does not drink alcohol. Family History:   Family History   Problem Relation Age of Onset    Diabetes Mother     High Cholesterol Mother     Cancer Father        A:  Patient's depressive and anxious symptoms have been worsening due to concerns about increasing caregiver burden. Normalized and validated patient's distress. Reinforced the importance of boundary setting so she can retain adequate time for self-care, which has been in short supply for a while. No safety concerns at this time. GARY 7 SCORE 6/21/2019 5/28/2019 5/10/2019 4/16/2019 4/2/2019 3/1/2019 2/1/2019   GARY-7 Total Score 18 11 13 13 9 6 6     Interpretation of GARY-7 score: 5-9 = mild anxiety, 10-14 = moderate anxiety, 15+ = severe anxiety. Recommend referral to behavioral health for scores 10 or greater. PHQ Scores 6/21/2019 5/28/2019 5/10/2019 4/16/2019 4/2/2019 3/1/2019 2/1/2019   PHQ2 Score 4 2 2 3 2 3 1   PHQ9 Score 16 13 12 12 4 10 4     Interpretation of Total Score Depression Severity: 1-4 = Minimal depression, 5-9 = Mild depression, 10-14 = Moderate depression, 15-19 = Moderately severe depression, 20-27 = Severe depression    Diagnosis:    1. Generalized anxiety disorder    2.  Major depressive disorder,

## 2019-07-09 ENCOUNTER — OFFICE VISIT (OUTPATIENT)
Dept: PSYCHOLOGY | Age: 39
End: 2019-07-09
Payer: COMMERCIAL

## 2019-07-09 DIAGNOSIS — F41.1 GENERALIZED ANXIETY DISORDER: Primary | ICD-10-CM

## 2019-07-09 DIAGNOSIS — F33.1 MAJOR DEPRESSIVE DISORDER, RECURRENT EPISODE, MODERATE (HCC): ICD-10-CM

## 2019-07-09 PROCEDURE — 90832 PSYTX W PT 30 MINUTES: CPT | Performed by: PSYCHOLOGIST

## 2019-07-09 ASSESSMENT — PATIENT HEALTH QUESTIONNAIRE - PHQ9
9. THOUGHTS THAT YOU WOULD BE BETTER OFF DEAD, OR OF HURTING YOURSELF: 0
2. FEELING DOWN, DEPRESSED OR HOPELESS: 1
10. IF YOU CHECKED OFF ANY PROBLEMS, HOW DIFFICULT HAVE THESE PROBLEMS MADE IT FOR YOU TO DO YOUR WORK, TAKE CARE OF THINGS AT HOME, OR GET ALONG WITH OTHER PEOPLE: 2
1. LITTLE INTEREST OR PLEASURE IN DOING THINGS: 2
7. TROUBLE CONCENTRATING ON THINGS, SUCH AS READING THE NEWSPAPER OR WATCHING TELEVISION: 2
SUM OF ALL RESPONSES TO PHQ9 QUESTIONS 1 & 2: 3
4. FEELING TIRED OR HAVING LITTLE ENERGY: 2
3. TROUBLE FALLING OR STAYING ASLEEP: 2
8. MOVING OR SPEAKING SO SLOWLY THAT OTHER PEOPLE COULD HAVE NOTICED. OR THE OPPOSITE, BEING SO FIGETY OR RESTLESS THAT YOU HAVE BEEN MOVING AROUND A LOT MORE THAN USUAL: 1
SUM OF ALL RESPONSES TO PHQ QUESTIONS 1-9: 13
6. FEELING BAD ABOUT YOURSELF - OR THAT YOU ARE A FAILURE OR HAVE LET YOURSELF OR YOUR FAMILY DOWN: 1
5. POOR APPETITE OR OVEREATING: 2
SUM OF ALL RESPONSES TO PHQ QUESTIONS 1-9: 13

## 2019-07-09 ASSESSMENT — ANXIETY QUESTIONNAIRES
6. BECOMING EASILY ANNOYED OR IRRITABLE: 1-SEVERAL DAYS
4. TROUBLE RELAXING: 2-OVER HALF THE DAYS
3. WORRYING TOO MUCH ABOUT DIFFERENT THINGS: 2-OVER HALF THE DAYS
5. BEING SO RESTLESS THAT IT IS HARD TO SIT STILL: 1-SEVERAL DAYS
GAD7 TOTAL SCORE: 11
7. FEELING AFRAID AS IF SOMETHING AWFUL MIGHT HAPPEN: 1-SEVERAL DAYS
2. NOT BEING ABLE TO STOP OR CONTROL WORRYING: 2-OVER HALF THE DAYS
1. FEELING NERVOUS, ANXIOUS, OR ON EDGE: 2-OVER HALF THE DAYS

## 2019-07-09 NOTE — PROGRESS NOTES
Behavioral Health Consultation  Claus Roberts Psy.D. Psychologist  7/9/2019  12:02 PM      Time spent with Patient: 30 minutes  This is patient's fifteenth [facility] appointment. Reason for Consult:  Depression, anxiety  Referring Provider: Rena Ocampo NP  [address] [facility], [address]  Current PCP: Marycarmen Reynoso MD      S:  Pt Randolph Lemos) reported that she's exhausted from her recent schedule. Adjusting to caring for Mom and Kennth Kittson at home any time she is not workingn. Feeling like it's too much. Discussed a recent argument with Mom and Peterson's daughters. Psychiatrist Dr. Ralph Moraes won't fill out FMLA paperwork for pt. Told pt she can take her BP medication to calm anxiety.  Pt does not plan to continue following up with her psychiatrist, would prefer to just see her PCP from now on.      O:  MSE:  Appearance: good hygiene and appropriate attire  Attitude: cooperative, friendly and moderate distress   Consciousness: alert  Orientation: oriented to person, place, time, general circumstance  Memory: recent and remote memory intact  Attention/Concentration: intact during session  Psychomotor Activity: normal  Eye Contact: normal  Speech: normal rate and volume, well-articulated   Mood: dyshporic and anxious  Affect: congruent with thought content and mood, brighter, calmer   Perception: within normal limits  Thought Content: within normal limits   Thought Process: logical, goal-directed, coherent   Insight: improving  Judgment: intact  Morbid ideation: no  Suicide Assessment: no suicidal ideation      History:    Medications:   Current Outpatient Medications   Medication Sig Dispense Refill    escitalopram (LEXAPRO) 20 MG tablet Take 1 tablet by mouth daily 90 tablet 0    metoprolol tartrate (LOPRESSOR) 50 MG tablet Take 1 tablet by mouth 2 times daily 180 tablet 0    omeprazole (PRILOSEC) 40 MG delayed release capsule Take 1 capsule by mouth daily 30 capsule 3    sucralfate (CARAFATE) 1 GM tablet Take 1 tablet by mouth 4 times daily 120 tablet 3    montelukast (SINGULAIR) 10 MG tablet Take 1 tablet by mouth daily 90 tablet 1    albuterol sulfate HFA (PROAIR HFA) 108 (90 Base) MCG/ACT inhaler Inhale 2 puffs into the lungs every 6 hours as needed for Wheezing or Shortness of Breath 3 Inhaler 1    valACYclovir (VALTREX) 1 g tablet Take 2 tablets by mouth 2 times daily 4 tablet 2    Fluticasone Propionate (FLONASE NA) by Nasal route      Cholecalciferol (VITAMIN D3) 2000 units CAPS Take 2,000 Units by mouth 2 times daily      butalbital-acetaminophen-caffeine (FIORICET, ESGIC) -40 MG per tablet Take 1 tablet by mouth every 4 hours as needed for Headaches 180 tablet 3    clotrimazole-betamethasone (LOTRISONE) 1-0.05 % cream Apply topically 2 times daily. 1 Tube 1    cetirizine (ZYRTEC) 10 MG tablet Take 10 mg by mouth daily. No current facility-administered medications for this visit.         Social History:   Social History     Socioeconomic History    Marital status:      Spouse name: Not on file    Number of children: Not on file    Years of education: Not on file    Highest education level: Not on file   Occupational History    Not on file   Social Needs    Financial resource strain: Not on file    Food insecurity:     Worry: Not on file     Inability: Not on file    Transportation needs:     Medical: Not on file     Non-medical: Not on file   Tobacco Use    Smoking status: Never Smoker    Smokeless tobacco: Never Used   Substance and Sexual Activity    Alcohol use: No     Alcohol/week: 0.0 oz     Comment: occ    Drug use: No    Sexual activity: Yes     Partners: Male   Lifestyle    Physical activity:     Days per week: Not on file     Minutes per session: Not on file    Stress: Not on file   Relationships    Social connections:     Talks on phone: Not on file     Gets together: Not on file     Attends Yarsanism service: Not on file     Active member of club or

## 2019-07-23 ENCOUNTER — OFFICE VISIT (OUTPATIENT)
Dept: PSYCHOLOGY | Age: 39
End: 2019-07-23
Payer: COMMERCIAL

## 2019-07-23 DIAGNOSIS — F33.1 MAJOR DEPRESSIVE DISORDER, RECURRENT EPISODE, MODERATE (HCC): ICD-10-CM

## 2019-07-23 DIAGNOSIS — F41.1 GENERALIZED ANXIETY DISORDER: Primary | ICD-10-CM

## 2019-07-23 PROCEDURE — 90832 PSYTX W PT 30 MINUTES: CPT | Performed by: PSYCHOLOGIST

## 2019-07-23 ASSESSMENT — PATIENT HEALTH QUESTIONNAIRE - PHQ9
SUM OF ALL RESPONSES TO PHQ QUESTIONS 1-9: 11
6. FEELING BAD ABOUT YOURSELF - OR THAT YOU ARE A FAILURE OR HAVE LET YOURSELF OR YOUR FAMILY DOWN: 1
1. LITTLE INTEREST OR PLEASURE IN DOING THINGS: 1
4. FEELING TIRED OR HAVING LITTLE ENERGY: 1
2. FEELING DOWN, DEPRESSED OR HOPELESS: 1
5. POOR APPETITE OR OVEREATING: 2
10. IF YOU CHECKED OFF ANY PROBLEMS, HOW DIFFICULT HAVE THESE PROBLEMS MADE IT FOR YOU TO DO YOUR WORK, TAKE CARE OF THINGS AT HOME, OR GET ALONG WITH OTHER PEOPLE: 1
9. THOUGHTS THAT YOU WOULD BE BETTER OFF DEAD, OR OF HURTING YOURSELF: 0
3. TROUBLE FALLING OR STAYING ASLEEP: 1
8. MOVING OR SPEAKING SO SLOWLY THAT OTHER PEOPLE COULD HAVE NOTICED. OR THE OPPOSITE, BEING SO FIGETY OR RESTLESS THAT YOU HAVE BEEN MOVING AROUND A LOT MORE THAN USUAL: 2
7. TROUBLE CONCENTRATING ON THINGS, SUCH AS READING THE NEWSPAPER OR WATCHING TELEVISION: 2
SUM OF ALL RESPONSES TO PHQ QUESTIONS 1-9: 11
SUM OF ALL RESPONSES TO PHQ9 QUESTIONS 1 & 2: 2

## 2019-07-23 ASSESSMENT — ANXIETY QUESTIONNAIRES
GAD7 TOTAL SCORE: 9
1. FEELING NERVOUS, ANXIOUS, OR ON EDGE: 1-SEVERAL DAYS
5. BEING SO RESTLESS THAT IT IS HARD TO SIT STILL: 1-SEVERAL DAYS
4. TROUBLE RELAXING: 1-SEVERAL DAYS
7. FEELING AFRAID AS IF SOMETHING AWFUL MIGHT HAPPEN: 1-SEVERAL DAYS
6. BECOMING EASILY ANNOYED OR IRRITABLE: 1-SEVERAL DAYS
2. NOT BEING ABLE TO STOP OR CONTROL WORRYING: 2-OVER HALF THE DAYS
3. WORRYING TOO MUCH ABOUT DIFFERENT THINGS: 2-OVER HALF THE DAYS

## 2019-07-23 NOTE — PROGRESS NOTES
Behavioral Health Consultation  Pinky Herrera Psy.D. Psychologist  7/23/2019  11:59 AM      Time spent with Patient: 30 minutes  This is patient's sixteenth [facility] appointment. Reason for Consult:  Depression, anxiety  Referring Provider: Pau Isaacs NP  [address] [place], [address]  Current PCP: Stephanie Chacko MD      S:  Pt [place]) reported that she's been doing alright. Two of the girls were home last week and did more of the caregiving so pt had more time for herself. Pt felt overwhelmed one day, so she set a boundary to limit her time spent caring for Mom alone. She is noticing Mom making progress. Pt is thinking about applying for a job as a \"patient experience manager\" for [employer], even though it's outside her comfort zone.         O:  MSE:  Appearance: good hygiene and appropriate attire  Attitude: cooperative, friendly and mild distress   Consciousness: alert  Orientation: oriented to person, place, time, general circumstance  Memory: recent and remote memory intact  Attention/Concentration: intact during session  Psychomotor Activity: normal  Eye Contact: normal  Speech: normal rate and volume, well-articulated   Mood: dyshporic and anxious  Affect: congruent with thought content and mood, brighter, calmer   Perception: within normal limits  Thought Content: within normal limits   Thought Process: logical, goal-directed, coherent   Insight: improving  Judgment: intact  Morbid ideation: no  Suicide Assessment: no suicidal ideation      History:    Medications:   Current Outpatient Medications   Medication Sig Dispense Refill    escitalopram (LEXAPRO) 20 MG tablet Take 1 tablet by mouth daily 90 tablet 0    metoprolol tartrate (LOPRESSOR) 50 MG tablet Take 1 tablet by mouth 2 times daily 180 tablet 0    omeprazole (PRILOSEC) 40 MG delayed release capsule Take 1 capsule by mouth daily 30 capsule 3    sucralfate (CARAFATE) 1 GM tablet Take 1 tablet by mouth 4

## 2020-11-25 ENCOUNTER — HOSPITAL ENCOUNTER (EMERGENCY)
Age: 40
Discharge: HOME | End: 2020-11-25
Attending: EMERGENCY MEDICINE
Payer: COMMERCIAL

## 2020-11-25 VITALS
HEIGHT: 66 IN | DIASTOLIC BLOOD PRESSURE: 88 MMHG | HEART RATE: 68 BPM | WEIGHT: 280 LBS | BODY MASS INDEX: 45 KG/M2 | OXYGEN SATURATION: 99 % | SYSTOLIC BLOOD PRESSURE: 174 MMHG | RESPIRATION RATE: 18 BRPM | TEMPERATURE: 98 F

## 2020-11-25 PROCEDURE — 99283 EMERGENCY DEPT VISIT LOW MDM: CPT

## 2020-11-25 PROCEDURE — 16020 DRESS/DEBRID P-THICK BURN S: CPT

## 2020-11-25 PROCEDURE — 6370000000 HC RX 637 (ALT 250 FOR IP): Performed by: EMERGENCY MEDICINE

## 2020-11-25 RX ORDER — TRAZODONE HYDROCHLORIDE 50 MG/1
50 TABLET ORAL NIGHTLY
COMMUNITY
End: 2021-05-17 | Stop reason: SDUPTHER

## 2020-11-25 RX ORDER — BACITRACIN ZINC AND POLYMYXIN B SULFATE 500; 1000 [USP'U]/G; [USP'U]/G
OINTMENT TOPICAL ONCE
Status: COMPLETED | OUTPATIENT
Start: 2020-11-25 | End: 2020-11-25

## 2020-11-25 RX ORDER — OXYCODONE HYDROCHLORIDE AND ACETAMINOPHEN 5; 325 MG/1; MG/1
2 TABLET ORAL ONCE
Status: COMPLETED | OUTPATIENT
Start: 2020-11-25 | End: 2020-11-25

## 2020-11-25 RX ORDER — HYDROCODONE BITARTRATE AND ACETAMINOPHEN 5; 325 MG/1; MG/1
1 TABLET ORAL EVERY 6 HOURS PRN
Qty: 15 TABLET | Refills: 0 | Status: SHIPPED | OUTPATIENT
Start: 2020-11-25 | End: 2020-11-28

## 2020-11-25 RX ADMIN — BACITRACIN ZINC AND POLYMYXIN B SULFATE: 500; 10000 OINTMENT TOPICAL at 19:09

## 2020-11-25 RX ADMIN — OXYCODONE AND ACETAMINOPHEN 2 TABLET: 5; 325 TABLET ORAL at 17:15

## 2020-11-25 ASSESSMENT — PAIN DESCRIPTION - LOCATION: LOCATION: HAND

## 2020-11-25 ASSESSMENT — PAIN DESCRIPTION - ORIENTATION: ORIENTATION: RIGHT

## 2020-11-25 ASSESSMENT — PAIN DESCRIPTION - DESCRIPTORS: DESCRIPTORS: BURNING

## 2020-11-25 ASSESSMENT — PAIN DESCRIPTION - PAIN TYPE: TYPE: ACUTE PAIN

## 2020-11-25 ASSESSMENT — PAIN SCALES - GENERAL
PAINLEVEL_OUTOF10: 10
PAINLEVEL_OUTOF10: 9

## 2020-11-25 ASSESSMENT — PAIN DESCRIPTION - FREQUENCY: FREQUENCY: CONTINUOUS
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
## 2021-04-15 PROBLEM — R00.2 PALPITATIONS: Status: RESOLVED | Noted: 2017-11-01 | Resolved: 2021-04-15

## 2021-04-15 PROBLEM — R51.9 NONINTRACTABLE HEADACHE: Status: RESOLVED | Noted: 2017-11-01 | Resolved: 2021-04-15

## 2021-05-03 ENCOUNTER — OFFICE VISIT (OUTPATIENT)
Dept: GYNECOLOGY | Age: 41
End: 2021-05-03
Payer: COMMERCIAL

## 2021-05-03 VITALS
DIASTOLIC BLOOD PRESSURE: 85 MMHG | SYSTOLIC BLOOD PRESSURE: 150 MMHG | RESPIRATION RATE: 16 BRPM | OXYGEN SATURATION: 95 % | WEIGHT: 285 LBS | HEIGHT: 66 IN | HEART RATE: 76 BPM | BODY MASS INDEX: 45.8 KG/M2

## 2021-05-03 DIAGNOSIS — R23.2 HOT FLASHES: Primary | ICD-10-CM

## 2021-05-03 DIAGNOSIS — N94.3 PMS (PREMENSTRUAL SYNDROME): ICD-10-CM

## 2021-05-03 LAB
CONTROL: NORMAL
PREGNANCY TEST URINE, POC: NEGATIVE

## 2021-05-03 PROCEDURE — 99203 OFFICE O/P NEW LOW 30 MIN: CPT | Performed by: OBSTETRICS & GYNECOLOGY

## 2021-05-03 PROCEDURE — 81025 URINE PREGNANCY TEST: CPT | Performed by: OBSTETRICS & GYNECOLOGY

## 2021-05-03 RX ORDER — FAMOTIDINE 20 MG/1
20 TABLET, FILM COATED ORAL 2 TIMES DAILY
COMMUNITY
End: 2021-05-17 | Stop reason: SDUPTHER

## 2021-05-03 RX ORDER — TRAZODONE HYDROCHLORIDE 50 MG/1
50 TABLET ORAL
COMMUNITY
End: 2021-05-03

## 2021-05-03 RX ORDER — SERTRALINE HYDROCHLORIDE 100 MG/1
100 TABLET, FILM COATED ORAL
COMMUNITY
Start: 2018-09-04 | End: 2021-05-17 | Stop reason: SDUPTHER

## 2021-05-03 ASSESSMENT — ENCOUNTER SYMPTOMS
BLOOD IN STOOL: 0
PHOTOPHOBIA: 0
RECTAL PAIN: 0
WHEEZING: 0
ABDOMINAL PAIN: 0
BACK PAIN: 0
TROUBLE SWALLOWING: 0
APNEA: 0
COUGH: 0
DIARRHEA: 0
ABDOMINAL DISTENTION: 0
SORE THROAT: 0
COLOR CHANGE: 0
NAUSEA: 0
CHEST TIGHTNESS: 0
SHORTNESS OF BREATH: 0
CONSTIPATION: 0
ANAL BLEEDING: 0
VOMITING: 0

## 2021-05-03 NOTE — PROGRESS NOTES
Annual GYN Visit    Albert Marquez  Date ofBirth:  1980    Date of Service:  5/3/2021    Chief Complaint:   Albert Marquez is a 41 y.o.  female who presents for mood changes and irritability prior to onset of menses and hot flashes x 1 year     HPI:  Patient is premenopausal- Patient's last menstrual period was 2021 (exact date). .  She reports mostly regular menses with normal flow and duration, reports irritability one week prior to onset of menses x 1 years, also has daily hot flashes. She has history of depression, no suicidal or homicidal ideation.      Health Maintenance   Topic Date Due    Hepatitis C screen  Never done    Cervical cancer screen  2018    Potassium monitoring  2019    Creatinine monitoring  2019    Diabetes screen  2021    Lipid screen  2023    DTaP/Tdap/Td vaccine (2 - Td) 2024    Flu vaccine  Completed    COVID-19 Vaccine  Completed    HIV screen  Completed    Hepatitis A vaccine  Aged Out    Hepatitis B vaccine  Aged Out    Hib vaccine  Aged Out    Meningococcal (ACWY) vaccine  Aged Out    Pneumococcal 0-64 years Vaccine  Aged Out       Past Medical History:   Diagnosis Date    Allergic rhinitis     Asthma     Depression 2016    GERD (gastroesophageal reflux disease)     Hypertension 2017    Major depressive disorder, recurrent episode, moderate with anxious distress ([address] 2017    Pyloric stenosis      Past Surgical History:   Procedure Laterality Date    WISDOM TOOTH EXTRACTION  early     x 4     OB History    Para Term  AB Living   1 1       1   SAB TAB Ectopic Molar Multiple Live Births             1      # Outcome Date GA Lbr Jarrod/2nd Weight Sex Delivery Anes PTL Lv   1 Para    6 lb 8 oz (2.948 kg) M Vag-Spont EPI N LESLEY     Social History     Socioeconomic History    Marital status:      Spouse name: Not on file    Number of children: Not on file    Years of education: Not on file    Highest education level: Not on file   Occupational History    Not on file   Social Needs    Financial resource strain: Not on file    Food insecurity     Worry: Not on file     Inability: Not on file    Transportation needs     Medical: Not on file     Non-medical: Not on file   Tobacco Use    Smoking status: Never Smoker    Smokeless tobacco: Never Used   Substance and Sexual Activity    Alcohol use: No     Alcohol/week: 0.0 standard drinks     Comment: occ    Drug use: No    Sexual activity: Yes     Partners: Male   Lifestyle    Physical activity     Days per week: Not on file     Minutes per session: Not on file    Stress: Not on file   Relationships    Social connections     Talks on phone: Not on file     Gets together: Not on file     Attends Cheondoism service: Not on file     Active member of club or organization: Not on file     Attends meetings of clubs or organizations: Not on file     Relationship status: Not on file    Intimate partner violence     Fear of current or ex partner: Not on file     Emotionally abused: Not on file     Physically abused: Not on file     Forced sexual activity: Not on file   Other Topics Concern    Not on file   Social History Narrative    Not on file     Allergies   Allergen Reactions    Azithromycin      Rash, itching, headache    Desvenlafaxine      Suicidal ideation    Fexofenadine-Pseudoephed Er      The decongestant part    Penicillins     Sulfa Antibiotics      Outpatient Medications Marked as Taking for the 5/3/21 encounter (Office Visit) with Anant Braga MD   Medication Sig Dispense Refill    sertraline (ZOLOFT) 100 MG tablet Take 100 mg by mouth      famotidine (PEPCID) 20 MG tablet Take 20 mg by mouth 2 times daily      traZODone (DESYREL) 50 MG tablet Take 50 mg by mouth nightly      metoprolol tartrate (LOPRESSOR) 50 MG tablet Take 1 tablet by mouth 2 times daily 180 tablet 0    omeprazole (PRILOSEC) 40 MG delayed release capsule Take 1 capsule by mouth daily 30 capsule 3    montelukast (SINGULAIR) 10 MG tablet Take 1 tablet by mouth daily 90 tablet 1    albuterol sulfate HFA (PROAIR HFA) 108 (90 Base) MCG/ACT inhaler Inhale 2 puffs into the lungs every 6 hours as needed for Wheezing or Shortness of Breath 3 Inhaler 1    valACYclovir (VALTREX) 1 g tablet Take 2 tablets by mouth 2 times daily 4 tablet 2    Cholecalciferol (VITAMIN D3) 2000 units CAPS Take 2,000 Units by mouth 2 times daily      butalbital-acetaminophen-caffeine (FIORICET, ESGIC) -40 MG per tablet Take 1 tablet by mouth every 4 hours as needed for Headaches 180 tablet 3    clotrimazole-betamethasone (LOTRISONE) 1-0.05 % cream Apply topically 2 times daily. 1 Tube 1    cetirizine (ZYRTEC) 10 MG tablet Take 10 mg by mouth daily. Family History   Problem Relation Age of Onset    Diabetes Mother     High Cholesterol Mother     Cancer Father          Review of Systems:  Review of Systems   Constitutional: Negative for appetite change, chills, fatigue, fever and unexpected weight change. HENT: Negative for ear pain, hearing loss, mouth sores, sore throat and trouble swallowing. Eyes: Negative for photophobia and visual disturbance. Respiratory: Negative for apnea, cough, chest tightness, shortness of breath and wheezing. Cardiovascular: Negative for chest pain, palpitations and leg swelling. Gastrointestinal: Negative for abdominal distention, abdominal pain, anal bleeding, blood in stool, constipation, diarrhea, nausea, rectal pain and vomiting. Endocrine: Negative for cold intolerance, heat intolerance, polydipsia, polyphagia and polyuria. Genitourinary: Negative for difficulty urinating, dyspareunia, dysuria, enuresis, frequency, genital sores, hematuria, menstrual problem, pelvic pain, urgency, vaginal bleeding, vaginal discharge and vaginal pain.    Musculoskeletal: Negative for arthralgias, back pain, joint Uterus: Not deviated, not enlarged, not fixed and not tender. Adnexa:         Right: No mass, tenderness or fullness. Left: No mass, tenderness or fullness. Rectum: No mass or external hemorrhoid. Skin:     General: Skin is warm. Coloration: Skin is not pale. Findings: No erythema or rash. Neurological:      Mental Status: She is alert. Psychiatric:         Behavior: Behavior normal. Behavior is cooperative. Thought Content: Thought content normal.         Judgment: Judgment normal.           Assessment/Plan:  1. Hot flashes  Based on symptoms I explained to patient that she is probably perimenopausal. Discussed all treatment options and I recommended low dose oral contraception but she declined this (had mood changes previously on oral contraception  - FOLLICLE STIMULATING HORMONE; Future  - TSH with Reflex; Future    2. PMS (premenstrual syndrome)  Oral contraception vs increased dose of current SSRI to help with symptoms discussed, she declined any treatment at this time  Regular exercise and healthy lifestyle discussed     Approximately 31  minutes spent in counseling and coordination of care with over 50% in direct face to face counseling.    Return if symptoms worsen or fail to improve, for ANNUAL EXAM.

## 2021-05-13 PROBLEM — E66.813 CLASS 3 SEVERE OBESITY DUE TO EXCESS CALORIES WITH SERIOUS COMORBIDITY AND BODY MASS INDEX (BMI) OF 45.0 TO 49.9 IN ADULT: Status: ACTIVE | Noted: 2021-05-13

## 2021-05-13 PROBLEM — J45.30 MILD PERSISTENT ASTHMA WITHOUT COMPLICATION: Status: ACTIVE | Noted: 2021-02-24

## 2021-05-13 PROBLEM — E66.01 CLASS 3 SEVERE OBESITY DUE TO EXCESS CALORIES WITH SERIOUS COMORBIDITY AND BODY MASS INDEX (BMI) OF 45.0 TO 49.9 IN ADULT (HCC): Status: ACTIVE | Noted: 2021-05-13

## 2021-05-17 ENCOUNTER — OFFICE VISIT (OUTPATIENT)
Dept: PRIMARY CARE CLINIC | Age: 41
End: 2021-05-17
Payer: COMMERCIAL

## 2021-05-17 VITALS
SYSTOLIC BLOOD PRESSURE: 126 MMHG | HEART RATE: 99 BPM | BODY MASS INDEX: 44.95 KG/M2 | WEIGHT: 286.4 LBS | HEIGHT: 67 IN | DIASTOLIC BLOOD PRESSURE: 78 MMHG

## 2021-05-17 DIAGNOSIS — J45.30 MILD PERSISTENT ASTHMA WITHOUT COMPLICATION: ICD-10-CM

## 2021-05-17 DIAGNOSIS — Z13.1 SCREENING FOR DIABETES MELLITUS: ICD-10-CM

## 2021-05-17 DIAGNOSIS — Z11.4 SCREENING FOR HIV (HUMAN IMMUNODEFICIENCY VIRUS): ICD-10-CM

## 2021-05-17 DIAGNOSIS — I10 ESSENTIAL HYPERTENSION: ICD-10-CM

## 2021-05-17 DIAGNOSIS — Z76.89 ENCOUNTER TO ESTABLISH CARE WITH NEW DOCTOR: Primary | ICD-10-CM

## 2021-05-17 DIAGNOSIS — Z12.31 BREAST CANCER SCREENING BY MAMMOGRAM: ICD-10-CM

## 2021-05-17 DIAGNOSIS — F41.1 GENERALIZED ANXIETY DISORDER: ICD-10-CM

## 2021-05-17 DIAGNOSIS — Z11.59 ENCOUNTER FOR HEPATITIS C SCREENING TEST FOR LOW RISK PATIENT: ICD-10-CM

## 2021-05-17 DIAGNOSIS — E78.2 MIXED HYPERLIPIDEMIA: ICD-10-CM

## 2021-05-17 DIAGNOSIS — M50.122 CERVICAL DISC DISORDER AT C5-C6 LEVEL WITH RADICULOPATHY: ICD-10-CM

## 2021-05-17 DIAGNOSIS — Z12.4 SCREENING FOR CERVICAL CANCER: ICD-10-CM

## 2021-05-17 DIAGNOSIS — E66.01 CLASS 3 SEVERE OBESITY DUE TO EXCESS CALORIES WITH SERIOUS COMORBIDITY AND BODY MASS INDEX (BMI) OF 45.0 TO 49.9 IN ADULT (HCC): ICD-10-CM

## 2021-05-17 DIAGNOSIS — F32.0 MILD SINGLE CURRENT EPISODE OF MAJOR DEPRESSIVE DISORDER (HCC): ICD-10-CM

## 2021-05-17 PROCEDURE — 99205 OFFICE O/P NEW HI 60 MIN: CPT | Performed by: STUDENT IN AN ORGANIZED HEALTH CARE EDUCATION/TRAINING PROGRAM

## 2021-05-17 RX ORDER — FLUTICASONE PROPIONATE 44 UG/1
2 AEROSOL, METERED RESPIRATORY (INHALATION) 2 TIMES DAILY
COMMUNITY
Start: 2021-01-07 | End: 2021-05-17 | Stop reason: SDUPTHER

## 2021-05-17 RX ORDER — SERTRALINE HYDROCHLORIDE 100 MG/1
100 TABLET, FILM COATED ORAL DAILY
COMMUNITY
Start: 2021-01-07 | End: 2021-05-17

## 2021-05-17 RX ORDER — ALBUTEROL SULFATE 90 UG/1
2 AEROSOL, METERED RESPIRATORY (INHALATION) EVERY 6 HOURS PRN
Qty: 3 INHALER | Refills: 1 | Status: SHIPPED | OUTPATIENT
Start: 2021-05-17 | End: 2021-11-01 | Stop reason: SDUPTHER

## 2021-05-17 RX ORDER — FLUTICASONE PROPIONATE 44 UG/1
2 AEROSOL, METERED RESPIRATORY (INHALATION) 2 TIMES DAILY
Qty: 1 INHALER | Refills: 5 | Status: SHIPPED | OUTPATIENT
Start: 2021-05-17 | End: 2022-02-17 | Stop reason: SDUPTHER

## 2021-05-17 RX ORDER — TRAZODONE HYDROCHLORIDE 50 MG/1
TABLET ORAL
COMMUNITY
Start: 2021-04-05 | End: 2021-05-17

## 2021-05-17 RX ORDER — FAMOTIDINE 20 MG/1
20 TABLET, FILM COATED ORAL 2 TIMES DAILY
COMMUNITY
Start: 2021-01-07 | End: 2021-05-17

## 2021-05-17 RX ORDER — TRAZODONE HYDROCHLORIDE 50 MG/1
50 TABLET ORAL NIGHTLY
Qty: 90 TABLET | Refills: 3 | Status: SHIPPED | OUTPATIENT
Start: 2021-05-17 | End: 2021-10-12 | Stop reason: SDUPTHER

## 2021-05-17 RX ORDER — OMEPRAZOLE 20 MG/1
20 CAPSULE, DELAYED RELEASE ORAL DAILY
COMMUNITY
Start: 2021-01-07 | End: 2021-05-17

## 2021-05-17 RX ORDER — SERTRALINE HYDROCHLORIDE 100 MG/1
100 TABLET, FILM COATED ORAL DAILY
Qty: 90 TABLET | Refills: 3 | Status: SHIPPED | OUTPATIENT
Start: 2021-05-17 | End: 2022-02-17 | Stop reason: SDUPTHER

## 2021-05-17 RX ORDER — ALBUTEROL SULFATE 90 UG/1
1-2 AEROSOL, METERED RESPIRATORY (INHALATION) EVERY 4 HOURS PRN
COMMUNITY
Start: 2021-01-07 | End: 2021-05-17

## 2021-05-17 RX ORDER — FAMOTIDINE 20 MG/1
20 TABLET, FILM COATED ORAL 2 TIMES DAILY
Qty: 60 TABLET | Refills: 5 | Status: SHIPPED | OUTPATIENT
Start: 2021-05-17 | End: 2021-10-12 | Stop reason: SDUPTHER

## 2021-05-17 RX ORDER — CYCLOBENZAPRINE HCL 5 MG
5 TABLET ORAL EVERY 8 HOURS PRN
COMMUNITY
Start: 2021-01-07 | End: 2021-06-03 | Stop reason: SDUPTHER

## 2021-05-17 RX ORDER — MONTELUKAST SODIUM 10 MG/1
10 TABLET ORAL NIGHTLY
Qty: 90 TABLET | Refills: 1 | Status: SHIPPED | OUTPATIENT
Start: 2021-05-17 | End: 2021-06-03

## 2021-05-17 RX ORDER — METOPROLOL TARTRATE 50 MG/1
50 TABLET, FILM COATED ORAL 2 TIMES DAILY
Qty: 180 TABLET | Refills: 0 | Status: SHIPPED | OUTPATIENT
Start: 2021-05-17 | End: 2021-06-25 | Stop reason: SDUPTHER

## 2021-05-17 RX ORDER — METOPROLOL TARTRATE 50 MG/1
TABLET, FILM COATED ORAL
COMMUNITY
Start: 2021-04-05 | End: 2021-05-17

## 2021-05-17 RX ORDER — MONTELUKAST SODIUM 10 MG/1
10 TABLET ORAL DAILY
COMMUNITY
Start: 2021-01-07 | End: 2021-05-17 | Stop reason: SDUPTHER

## 2021-05-17 RX ORDER — MONTELUKAST SODIUM 10 MG/1
10 TABLET ORAL NIGHTLY
Qty: 90 TABLET | Refills: 3 | Status: SHIPPED | OUTPATIENT
Start: 2021-05-17 | End: 2021-08-13 | Stop reason: ALTCHOICE

## 2021-05-17 RX ORDER — GABAPENTIN 100 MG/1
100 CAPSULE ORAL 3 TIMES DAILY
Qty: 270 CAPSULE | Refills: 1 | Status: SHIPPED | OUTPATIENT
Start: 2021-05-17 | End: 2021-08-13

## 2021-05-17 SDOH — ECONOMIC STABILITY: FOOD INSECURITY: WITHIN THE PAST 12 MONTHS, THE FOOD YOU BOUGHT JUST DIDN'T LAST AND YOU DIDN'T HAVE MONEY TO GET MORE.: NEVER TRUE

## 2021-05-17 SDOH — ECONOMIC STABILITY: FOOD INSECURITY: WITHIN THE PAST 12 MONTHS, YOU WORRIED THAT YOUR FOOD WOULD RUN OUT BEFORE YOU GOT MONEY TO BUY MORE.: NEVER TRUE

## 2021-05-17 ASSESSMENT — ENCOUNTER SYMPTOMS
BACK PAIN: 0
CONSTIPATION: 0
BLOOD IN STOOL: 0
STRIDOR: 0
ABDOMINAL PAIN: 0
DIARRHEA: 0
CHEST TIGHTNESS: 0
SHORTNESS OF BREATH: 0

## 2021-05-17 ASSESSMENT — SOCIAL DETERMINANTS OF HEALTH (SDOH): HOW HARD IS IT FOR YOU TO PAY FOR THE VERY BASICS LIKE FOOD, HOUSING, MEDICAL CARE, AND HEATING?: NOT HARD AT ALL

## 2021-05-17 NOTE — PATIENT INSTRUCTIONS
Patient Education        Learning About the Mediterranean Diet  What is the [address]? The Mediterranean diet is a style of eating rather than a diet plan. It features foods eaten in [place], [place], [place] and [place], and other countries along the . It emphasizes eating foods like fish, fruits, vegetables, beans, high-fiber breads and whole grains, nuts, and olive oil. This style of eating includes limited red meat, cheese, and sweets. Why choose the Mediterranean diet? A Mediterranean-style diet may improve heart health. It contains more fat than other heart-healthy diets. But the fats are mainly from nuts, unsaturated oils (such as fish oils and olive oil), and certain nut or seed oils (such as canola, soybean, or flaxseed oil). These fats may help protect the heart and blood vessels. How can you get started on the Mediterranean diet? Here are some things you can do to switch to a more Mediterranean way of eating. What to eat  · Eat a variety of fruits and vegetables each day, such as grapes, blueberries, tomatoes, broccoli, peppers, figs, olives, spinach, eggplant, beans, lentils, and chickpeas. · Eat a variety of whole-grain foods each day, such as oats, brown rice, and whole wheat bread, pasta, and couscous. · Eat fish at least 2 times a week. Try tuna, salmon, mackerel, lake trout, herring, or sardines. · Eat moderate amounts of low-fat dairy products, such as milk, cheese, or yogurt. · Eat moderate amounts of poultry and eggs. · Choose healthy (unsaturated) fats, such as nuts, olive oil, and certain nut or seed oils like canola, soybean, and flaxseed. · Limit unhealthy (saturated) fats, such as butter, palm oil, and coconut oil. And limit fats found in animal products, such as meat and dairy products made with whole milk. Try to eat red meat only a few times a month in very small amounts. · Limit sweets and desserts to only a few times a week.  This includes sugar-sweetened drinks like soda. The Mediterranean diet may also include red wine with your meal--1 glass each day for women and up to 2 glasses a day for men. Tips for eating at home  · Use herbs, spices, garlic, lemon zest, and citrus juice instead of salt to add flavor to foods. · Add avocado slices to your sandwich instead of duron. · Have fish for lunch or dinner instead of red meat. Brush the fish with olive oil, and broil or grill it. · Sprinkle your salad with seeds or nuts instead of cheese. · Cook with olive or canola oil instead of butter or oils that are high in saturated fat. · Switch from 2% milk or whole milk to 1% or fat-free milk. · Dip raw vegetables in a vinaigrette dressing or hummus instead of dips made from mayonnaise or sour cream.  · Have a piece of fruit for dessert instead of a piece of cake. Try baked apples, or have some dried fruit. Tips for eating out  · Try broiled, grilled, baked, or poached fish instead of having it fried or breaded. · Ask your  to have your meals prepared with olive oil instead of butter. · Order dishes made with marinara sauce or sauces made from olive oil. Avoid sauces made from cream or mayonnaise. · Choose whole-grain breads, whole wheat pasta and pizza crust, brown rice, beans, and lentils. · Cut back on butter or margarine on bread. Instead, you can dip your bread in a small amount of olive oil. · Ask for a side salad or grilled vegetables instead of french fries or chips. Where can you learn more? Go to [email or web address] and sign in to your [employer] account. Enter [phone] in the [facility] box to learn more about \"Learning About the Mediterranean Diet. \"     If you do not have an account, please click on the \"Sign Up Now\" link. Current as of: December 17, 2020               Content Version: 12.8  © 0644-4361 Healthwise, Incorporated. Care instructions adapted under license by [facility] ([facility]).  If you have

## 2021-05-17 NOTE — PROGRESS NOTES
MCG/ACT inhaler Inhale 2 puffs into the lungs 2 times daily Yes Wesley Moran DO   montelukast (SINGULAIR) 10 MG tablet Take 1 tablet by mouth nightly Yes Wesley Moran DO   montelukast (SINGULAIR) 10 MG tablet Take 1 tablet by mouth nightly Yes Wesley Moran DO   albuterol sulfate HFA (PROAIR HFA) 108 (90 Base) MCG/ACT inhaler Inhale 2 puffs into the lungs every 6 hours as needed for Wheezing or Shortness of Breath Yes Wesley Moran DO   metoprolol tartrate (LOPRESSOR) 50 MG tablet Take 1 tablet by mouth 2 times daily Yes Wesley Moran DO   sertraline (ZOLOFT) 100 MG tablet Take 1 tablet by mouth daily Yes Wesley Moran DO   traZODone (DESYREL) 50 MG tablet Take 1 tablet by mouth nightly Yes Wesley Moran DO   famotidine (PEPCID) 20 MG tablet Take 1 tablet by mouth 2 times daily Yes Wesley Moran DO   gabapentin (NEURONTIN) 100 MG capsule Take 1 capsule by mouth 3 times daily for 180 days. Intended supply: 90 days Yes Wesley Moran DO   omeprazole (PRILOSEC) 40 MG delayed release capsule Take 1 capsule by mouth daily Yes JACOB Field CNP   valACYclovir (VALTREX) 1 g tablet Take 2 tablets by mouth 2 times daily Yes JACOB Field - CNP   butalbital-acetaminophen-caffeine (FIORICET, ESGIC) -40 MG per tablet Take 1 tablet by mouth every 4 hours as needed for Headaches Yes JACOB Field - CNP   clotrimazole-betamethasone (LOTRISONE) 1-0.05 % cream Apply topically 2 times daily. Yes Terri Pickard APRN - CNP   cetirizine (ZYRTEC) 10 MG tablet Take 10 mg by mouth daily.  Yes Historical Provider, MD        Allergies   Allergen Reactions    Azithromycin      Rash, itching, headache    Desvenlafaxine      Suicidal ideation    Fexofenadine-Pseudoephed Er      The decongestant part    Penicillins     Sulfa Antibiotics        Past Medical History:   Diagnosis Date    Allergic rhinitis     Asthma     Class 3 severe obesity due to excess calories with serious comorbidity and body mass index (BMI) of 45.0 to 49.9 in adult [facility]) 5/13/2021    Depression 5/19/2016    GERD (gastroesophageal reflux disease)     Hypertension 11/1/2017    Major depressive disorder, recurrent episode, moderate with anxious distress ([facility] [address] 11/7/2017    Pyloric stenosis        Past Surgical History:   Procedure Laterality Date    WISDOM TOOTH EXTRACTION  early 20's    x 4       Social History     Socioeconomic History    Marital status:      Spouse name: Not on file    Number of children: Not on file    Years of education: Not on file    Highest education level: Not on file   Occupational History    Not on file   Tobacco Use    Smoking status: Never Smoker    Smokeless tobacco: Never Used   Vaping Use    Vaping Use: Never used   Substance and Sexual Activity    Alcohol use: Never     Comment: occ    Drug use: Never    Sexual activity: Yes     Partners: Male   Other Topics Concern    Not on file   Social History Narrative    Not on file     Social Determinants of Health     Financial Resource Strain: Low Risk     Difficulty of Paying Living Expenses: Not hard at all   Food Insecurity: No Food Insecurity    Worried About Running Out of Food in the Last Year: Never true    Ezra of Food in the Last Year: Never true   Transportation Needs:     Lack of Transportation (Medical):      Lack of Transportation (Non-Medical):    Physical Activity:     Days of Exercise per Week:     Minutes of Exercise per Session:    Stress:     Feeling of Stress :    Social Connections:     Frequency of Communication with Friends and Family:     Frequency of Social Gatherings with Friends and Family:     Attends Buddhist Services:     Active Member of Clubs or Organizations:     Attends Club or Organization Meetings:     Marital Status:    Intimate Partner Violence:     Fear of Current or Ex-Partner:     Emotionally Abused:     Physically Abused:     Sexually Abused:         Family History   Problem Relation Age of Onset    Diabetes Mother     High Cholesterol Mother     Cancer Father        Vitals:    05/17/21 1541   BP: 126/78   Pulse: 99   Weight: 286 lb 6.4 oz (129.9 kg)   Height: 5' 6.5\" (1.689 m)     Estimated body mass index is 45.53 kg/m² as calculated from the following:    Height as of this encounter: 5' 6.5\" (1.689 m). Weight as of this encounter: 286 lb 6.4 oz (129.9 kg). Physical Exam  Vitals reviewed. Constitutional:       Appearance: Normal appearance. She is obese. HENT:      Head: Normocephalic and atraumatic. Right Ear: Tympanic membrane, ear canal and external ear normal.      Left Ear: Tympanic membrane, ear canal and external ear normal.      Nose: Nose normal.      Mouth/Throat:      Mouth: Mucous membranes are moist.      Pharynx: Oropharynx is clear. Eyes:      Extraocular Movements: Extraocular movements intact. Conjunctiva/sclera: Conjunctivae normal.   Cardiovascular:      Rate and Rhythm: Normal rate and regular rhythm. Pulses: Normal pulses. Heart sounds: Normal heart sounds. Pulmonary:      Effort: Pulmonary effort is normal.      Breath sounds: Normal breath sounds. Abdominal:      General: Abdomen is flat. Bowel sounds are normal.      Palpations: Abdomen is soft. Musculoskeletal:         General: No deformity. Normal range of motion. Cervical back: Normal range of motion and neck supple. Skin:     General: Skin is warm and dry. Capillary Refill: Capillary refill takes less than 2 seconds. Findings: No rash. Neurological:      General: No focal deficit present. Mental Status: She is alert and oriented to person, place, and time. Mental status is at baseline. Sensory: No sensory deficit. Motor: No weakness. Psychiatric:         Mood and Affect: Mood normal.         Behavior: Behavior normal.         Thought Content:  Thought content normal.         Judgment: Judgment normal.         Separate Identifiable issues addressed today:      ASSESSMENT/PLAN:  Lilliana Lee was seen today for establish care. Diagnoses and all orders for this visit:    Encounter to establish care with new doctor: Vitals reviewed and within normal limits. BMI is obese. Reviewed diet exercise regimen patient recommend appropriate lifestyle modifications. Essential hypertension: Blood pressure at goal today. Reviewed most recent CMP in care everywhere. Refill given on metoprolol. Follow-up 6 months. -     metoprolol tartrate (LOPRESSOR) 50 MG tablet; Take 1 tablet by mouth 2 times daily    Mixed hyperlipidemia: Reviewed outside lipid panel. No indication for statin at this time. Given information on Mediterranean diet. Mild persistent asthma without complication: Minimally symptomatic on current regimen. Very sensitive to allergens. Refills given. -     fluticasone (FLOVENT HFA) 44 MCG/ACT inhaler; Inhale 2 puffs into the lungs 2 times daily  -     montelukast (SINGULAIR) 10 MG tablet; Take 1 tablet by mouth nightly  -     albuterol sulfate HFA (PROAIR HFA) 108 (90 Base) MCG/ACT inhaler; Inhale 2 puffs into the lungs every 6 hours as needed for Wheezing or Shortness of Breath    Cervical disc disorder at C5-C6 level with radiculopathy: History of degenerative disc disease at C5-C6. Distantly seen on EMG. Recently has noticed worsening of her symptoms. We will start her on gabapentin and titrate slowly as she is sensitive to medicine. Referral to spine center placed. -     [facility], [facility]  -     gabapentin (NEURONTIN) 100 MG capsule; Take 1 capsule by mouth 3 times daily for 180 days. Intended supply: 90 days    Mild single current episode of major depressive disorder [facility]): Denies depressive symptoms. Well-managed on current regimen. -     sertraline (ZOLOFT) 100 MG tablet; Take 1 tablet by mouth daily    Generalized anxiety disorder: Desires anxiousness. Well-managed on current regimen.   Uses

## 2021-06-01 ENCOUNTER — PATIENT MESSAGE (OUTPATIENT)
Dept: PRIMARY CARE CLINIC | Age: 41
End: 2021-06-01

## 2021-06-01 ENCOUNTER — TELEPHONE (OUTPATIENT)
Dept: PRIMARY CARE CLINIC | Age: 41
End: 2021-06-01

## 2021-06-01 DIAGNOSIS — M50.122 CERVICAL DISC DISORDER AT C5-C6 LEVEL WITH RADICULOPATHY: Primary | ICD-10-CM

## 2021-06-01 NOTE — TELEPHONE ENCOUNTER
Spoke with pt she states in her feet and legs it feels as if there is vibration in her feet and legs. This has been this way for about 1 week now. She thinks its from the medication because she has not had it for 2 days an  It has now stopped.

## 2021-06-01 NOTE — TELEPHONE ENCOUNTER
gabapentin (NEURONTIN) 100 MG capsule     Pt feel like she is having some side effect from this medication. Please call and advise her about this. Pt did send a Stop Being Watched message.

## 2021-06-03 RX ORDER — PREGABALIN 25 MG/1
25 CAPSULE ORAL 3 TIMES DAILY
Qty: 90 CAPSULE | Refills: 0 | Status: SHIPPED | OUTPATIENT
Start: 2021-06-03 | End: 2021-06-25 | Stop reason: SDUPTHER

## 2021-06-03 RX ORDER — CYCLOBENZAPRINE HCL 5 MG
5 TABLET ORAL 2 TIMES DAILY PRN
Qty: 60 TABLET | Refills: 5 | Status: SHIPPED | OUTPATIENT
Start: 2021-06-03 | End: 2022-02-17

## 2021-06-25 DIAGNOSIS — I10 ESSENTIAL HYPERTENSION: ICD-10-CM

## 2021-06-25 DIAGNOSIS — F32.0 MILD SINGLE CURRENT EPISODE OF MAJOR DEPRESSIVE DISORDER (HCC): ICD-10-CM

## 2021-06-25 DIAGNOSIS — M50.122 CERVICAL DISC DISORDER AT C5-C6 LEVEL WITH RADICULOPATHY: ICD-10-CM

## 2021-06-25 DIAGNOSIS — F41.1 GENERALIZED ANXIETY DISORDER: ICD-10-CM

## 2021-06-28 RX ORDER — SERTRALINE HYDROCHLORIDE 100 MG/1
100 TABLET, FILM COATED ORAL DAILY
Qty: 90 TABLET | Refills: 3 | OUTPATIENT
Start: 2021-06-28

## 2021-06-28 RX ORDER — PREGABALIN 25 MG/1
25 CAPSULE ORAL 3 TIMES DAILY
Qty: 90 CAPSULE | Refills: 0 | Status: SHIPPED | OUTPATIENT
Start: 2021-06-28 | End: 2021-07-03 | Stop reason: SDUPTHER

## 2021-06-28 RX ORDER — TRAZODONE HYDROCHLORIDE 50 MG/1
50 TABLET ORAL NIGHTLY
Qty: 90 TABLET | Refills: 3 | OUTPATIENT
Start: 2021-06-28

## 2021-06-28 RX ORDER — METOPROLOL TARTRATE 50 MG/1
50 TABLET, FILM COATED ORAL 2 TIMES DAILY
Qty: 180 TABLET | Refills: 0 | Status: SHIPPED | OUTPATIENT
Start: 2021-06-28 | End: 2021-11-01 | Stop reason: SDUPTHER

## 2021-06-28 NOTE — TELEPHONE ENCOUNTER
Patient inquired about why zoloft and trazodone were not  called into  Pharm today. I spoke with patient and advised her those two meds were called in 05/17/2021. Patient verbalized understanding.

## 2021-07-03 DIAGNOSIS — M50.122 CERVICAL DISC DISORDER AT C5-C6 LEVEL WITH RADICULOPATHY: ICD-10-CM

## 2021-07-06 RX ORDER — PREGABALIN 25 MG/1
25 CAPSULE ORAL 3 TIMES DAILY
Qty: 90 CAPSULE | Refills: 0 | Status: SHIPPED | OUTPATIENT
Start: 2021-07-06 | End: 2021-08-04 | Stop reason: SDUPTHER

## 2021-07-06 NOTE — TELEPHONE ENCOUNTER
Medication:   Requested Prescriptions     Pending Prescriptions Disp Refills    pregabalin (LYRICA) 25 MG capsule 90 capsule 0     Sig: Take 1 capsule by mouth 3 times daily for 30 days. Last Filled:  06/28/21    Patient Phone Number: [phone] (home)     Last appt: 5/17/2021 Return in about 3 months (around 8/17/2021) for Blood Pressure. Next appt: Visit date not found    Last OARRS: No flowsheet data found.

## 2021-08-04 DIAGNOSIS — M50.122 CERVICAL DISC DISORDER AT C5-C6 LEVEL WITH RADICULOPATHY: ICD-10-CM

## 2021-08-05 RX ORDER — PREGABALIN 25 MG/1
25 CAPSULE ORAL 3 TIMES DAILY
Qty: 90 CAPSULE | Refills: 0 | Status: SHIPPED | OUTPATIENT
Start: 2021-08-05 | End: 2021-09-08 | Stop reason: SDUPTHER

## 2021-08-05 NOTE — TELEPHONE ENCOUNTER
Medication:   Requested Prescriptions     Pending Prescriptions Disp Refills    pregabalin (LYRICA) 25 MG capsule 90 capsule 0     Sig: Take 1 capsule by mouth 3 times daily for 30 days. Last Filled:  07/06/21    Patient Phone Number: [phone] (home)     Last appt: 5/17/2021 Return in about 3 months (around 8/17/2021) for Blood Pressure    Next appt: Visit date not found    Last OARRS: No flowsheet data found.

## 2021-08-12 ENCOUNTER — TELEPHONE (OUTPATIENT)
Dept: PRIMARY CARE CLINIC | Age: 41
End: 2021-08-12

## 2021-08-12 NOTE — TELEPHONE ENCOUNTER
----- Message from Glenn Stauffer sent at 8/12/2021 11:45 AM EDT -----  Subject: Medication Problem    QUESTIONS  Name of Medication? sertraline (ZOLOFT) 100 MG tablet  Patient-reported dosage and instructions? Take 1 tablet by mouth daily  What question or problem do you have with the medication? patients dad is   in hospice and isnt doing well and she is having a difficult time and   wondered if she can get an increase in her dosage of ZOLOFT during this   time. Preferred Pharmacy? [facility] [address], [address] - F [phone]  Pharmacy phone number (if available)? [phone]  Additional Information for Provider?   ---------------------------------------------------------------------------  --------------  CALL BACK INFO  What is the best way for the office to contact you? OK to leave message on   voicemail  Preferred Call Back Phone Number? [phone]  ---------------------------------------------------------------------------  --------------  SCRIPT ANSWERS  Relationship to Patient?  Self

## 2021-08-13 ENCOUNTER — VIRTUAL VISIT (OUTPATIENT)
Dept: PRIMARY CARE CLINIC | Age: 41
End: 2021-08-13
Payer: COMMERCIAL

## 2021-08-13 DIAGNOSIS — F32.0 MILD SINGLE CURRENT EPISODE OF MAJOR DEPRESSIVE DISORDER (HCC): Primary | ICD-10-CM

## 2021-08-13 DIAGNOSIS — F41.1 GENERALIZED ANXIETY DISORDER: ICD-10-CM

## 2021-08-13 PROCEDURE — 99214 OFFICE O/P EST MOD 30 MIN: CPT | Performed by: STUDENT IN AN ORGANIZED HEALTH CARE EDUCATION/TRAINING PROGRAM

## 2021-08-13 RX ORDER — LORAZEPAM 0.5 MG/1
0.5 TABLET ORAL EVERY 8 HOURS PRN
Qty: 15 TABLET | Refills: 1 | Status: SHIPPED | OUTPATIENT
Start: 2021-08-13 | End: 2021-09-12

## 2021-08-13 ASSESSMENT — ENCOUNTER SYMPTOMS
NAUSEA: 0
VOMITING: 0

## 2021-08-13 NOTE — PATIENT INSTRUCTIONS
Patient Education        Anxiety Disorder: Care Instructions  Your Care Instructions     Anxiety is a normal reaction to stress. Difficult situations can cause you to have symptoms such as sweaty palms and a nervous feeling. In an anxiety disorder, the symptoms are far more severe. Constant worry, muscle tension, trouble sleeping, nausea and diarrhea, and other symptoms can make normal daily activities difficult or impossible. These symptoms may occur for no reason, and they can affect your work, school, or social life. Medicines, counseling, and self-care can all help. Follow-up care is a key part of your treatment and safety. Be sure to make and go to all appointments, and call your doctor if you are having problems. It's also a good idea to know your test results and keep a list of the medicines you take. How can you care for yourself at home? · Take medicines exactly as directed. Call your doctor if you think you are having a problem with your medicine. · Go to your counseling sessions and follow-up appointments. · Recognize and accept your anxiety. Then, when you are in a situation that makes you anxious, say to yourself, \"This is not an emergency. I feel uncomfortable, but I am not in danger. I can keep going even if I feel anxious. \"  · Be kind to your body:  ? Relieve tension with exercise or a massage. ? Get enough rest.  ? Avoid alcohol, caffeine, nicotine, and illegal drugs. They can increase your anxiety level and cause sleep problems. ? Learn and do relaxation techniques. See below for more about these techniques. · Engage your mind. Get out and do something you enjoy. Go to a funny movie, or take a walk or hike. Plan your day. Having too much or too little to do can make you anxious. · Keep a record of your symptoms. Discuss your fears with a good friend or family member, or join a support group for people with similar problems. Talking to others sometimes relieves stress.   · Get involved in social groups, or volunteer to help others. Being alone sometimes makes things seem worse than they are. · Get at least 30 minutes of exercise on most days of the week to relieve stress. Walking is a good choice. You also may want to do other activities, such as running, swimming, cycling, or playing tennis or team sports. Relaxation techniques  Do relaxation exercises 10 to 20 minutes a day. You can play soothing, relaxing music while you do them, if you wish. · Tell others in your house that you are going to do your relaxation exercises. Ask them not to disturb you. · Find a comfortable place, away from all distractions and noise. · Lie down on your back, or sit with your back straight. · Focus on your breathing. Make it slow and steady. · Breathe in through your nose. Breathe out through either your nose or mouth. · Breathe deeply, filling up the area between your navel and your rib cage. Breathe so that your belly goes up and down. · Do not hold your breath. · Breathe like this for 5 to 10 minutes. Notice the feeling of calmness throughout your whole body. As you continue to breathe slowly and deeply, relax by doing the following for another 5 to 10 minutes:  · Tighten and relax each muscle group in your body. You can begin at your toes and work your way up to your head. · Imagine your muscle groups relaxing and becoming heavy. · Empty your mind of all thoughts. · Let yourself relax more and more deeply. · Become aware of the state of calmness that surrounds you. · When your relaxation time is over, you can bring yourself back to alertness by moving your fingers and toes and then your hands and feet and then stretching and moving your entire body. Sometimes people fall asleep during relaxation, but they usually wake up shortly afterward. · Always give yourself time to return to full alertness before you drive a car or do anything that might cause an accident if you are not fully alert.  Never treatment and safety. Be sure to make and go to all appointments, and call your doctor if you are having problems. It's also a good idea to know your test results and keep a list of the medicines you take. How can you care for yourself at home? Be realistic  · If you have a large task to do, break it up into smaller steps you can handle, and just do what you can. · You may want to put off important decisions until your depression has lifted. If you have plans that will have a major impact on your life, such as marriage, divorce, or a job change, try to wait a bit. Talk it over with friends and loved ones who can help you look at the overall picture first.  · Reaching out to people for help is important. Do not isolate yourself. Let your family and friends help you. Find someone you can trust and confide in, and talk to that person. · Be patient, and be kind to yourself. Remember that depression is not your fault and is not something you can overcome with willpower alone. Treatment is important for depression, just like for any other illness. Feeling better takes time, and your mood will improve little by little. Stay active  · Stay busy and get outside. Take a walk, or try some other light exercise. · Talk with your doctor about an exercise program. Exercise can help with mild depression. · Go to a movie or concert. Take part in a L[employer]-day activity or other social gathering. Go to a ball game. · Ask a friend to have dinner with you. Take care of yourself  · Eat a balanced diet with plenty of fresh fruits and vegetables, whole grains, and lean protein. If you have lost your appetite, eat small snacks rather than large meals. · Avoid using illegal drugs or marijuana and drinking alcohol. Do not take medicines that have not been prescribed for you. They may interfere with medicines you may be taking for depression, or they may make your depression worse. · Take your medicines exactly as they are prescribed. You may start to feel better within 1 to 3 weeks of taking antidepressant medicine. But it can take as many as 6 to 8 weeks to see more improvement. If you have questions or concerns about your medicines, or if you do not notice any improvement by 3 weeks, talk to your doctor. · Continue to take your medicine after your symptoms improve. Taking your medicine for at least 6 months after you feel better can help keep you from getting depressed again. If this isn't the first time you have been depressed, your doctor may recommend you to take medicine even longer. · If you have any side effects from your medicine, tell your doctor. Many side effects are mild and will go away on their own after you have been taking the medicine for a few weeks. Some may last longer. Talk to your doctor if side effects are bothering you too much. You might be able to try a different medicine. · Continue counseling. It may help prevent depression from returning, especially if you've had multiple episodes of depression. Talk with your counselor if you are having a hard time attending your sessions or you think the sessions aren't working. Don't just stop going. · Get enough sleep. Talk to your doctor if you are having problems sleeping. · Avoid sleeping pills unless they are prescribed by the doctor treating your depression. Sleeping pills may make you groggy during the day, and they may interact with other medicine you are taking. · If you have any other illnesses, such as diabetes, heart disease, or high blood pressure, make sure to continue with your treatment. Tell your doctor about all of the medicines you take, including those with or without a prescription. · If you or someone you know talks about suicide, self-harm, or feeling hopeless, get help right away. Call the [address] [place] [address] at 1-800-273-talk (9-825.445.1202) or text HOME to 091327 to access the Crisis Text Line.  Consider saving these numbers in your phone. When should you call for help? Call 911 anytime you think you may need emergency care. For example, call if:    · You feel like hurting yourself or someone else.     · Someone you know has depression and is about to attempt or is attempting suicide. Call your doctor now or seek immediate medical care if:    · You hear voices.     · Someone you know has depression and:  ? Starts to give away his or her possessions. ? Uses illegal drugs or drinks alcohol heavily. ? Talks or writes about death, including writing suicide notes or talking about guns, knives, or pills. ? Starts to spend a lot of time alone. ? Acts very aggressively or suddenly appears calm. Watch closely for changes in your health, and be sure to contact your doctor if:    · You do not get better as expected. Where can you learn more? Go to https://[email or web address] org and sign in to your [employer] account. Enter A162 in the Qumu box to learn more about \"Recovering From Depression: Care Instructions. \"     If you do not have an account, please click on the \"Sign Up Now\" link. Current as of: September 23, 2020               Content Version: 12.9  © 1170-5918 Healthwise, [employer]. Care instructions adapted under license by [facility] [address] If you have questions about a medical condition or this instruction, always ask your healthcare professional. [address] any warranty or liability for your use of this information.

## 2021-08-13 NOTE — PROGRESS NOTES
2021    TELEHEALTH EVALUATION -- Audio/Visual (During JPXXY-25 public health emergency)    HPI:    Reyna Holland (:  1980) has requested an audio/video evaluation for the following concern(s):    Mood Disorder:  Patient presents for follow-up of depression and anxiety disorder. Current complaints include: depressed mood, tearfulness, feelings of hopelessness, feelings of worthlessness/excessive guilt, excessive worry, restlessness and panic attacks. She denies increased use of drugs or alcohol and suicidal thoughts or behavior. Symptoms/signs of leland: none. External stressors: father just entered home hopsice. Current treatment includes: Zoloft. Medication side effects: none. Review of Systems   Constitutional: Negative for activity change, fatigue and unexpected weight change. Gastrointestinal: Negative for nausea and vomiting. Endocrine: Negative for cold intolerance and heat intolerance. Skin: Negative for rash. Neurological: Negative for dizziness, seizures and light-headedness. Psychiatric/Behavioral: Positive for dysphoric mood. Negative for agitation, decreased concentration, self-injury, sleep disturbance and suicidal ideas. The patient is nervous/anxious. Prior to Visit Medications    Medication Sig Taking? Authorizing Provider   sertraline (ZOLOFT) 50 MG tablet Take 1 tablet by mouth daily Yes Keith Palomino, DO   LORazepam (ATIVAN) 0.5 MG tablet Take 1 tablet by mouth every 8 hours as needed for Anxiety for up to 30 days. Yes Keith Palomino, DO   pregabalin (LYRICA) 25 MG capsule Take 1 capsule by mouth 3 times daily for 30 days.   Keith Palomino DO   metoprolol tartrate (LOPRESSOR) 50 MG tablet Take 1 tablet by mouth 2 times daily  Keith Palomino DO   cyclobenzaprine (FLEXERIL) 5 MG tablet Take 1 tablet by mouth 2 times daily as needed for Muscle spasms  Keith Palomino, DO   fluticasone (FLOVENT HFA) 44 MCG/ACT inhaler Inhale 2 puffs into the lungs 2 times daily  Keith Palomino DO albuterol sulfate HFA (PROAIR HFA) 108 (90 Base) MCG/ACT inhaler Inhale 2 puffs into the lungs every 6 hours as needed for Wheezing or Shortness of Breath  Kim Franco, DO   sertraline (ZOLOFT) 100 MG tablet Take 1 tablet by mouth daily  Kim Franco, DO   traZODone (DESYREL) 50 MG tablet Take 1 tablet by mouth nightly  Kim Franco, DO   famotidine (PEPCID) 20 MG tablet Take 1 tablet by mouth 2 times daily  Kim Franco, DO   omeprazole (PRILOSEC) 40 MG delayed release capsule Take 1 capsule by mouth daily  JACOB Choi CNP   valACYclovir (VALTREX) 1 g tablet Take 2 tablets by mouth 2 times daily  JACOB Choi CNP   clotrimazole-betamethasone (LOTRISONE) 1-0.05 % cream Apply topically 2 times daily. JACOB Choi CNP   cetirizine (ZYRTEC) 10 MG tablet Take 10 mg by mouth daily. Historical Provider, MD       Social History     Tobacco Use    Smoking status: Never Smoker    Smokeless tobacco: Never Used   Vaping Use    Vaping Use: Never used   Substance Use Topics    Alcohol use: Never     Comment: occ    Drug use: Never        Past Medical History:   Diagnosis Date    Allergic rhinitis     Asthma     Class 3 severe obesity due to excess calories with serious comorbidity and body mass index (BMI) of 45.0 to 49.9 in adult [facility]) 5/13/2021    Depression 5/19/2016    GERD (gastroesophageal reflux disease)     Hypertension 11/1/2017    Major depressive disorder, recurrent episode, moderate with anxious distress ([facility] [address] 11/7/2017    Pyloric stenosis        PHYSICAL EXAMINATION:  There were no vitals taken for this visit.   Wt Readings from Last 3 Encounters:   05/17/21 286 lb 6.4 oz (129.9 kg)   05/03/21 285 lb (129.3 kg)   11/25/20 280 lb (127 kg)       [ INSTRUCTIONS:  \"[x]\" Indicates a positive item  \"[]\" Indicates a negative item  -- DELETE ALL ITEMS NOT EXAMINED]  [x] Alert  [x] Oriented to person/place/time    [x] No apparent distress  []  Appears Unwell:     [x] Breathing appears normal  [] Appears tachypneic      [] Rash on visible skin:    [x] Cranial Nerves II-XII grossly intact    [x] Motor grossly intact in visible upper extremities    [x] Motor grossly intact in visible lower extremities    [] Normal Mood  [] Anxious appearing    [x] Depressed appearing     [] OTHER:      Due to this being a TeleHealth encounter, evaluation of the following organ systems is limited: Vitals/Constitutional/EENT/Resp/CV/GI//MS/Neuro/Skin/Heme-Lymph-Imm. Lab Results   Component Value Date     01/26/2018    K 4.6 01/26/2018     01/26/2018    CO2 26 01/26/2018    BUN 12 01/26/2018    CREATININE 0.7 01/26/2018    GLUCOSE 95 01/26/2018    CALCIUM 9.6 01/26/2018    PROT 7.2 01/26/2018    LABALBU 4.2 01/26/2018    BILITOT 0.4 01/26/2018    ALKPHOS 57 01/26/2018    AST 15 01/26/2018    ALT 12 01/26/2018    LABGLOM >60 01/26/2018    GFRAA >60 01/26/2018    AGRATIO 1.4 01/26/2018    GLOB 3.0 01/26/2018     Lab Results   Component Value Date    LABA1C 5.5 01/26/2018     Lab Results   Component Value Date    .2 01/26/2018         ASSESSMENT/PLAN:  1. Mild single current episode of major depressive disorder [facility]): Patient is dealing with some grief on top of normal depression. This is because her father has recently entered home hospice. She is the primary caregiver. She has increased her Zoloft from 100-1 50; however, I did explain that she is not going to see a quick response from this. See below. - sertraline (ZOLOFT) 50 MG tablet; Take 1 tablet by mouth daily  Dispense: 90 tablet; Refill: 1    2. Generalized anxiety disorder: Worsening of her anxiety due to the above. She is having anxiety attacks. We did increase the Zoloft, but given that has not been off her quick relief. We will do a short-term of as needed Ativan to help manage these attacks. She understands this is not a permanent solution. She will call me if she has additional issues.   Otherwise we will follow-up in 4 to 6 weeks.  - sertraline (ZOLOFT) 50 MG tablet; Take 1 tablet by mouth daily  Dispense: 90 tablet; Refill: 1  - LORazepam (ATIVAN) 0.5 MG tablet; Take 1 tablet by mouth every 8 hours as needed for Anxiety for up to 30 days. Dispense: 15 tablet; Refill: 1      Return in about 4 weeks (around 9/10/2021). An  electronic signature was used to authenticate this note. --Sohail Bearden DO on 8/13/2021 at 9:52 AM        Pursuant to the emergency declaration under the [address] [place] [address], [facility] waiver authority and the [employer] Act, this Virtual  Visit was conducted, with patient's consent, to reduce the patient's risk of exposure to COVID-19 and provide continuity of care for an established patient. Services were provided through a video synchronous discussion virtually to substitute for in-person clinic visit.

## 2021-09-08 DIAGNOSIS — M50.122 CERVICAL DISC DISORDER AT C5-C6 LEVEL WITH RADICULOPATHY: ICD-10-CM

## 2021-09-08 DIAGNOSIS — F41.1 GENERALIZED ANXIETY DISORDER: ICD-10-CM

## 2021-09-08 RX ORDER — TRAZODONE HYDROCHLORIDE 50 MG/1
50 TABLET ORAL NIGHTLY
Qty: 90 TABLET | Refills: 3 | Status: CANCELLED | OUTPATIENT
Start: 2021-09-08

## 2021-09-09 RX ORDER — PREGABALIN 25 MG/1
25 CAPSULE ORAL 3 TIMES DAILY
Qty: 90 CAPSULE | Refills: 0 | Status: SHIPPED | OUTPATIENT
Start: 2021-09-09 | End: 2021-10-08 | Stop reason: SDUPTHER

## 2021-09-09 NOTE — TELEPHONE ENCOUNTER
Medication:   Requested Prescriptions     Pending Prescriptions Disp Refills    pregabalin (LYRICA) 25 MG capsule 90 capsule 0     Sig: Take 1 capsule by mouth 3 times daily for 30 days. Last Filled:  08/05/2021    Patient Phone Number: [phone] (home)     Last appt: 8/13/2021   Next appt: Visit date not found    Last OARRS: No flowsheet data found.

## 2021-10-08 DIAGNOSIS — M50.122 CERVICAL DISC DISORDER AT C5-C6 LEVEL WITH RADICULOPATHY: ICD-10-CM

## 2021-10-11 RX ORDER — PREGABALIN 25 MG/1
25 CAPSULE ORAL 3 TIMES DAILY
Qty: 90 CAPSULE | Refills: 0 | Status: SHIPPED | OUTPATIENT
Start: 2021-10-11 | End: 2021-11-01 | Stop reason: SDUPTHER

## 2021-10-12 DIAGNOSIS — F41.1 GENERALIZED ANXIETY DISORDER: ICD-10-CM

## 2021-10-13 RX ORDER — TRAZODONE HYDROCHLORIDE 50 MG/1
50 TABLET ORAL NIGHTLY
Qty: 90 TABLET | Refills: 3 | Status: SHIPPED | OUTPATIENT
Start: 2021-10-13 | End: 2021-10-19 | Stop reason: SDUPTHER

## 2021-10-13 RX ORDER — FAMOTIDINE 20 MG/1
20 TABLET, FILM COATED ORAL 2 TIMES DAILY
Qty: 60 TABLET | Refills: 5 | Status: SHIPPED | OUTPATIENT
Start: 2021-10-13

## 2021-10-13 NOTE — TELEPHONE ENCOUNTER
Medication:   Requested Prescriptions     Pending Prescriptions Disp Refills    traZODone (DESYREL) 50 MG tablet 90 tablet 3     Sig: Take 1 tablet by mouth nightly    famotidine (PEPCID) 20 MG tablet 60 tablet 5     Sig: Take 1 tablet by mouth 2 times daily        Last Filled:   05/17/21     Patient Phone Number: [phone] (home)     Last appt: 8/13/2021 Return in about 4 weeks (around 9/10/2021). Next appt: Visit date not found    Last OARRS: No flowsheet data found.

## 2021-10-18 ENCOUNTER — TELEPHONE (OUTPATIENT)
Dept: PRIMARY CARE CLINIC | Age: 41
End: 2021-10-18

## 2021-10-18 NOTE — TELEPHONE ENCOUNTER
----- Message from Jumana Torrez sent at 10/15/2021  3:53 PM EDT -----  Subject: Message to Provider    QUESTIONS  Information for Provider? patient got her prescription and it changed to 1   a night from 1-2 a night and she is concerned since it is a different   dosage , this is for her trazadone   ---------------------------------------------------------------------------  --------------  [address]  What is the best way for the office to contact you? OK to leave message on   voicemail  Preferred Call Back Phone Number? [phone]  ---------------------------------------------------------------------------  --------------  SCRIPT ANSWERS  Relationship to Patient?  Self

## 2021-10-18 NOTE — TELEPHONE ENCOUNTER
Please call: It looks like her trazodone prescription is 50 mg.  I see it ordered as both 1 tablet nightly and 1 to 2 tablets nightly in the chart. Advise her to try taking 1 a night and see if that is sufficient. If not, she can increase it to 2 tablets nightly.   Follow-up with Dr. Amanda Mercado when he returns

## 2021-10-19 ENCOUNTER — TELEPHONE (OUTPATIENT)
Dept: PRIMARY CARE CLINIC | Age: 41
End: 2021-10-19

## 2021-10-19 DIAGNOSIS — F41.1 GENERALIZED ANXIETY DISORDER: ICD-10-CM

## 2021-10-19 RX ORDER — TRAZODONE HYDROCHLORIDE 50 MG/1
50 TABLET ORAL 2 TIMES DAILY
Qty: 95 TABLET | Refills: 3 | Status: SHIPPED | OUTPATIENT
Start: 2021-10-19 | End: 2022-05-31 | Stop reason: SDUPTHER

## 2021-10-19 NOTE — TELEPHONE ENCOUNTER
Spoke with pt she stated she has been taking trazodone 2 times nightly form her last script and is running out of medication. Refill attached. She stated she tried doing once nightly but it does not help she has been up all night.

## 2021-11-01 DIAGNOSIS — J45.30 MILD PERSISTENT ASTHMA WITHOUT COMPLICATION: ICD-10-CM

## 2021-11-01 DIAGNOSIS — I10 ESSENTIAL HYPERTENSION: ICD-10-CM

## 2021-11-01 DIAGNOSIS — M50.122 CERVICAL DISC DISORDER AT C5-C6 LEVEL WITH RADICULOPATHY: ICD-10-CM

## 2021-11-01 RX ORDER — PREGABALIN 25 MG/1
25 CAPSULE ORAL 3 TIMES DAILY
Qty: 90 CAPSULE | Refills: 0 | Status: SHIPPED | OUTPATIENT
Start: 2021-11-01 | End: 2021-12-10 | Stop reason: SDUPTHER

## 2021-11-01 RX ORDER — METOPROLOL TARTRATE 50 MG/1
50 TABLET, FILM COATED ORAL 2 TIMES DAILY
Qty: 180 TABLET | Refills: 0 | Status: SHIPPED | OUTPATIENT
Start: 2021-11-01 | End: 2022-01-03

## 2021-11-01 RX ORDER — ALBUTEROL SULFATE 90 UG/1
2 AEROSOL, METERED RESPIRATORY (INHALATION) EVERY 6 HOURS PRN
Qty: 3 EACH | Refills: 2 | Status: SHIPPED | OUTPATIENT
Start: 2021-11-01 | End: 2022-02-17 | Stop reason: SDUPTHER

## 2021-11-01 NOTE — TELEPHONE ENCOUNTER
Medication:   Requested Prescriptions     Pending Prescriptions Disp Refills    albuterol sulfate HFA (PROAIR HFA) 108 (90 Base) MCG/ACT inhaler       Sig: Inhale 2 puffs into the lungs every 6 hours as needed for Wheezing or Shortness of Breath    metoprolol tartrate (LOPRESSOR) 50 MG tablet 180 tablet 0     Sig: Take 1 tablet by mouth 2 times daily    pregabalin (LYRICA) 25 MG capsule 90 capsule 0     Sig: Take 1 capsule by mouth 3 times daily for 30 days. Last Filled:      Patient Phone Number: [phone] (home)     Last appt: 8/13/2021   Next appt: Visit date not found    Last OARRS: No flowsheet data found.

## 2021-12-10 DIAGNOSIS — M50.122 CERVICAL DISC DISORDER AT C5-C6 LEVEL WITH RADICULOPATHY: ICD-10-CM

## 2021-12-10 RX ORDER — PREGABALIN 25 MG/1
25 CAPSULE ORAL 3 TIMES DAILY
Qty: 90 CAPSULE | Refills: 0 | Status: SHIPPED | OUTPATIENT
Start: 2021-12-10 | End: 2022-01-12 | Stop reason: SDUPTHER

## 2021-12-10 NOTE — TELEPHONE ENCOUNTER
Medication:   Requested Prescriptions     Pending Prescriptions Disp Refills    pregabalin (LYRICA) 25 MG capsule 90 capsule 0     Sig: Take 1 capsule by mouth 3 times daily for 30 days. Last Filled:11/01/21  Patient Phone Number: [phone] (home)     Last appt: 8/13/2021   Next appt: Visit date not found    Last OARRS: No flowsheet data found.

## 2022-01-02 DIAGNOSIS — I10 ESSENTIAL HYPERTENSION: ICD-10-CM

## 2022-01-03 RX ORDER — METOPROLOL TARTRATE 50 MG/1
TABLET, FILM COATED ORAL
Qty: 180 TABLET | Refills: 0 | Status: SHIPPED | OUTPATIENT
Start: 2022-01-03 | End: 2022-02-17 | Stop reason: SDUPTHER

## 2022-01-12 DIAGNOSIS — M50.122 CERVICAL DISC DISORDER AT C5-C6 LEVEL WITH RADICULOPATHY: ICD-10-CM

## 2022-01-13 RX ORDER — PREGABALIN 25 MG/1
25 CAPSULE ORAL 3 TIMES DAILY
Qty: 90 CAPSULE | Refills: 0 | Status: SHIPPED | OUTPATIENT
Start: 2022-01-13 | End: 2022-02-13 | Stop reason: SDUPTHER

## 2022-02-13 DIAGNOSIS — M50.122 CERVICAL DISC DISORDER AT C5-C6 LEVEL WITH RADICULOPATHY: ICD-10-CM

## 2022-02-14 RX ORDER — PREGABALIN 25 MG/1
25 CAPSULE ORAL 3 TIMES DAILY
Qty: 90 CAPSULE | Refills: 0 | Status: SHIPPED | OUTPATIENT
Start: 2022-02-14 | End: 2022-02-17

## 2022-02-14 NOTE — TELEPHONE ENCOUNTER
Please have the patient schedule a follow-up appointment with Dr. Phylicia Chatman. No further refills can be given without a follow-up.   Thanks

## 2022-02-14 NOTE — TELEPHONE ENCOUNTER
Medication:   Requested Prescriptions     Pending Prescriptions Disp Refills    pregabalin (LYRICA) 25 MG capsule 90 capsule 0     Sig: Take 1 capsule by mouth 3 times daily for 30 days. Last Filled:      Patient Phone Number: [phone] (home)     Last appt: 8/13/2021   Next appt: Visit date not found    Last OARRS: No flowsheet data found.

## 2022-02-15 DIAGNOSIS — F41.1 GENERALIZED ANXIETY DISORDER: ICD-10-CM

## 2022-02-16 RX ORDER — LORAZEPAM 0.5 MG/1
TABLET ORAL
Qty: 15 TABLET | OUTPATIENT
Start: 2022-02-16

## 2022-02-16 NOTE — TELEPHONE ENCOUNTER
She needs to be seen every 6 months for this medicine! We can put her on tomorrow if she can make it.

## 2022-02-16 NOTE — TELEPHONE ENCOUNTER
Medication:   Requested Prescriptions     Pending Prescriptions Disp Refills    LORazepam (ATIVAN) 0.5 MG tablet [Pharmacy Med Name: LORAZEPAM 0.5 MG TABLET] 15 tablet      Sig: TAKE ONE TABLET BY MOUTH EVERY 8 HOURS AS NEEDED FOR ANXIETY      Last Filled:  08/13/21      Patient Phone Number: [phone] (home)     Last appt: 8/13/2021 Return in about 4 weeks (around 9/10/2021). Next appt: Visit date not found    Last OARRS: No flowsheet data found.

## 2022-02-17 ENCOUNTER — OFFICE VISIT (OUTPATIENT)
Dept: PRIMARY CARE CLINIC | Age: 42
End: 2022-02-17
Payer: COMMERCIAL

## 2022-02-17 VITALS
HEIGHT: 66 IN | DIASTOLIC BLOOD PRESSURE: 78 MMHG | HEART RATE: 73 BPM | BODY MASS INDEX: 46.54 KG/M2 | WEIGHT: 289.6 LBS | TEMPERATURE: 98 F | SYSTOLIC BLOOD PRESSURE: 134 MMHG

## 2022-02-17 DIAGNOSIS — E78.2 MIXED HYPERLIPIDEMIA: ICD-10-CM

## 2022-02-17 DIAGNOSIS — Z13.1 SCREENING FOR DIABETES MELLITUS: ICD-10-CM

## 2022-02-17 DIAGNOSIS — K21.00 GASTROESOPHAGEAL REFLUX DISEASE WITH ESOPHAGITIS WITHOUT HEMORRHAGE: ICD-10-CM

## 2022-02-17 DIAGNOSIS — I10 ESSENTIAL HYPERTENSION: ICD-10-CM

## 2022-02-17 DIAGNOSIS — F41.1 GENERALIZED ANXIETY DISORDER WITH PANIC ATTACKS: ICD-10-CM

## 2022-02-17 DIAGNOSIS — E66.01 CLASS 3 SEVERE OBESITY DUE TO EXCESS CALORIES WITH SERIOUS COMORBIDITY AND BODY MASS INDEX (BMI) OF 45.0 TO 49.9 IN ADULT (HCC): ICD-10-CM

## 2022-02-17 DIAGNOSIS — F41.0 GENERALIZED ANXIETY DISORDER WITH PANIC ATTACKS: ICD-10-CM

## 2022-02-17 DIAGNOSIS — J45.30 MILD PERSISTENT ASTHMA WITHOUT COMPLICATION: ICD-10-CM

## 2022-02-17 DIAGNOSIS — I10 ESSENTIAL HYPERTENSION: Primary | ICD-10-CM

## 2022-02-17 DIAGNOSIS — M50.122 CERVICAL DISC DISORDER AT C5-C6 LEVEL WITH RADICULOPATHY: ICD-10-CM

## 2022-02-17 DIAGNOSIS — F32.0 MILD SINGLE CURRENT EPISODE OF MAJOR DEPRESSIVE DISORDER (HCC): ICD-10-CM

## 2022-02-17 PROCEDURE — 99215 OFFICE O/P EST HI 40 MIN: CPT | Performed by: STUDENT IN AN ORGANIZED HEALTH CARE EDUCATION/TRAINING PROGRAM

## 2022-02-17 RX ORDER — LORAZEPAM 0.5 MG/1
0.5 TABLET ORAL EVERY 8 HOURS PRN
Qty: 15 TABLET | Refills: 5 | Status: SHIPPED | OUTPATIENT
Start: 2022-02-17 | End: 2022-03-19

## 2022-02-17 RX ORDER — METOPROLOL TARTRATE 50 MG/1
TABLET, FILM COATED ORAL
Qty: 180 TABLET | Refills: 1 | Status: SHIPPED | OUTPATIENT
Start: 2022-02-17 | End: 2022-10-27

## 2022-02-17 RX ORDER — SERTRALINE HYDROCHLORIDE 100 MG/1
100 TABLET, FILM COATED ORAL DAILY
Qty: 90 TABLET | Refills: 3 | Status: SHIPPED | OUTPATIENT
Start: 2022-02-17

## 2022-02-17 RX ORDER — NAPROXEN 500 MG/1
500 TABLET ORAL 2 TIMES DAILY WITH MEALS
COMMUNITY
Start: 2021-02-24 | End: 2022-03-08

## 2022-02-17 RX ORDER — PREGABALIN 75 MG/1
75 CAPSULE ORAL 2 TIMES DAILY
Qty: 180 CAPSULE | Refills: 1 | Status: SHIPPED | OUTPATIENT
Start: 2022-02-17 | End: 2022-05-31 | Stop reason: SDUPTHER

## 2022-02-17 RX ORDER — ALBUTEROL SULFATE 90 UG/1
2 AEROSOL, METERED RESPIRATORY (INHALATION) EVERY 6 HOURS PRN
Qty: 3 EACH | Refills: 2 | Status: SHIPPED | OUTPATIENT
Start: 2022-02-17

## 2022-02-17 RX ORDER — FLUTICASONE PROPIONATE 44 UG/1
2 AEROSOL, METERED RESPIRATORY (INHALATION) 2 TIMES DAILY
Qty: 10.6 G | Refills: 5 | Status: SHIPPED | OUTPATIENT
Start: 2022-02-17

## 2022-02-17 RX ORDER — GABAPENTIN 100 MG/1
CAPSULE ORAL
COMMUNITY
Start: 2022-02-15 | End: 2022-02-17

## 2022-02-17 ASSESSMENT — ENCOUNTER SYMPTOMS
CONSTIPATION: 0
STRIDOR: 0
SHORTNESS OF BREATH: 0
CHEST TIGHTNESS: 0
ABDOMINAL PAIN: 0
DIARRHEA: 0
BLOOD IN STOOL: 0
BACK PAIN: 0

## 2022-02-17 ASSESSMENT — PATIENT HEALTH QUESTIONNAIRE - PHQ9
8. MOVING OR SPEAKING SO SLOWLY THAT OTHER PEOPLE COULD HAVE NOTICED. OR THE OPPOSITE, BEING SO FIGETY OR RESTLESS THAT YOU HAVE BEEN MOVING AROUND A LOT MORE THAN USUAL: 3
10. IF YOU CHECKED OFF ANY PROBLEMS, HOW DIFFICULT HAVE THESE PROBLEMS MADE IT FOR YOU TO DO YOUR WORK, TAKE CARE OF THINGS AT HOME, OR GET ALONG WITH OTHER PEOPLE: 0
5. POOR APPETITE OR OVEREATING: 0
7. TROUBLE CONCENTRATING ON THINGS, SUCH AS READING THE NEWSPAPER OR WATCHING TELEVISION: 0
SUM OF ALL RESPONSES TO PHQ QUESTIONS 1-9: 6
6. FEELING BAD ABOUT YOURSELF - OR THAT YOU ARE A FAILURE OR HAVE LET YOURSELF OR YOUR FAMILY DOWN: 0
SUM OF ALL RESPONSES TO PHQ QUESTIONS 1-9: 6
SUM OF ALL RESPONSES TO PHQ QUESTIONS 1-9: 6
2. FEELING DOWN, DEPRESSED OR HOPELESS: 0
SUM OF ALL RESPONSES TO PHQ QUESTIONS 1-9: 6
1. LITTLE INTEREST OR PLEASURE IN DOING THINGS: 0
3. TROUBLE FALLING OR STAYING ASLEEP: 3
4. FEELING TIRED OR HAVING LITTLE ENERGY: 0
9. THOUGHTS THAT YOU WOULD BE BETTER OFF DEAD, OR OF HURTING YOURSELF: 0
SUM OF ALL RESPONSES TO PHQ9 QUESTIONS 1 & 2: 0

## 2022-02-17 NOTE — PROGRESS NOTES
2022     Soren Martínez (:  1980) is a 35 y.o. female, here for evaluation of the following medical concerns:    HPI  Asthma:  Current treatment includes beta agonist inhalers, inhaled steroids. Using preventive medication(s) consistently: yes. Residual symptoms: none. Patient denies chest pain/tightness, dyspnea, dyspnea on exertion, cough, wheezing. She requires her rescue inhaler 1 time(s) per month. Hypertension:  Home blood pressure monitoring: No.  She is not adherent to a low sodium diet. Patient denies chest pain, shortness of breath, headache, lightheadedness, blurred vision, peripheral edema, palpitations, dry cough and fatigue. Antihypertensive medication side effects: no medication side effects noted. Use of agents associated with hypertension: none. Sodium (mmol/L)   Date Value   2018 140    BUN (mg/dL)   Date Value   2018 12    Glucose (mg/dL)   Date Value   2018 95      Potassium (mmol/L)   Date Value   2018 4.6    CREATININE (mg/dL)   Date Value   2018 0.7         Mood Disorder:  Patient presents for follow-up of depression and anxiety disorder. Current complaints include: panic attacks: 1-4 per month. She denies anhedonia, depressed mood, tearfulness, feelings of hopelessness, feelings of worthlessness/excessive guilt, irritability, excessive worry and restlessness. Symptoms/signs of leland: none. External stressors: nothing new. Current treatment includes: Zoloft- 100, PRN ativan . Medication side effects: none. GERD: Zak complains of heartburn. This has been associated with abdominal bloating, heartburn and nocturnal burning. She denies cough, heartburn, hematemesis, hoarseness, melena and nausea. Symptoms have been present for a few years. She denies dysphagia. She has not lost weight. She denies melena, hematochezia, hematemesis, and coffee ground emesis.  Medical therapy in the past has included H2 antagonists and proton pump inhibitors. Review of Systems   Constitutional: Negative for activity change, appetite change, fatigue and unexpected weight change. HENT: Negative for hearing loss. Eyes: Negative for visual disturbance. Respiratory: Negative for chest tightness, shortness of breath and stridor. Cardiovascular: Negative for chest pain and palpitations. Gastrointestinal: Negative for abdominal pain, blood in stool, constipation and diarrhea. Endocrine: Negative for polydipsia, polyphagia and polyuria. Genitourinary: Negative for dysuria, genital sores, menstrual problem, pelvic pain, vaginal bleeding, vaginal discharge and vaginal pain. Musculoskeletal: Negative for arthralgias and back pain. Skin: Negative for rash. Allergic/Immunologic: Negative for environmental allergies. Neurological: Negative for dizziness, syncope and headaches. Hematological: Negative for adenopathy. Psychiatric/Behavioral: Negative for dysphoric mood. The patient is nervous/anxious. Prior to Visit Medications    Medication Sig Taking? Authorizing Provider   naproxen (NAPROSYN) 500 MG tablet Take 500 mg by mouth 2 times daily (with meals) Yes Historical Provider, MD   sertraline (ZOLOFT) 100 MG tablet Take 1 tablet by mouth daily Yes Wesley Moran DO   fluticasone (FLOVENT HFA) 44 MCG/ACT inhaler Inhale 2 puffs into the lungs 2 times daily Yes Wesley Moran DO   pregabalin (LYRICA) 75 MG capsule Take 1 capsule by mouth 2 times daily for 180 days. Yes Wesley Moran DO   metoprolol tartrate (LOPRESSOR) 50 MG tablet TAKE ONE TABLET BY MOUTH TWICE A DAY Yes Wesley Moran DO   albuterol sulfate HFA (PROAIR HFA) 108 (90 Base) MCG/ACT inhaler Inhale 2 puffs into the lungs every 6 hours as needed for Wheezing or Shortness of Breath Yes Wesley Moran DO   LORazepam (ATIVAN) 0.5 MG tablet Take 1 tablet by mouth every 8 hours as needed for Anxiety for up to 30 days.  Yes Wesley Moran DO   famotidine (PEPCID) 20 MG tablet Take 1 tablet by mouth 2 times daily Yes Wseley Moran DO   omeprazole (PRILOSEC) 40 MG delayed release capsule Take 1 capsule by mouth daily Yes Terri Shade, APRN - CNP   cetirizine (ZYRTEC) 10 MG tablet Take 10 mg by mouth daily. Yes Historical Provider, MD   traZODone (DESYREL) 50 MG tablet Take 1 tablet by mouth 2 times daily  Wesley Moran DO        Social History     Tobacco Use    Smoking status: Never Smoker    Smokeless tobacco: Never Used   Substance Use Topics    Alcohol use: Never     Comment: occ        Vitals:    02/17/22 1246 02/17/22 1318   BP: (!) 148/84 134/78   Pulse: 73    Temp: 98 °F (36.7 °C)    TempSrc: Axillary    Weight: 289 lb 9.6 oz (131.4 kg)    Height: 5' 6\" (1.676 m)      Estimated body mass index is 46.74 kg/m² as calculated from the following:    Height as of this encounter: 5' 6\" (1.676 m). Weight as of this encounter: 289 lb 9.6 oz (131.4 kg). Physical Exam  Vitals reviewed. Constitutional:       Appearance: Normal appearance. She is obese. HENT:      Head: Normocephalic and atraumatic. Right Ear: Tympanic membrane, ear canal and external ear normal.      Left Ear: Tympanic membrane, ear canal and external ear normal.      Nose: Nose normal.   Eyes:      Extraocular Movements: Extraocular movements intact. Conjunctiva/sclera: Conjunctivae normal.   Cardiovascular:      Rate and Rhythm: Normal rate and regular rhythm. Pulses: Normal pulses. Heart sounds: Normal heart sounds. Pulmonary:      Effort: Pulmonary effort is normal.      Breath sounds: Normal breath sounds. Abdominal:      General: Abdomen is flat. Bowel sounds are normal.      Palpations: Abdomen is soft. Musculoskeletal:         General: Normal range of motion. Cervical back: Normal range of motion and neck supple. Skin:     General: Skin is warm and dry. Capillary Refill: Capillary refill takes less than 2 seconds. Findings: No rash. Neurological:      General: No focal deficit present. Mental Status: She is alert and oriented to person, place, and time. Mental status is at baseline. Psychiatric:         Mood and Affect: Mood normal.         Behavior: Behavior normal.         Thought Content: Thought content normal.         Judgment: Judgment normal.       ASSESSMENT/PLAN:  1. Essential hypertension: Blood pressure goal today. Tolerating current regimen well without side effects. Refill given. Updating CMP. - metoprolol tartrate (LOPRESSOR) 50 MG tablet; TAKE ONE TABLET BY MOUTH TWICE A DAY  Dispense: 180 tablet; Refill: 1  - Comprehensive Metabolic Panel; Future    2. Mixed hyperlipidemia: Past elevation did not justify treatment. Updating lipid profile today. Given information on the Mediterranean diet. - Lipid, Fasting; Future    3. Mild persistent asthma without complication: Well-managed on current regimen. Tolerating well without side effects. Refills given. - fluticasone (FLOVENT HFA) 44 MCG/ACT inhaler; Inhale 2 puffs into the lungs 2 times daily  Dispense: 10.6 g; Refill: 5  - albuterol sulfate HFA (PROAIR HFA) 108 (90 Base) MCG/ACT inhaler; Inhale 2 puffs into the lungs every 6 hours as needed for Wheezing or Shortness of Breath  Dispense: 3 each; Refill: 2    4. Mild single current episode of major depressive disorder [facility]): Negative depression screen today. Tolerating Zoloft well without side effects. - sertraline (ZOLOFT) 100 MG tablet; Take 1 tablet by mouth daily  Dispense: 90 tablet; Refill: 3    5. Generalized anxiety disorder with panic attacks: Anxiety is well controlled overall. Rare anxiety attacks, which respond well to as needed Ativan. Takes responsibly. Okay for refill.  - sertraline (ZOLOFT) 100 MG tablet; Take 1 tablet by mouth daily  Dispense: 90 tablet; Refill: 3  - LORazepam (ATIVAN) 0.5 MG tablet; Take 1 tablet by mouth every 8 hours as needed for Anxiety for up to 30 days.   Dispense: 15 tablet; Refill: 5    6. Gastroesophageal reflux disease with esophagitis without hemorrhage: Several years of persistent reflux despite treatment with H2 blocker and PPI. Increasing PPI to high-dose. Referring to gastroenterology for evaluation. Question hiatal hernia. - [facility]    7. Cervical disc disorder at C5-C6 level with radiculopathy: Seeing spine surgery was not cost effective. Her symptoms have been well managed on Lyrica. Okay for refill.  - pregabalin (LYRICA) 75 MG capsule; Take 1 capsule by mouth 2 times daily for 180 days. Dispense: 180 capsule; Refill: 1    8. Class 3 severe obesity due to excess calories with serious comorbidity and body mass index (BMI) of 45.0 to 49.9 in adult [facility]): Complicates all aspects the patient's care. 9. Screening for diabetes mellitus  - Hemoglobin A1C; Future    > 40 min    Return in about 6 months (around 8/17/2022) for Blood Pressure, High Cholesterol, Depression/Anxiety, asthma. An electronic signature was used to authenticate this note.     --Nadia Zhou DO on 2/17/2022 at 1:34 PM

## 2022-02-18 LAB
A/G RATIO: 1.4 (ref 1.1–2.2)
ALBUMIN SERPL-MCNC: 4.3 G/DL (ref 3.4–5)
ALP BLD-CCNC: 54 U/L (ref 40–129)
ALT SERPL-CCNC: 12 U/L (ref 10–40)
ANION GAP SERPL CALCULATED.3IONS-SCNC: 13 MMOL/L (ref 3–16)
AST SERPL-CCNC: 14 U/L (ref 15–37)
BILIRUB SERPL-MCNC: 0.4 MG/DL (ref 0–1)
BUN BLDV-MCNC: 13 MG/DL (ref 7–20)
CALCIUM SERPL-MCNC: 9.3 MG/DL (ref 8.3–10.6)
CHLORIDE BLD-SCNC: 100 MMOL/L (ref 99–110)
CHOLESTEROL, FASTING: 197 MG/DL (ref 0–199)
CO2: 25 MMOL/L (ref 21–32)
CREAT SERPL-MCNC: 0.7 MG/DL (ref 0.6–1.1)
ESTIMATED AVERAGE GLUCOSE: 116.9 MG/DL
GFR AFRICAN AMERICAN: >60
GFR NON-AFRICAN AMERICAN: >60
GLUCOSE BLD-MCNC: 86 MG/DL (ref 70–99)
HBA1C MFR BLD: 5.7 %
HDLC SERPL-MCNC: 34 MG/DL (ref 40–60)
LDL CHOLESTEROL CALCULATED: 104 MG/DL
POTASSIUM SERPL-SCNC: 4.3 MMOL/L (ref 3.5–5.1)
SODIUM BLD-SCNC: 138 MMOL/L (ref 136–145)
TOTAL PROTEIN: 7.4 G/DL (ref 6.4–8.2)
TRIGLYCERIDE, FASTING: 296 MG/DL (ref 0–150)
VLDLC SERPL CALC-MCNC: 59 MG/DL

## 2022-03-08 ENCOUNTER — TELEMEDICINE (OUTPATIENT)
Dept: PRIMARY CARE CLINIC | Age: 42
End: 2022-03-08
Payer: COMMERCIAL

## 2022-03-08 DIAGNOSIS — M54.42 ACUTE LEFT-SIDED LOW BACK PAIN WITH LEFT-SIDED SCIATICA: Primary | ICD-10-CM

## 2022-03-08 PROCEDURE — 99213 OFFICE O/P EST LOW 20 MIN: CPT | Performed by: STUDENT IN AN ORGANIZED HEALTH CARE EDUCATION/TRAINING PROGRAM

## 2022-03-08 RX ORDER — METHYLPREDNISOLONE 4 MG/1
TABLET ORAL
Qty: 1 KIT | Refills: 0 | Status: SHIPPED | OUTPATIENT
Start: 2022-03-08 | End: 2022-03-14

## 2022-03-08 ASSESSMENT — ENCOUNTER SYMPTOMS: BACK PAIN: 1

## 2022-03-08 NOTE — PROGRESS NOTES
3/8/2022    TELEHEALTH EVALUATION -- Audio/Visual (During [place] [address] public health emergency)    HPI:    Kylah Huang (:  1980) has requested an audio/video evaluation for the following concern(s):    Back Pain:  Patient complains of a 7 day(s) history of left lower back pain. Pain is aching in nature, with radiation to leg. Pain is persistent, typically moderate in intensity, and is exacerbated by flexion, standing and lifting. Associated symptoms: none. Patient reports the following CPR Red Flags: none. Precipitating factors: lifting/moving heavy object-she was doing chores around the house. Patient's history includes recurrent self limited episodes of low back pain in the past.  Previous treatment: heat, NSAID-ibuprofen. Diagnostic testing: none. Symptoms are improving over time. Review of Systems   Constitutional: Positive for activity change. Genitourinary: Negative for dysuria and frequency. Musculoskeletal: Positive for back pain. Negative for gait problem, myalgias, neck pain and neck stiffness. Neurological: Negative for weakness and numbness. Prior to Visit Medications    Medication Sig Taking? Authorizing Provider   methylPREDNISolone (MEDROL DOSEPACK) 4 MG tablet Take by mouth. Yes Lorriane Jessie, DO   sertraline (ZOLOFT) 100 MG tablet Take 1 tablet by mouth daily  Lorriane Jessie, DO   fluticasone (FLOVENT HFA) 44 MCG/ACT inhaler Inhale 2 puffs into the lungs 2 times daily  Lorriane Jessie, DO   pregabalin (LYRICA) 75 MG capsule Take 1 capsule by mouth 2 times daily for 180 days. Lorriane Jessie, DO   metoprolol tartrate (LOPRESSOR) 50 MG tablet TAKE ONE TABLET BY MOUTH TWICE A DAY  Lorriane Jessie, DO   albuterol sulfate HFA (PROAIR HFA) 108 (90 Base) MCG/ACT inhaler Inhale 2 puffs into the lungs every 6 hours as needed for Wheezing or Shortness of Breath  Lorriane Jessie, DO   LORazepam (ATIVAN) 0.5 MG tablet Take 1 tablet by mouth every 8 hours as needed for Anxiety for up to 30 days.   Jourdan Odom DO Kandace   traZODone (DESYREL) 50 MG tablet Take 1 tablet by mouth 2 times daily  Sonia Miguel DO   famotidine (PEPCID) 20 MG tablet Take 1 tablet by mouth 2 times daily  Sonia Miguel,    omeprazole (PRILOSEC) 40 MG delayed release capsule Take 1 capsule by mouth daily  JACOB Giraldo - CNP   cetirizine (ZYRTEC) 10 MG tablet Take 10 mg by mouth daily. Historical Provider, MD       Social History     Tobacco Use    Smoking status: Never Smoker    Smokeless tobacco: Never Used   Vaping Use    Vaping Use: Never used   Substance Use Topics    Alcohol use: Never     Comment: occ    Drug use: Never        Past Medical History:   Diagnosis Date    Allergic rhinitis     Asthma     Class 3 severe obesity due to excess calories with serious comorbidity and body mass index (BMI) of 45.0 to 49.9 in adult [facility]) 5/13/2021    Depression 5/19/2016    GERD (gastroesophageal reflux disease)     Hypertension 11/1/2017    Major depressive disorder, recurrent episode, moderate with anxious distress ([facility] [address] 11/7/2017    Pyloric stenosis        PHYSICAL EXAMINATION:  There were no vitals taken for this visit.   Wt Readings from Last 3 Encounters:   02/17/22 289 lb 9.6 oz (131.4 kg)   05/17/21 286 lb 6.4 oz (129.9 kg)   05/03/21 285 lb (129.3 kg)       [ INSTRUCTIONS:  \"[x]\" Indicates a positive item  \"[]\" Indicates a negative item  -- DELETE ALL ITEMS NOT EXAMINED]  [x] Alert  [x] Oriented to person/place/time    [x] No apparent distress  []  Appears Unwell:     [x] Breathing appears normal  [] Appears tachypneic      [] Rash on visible skin:    [x] Cranial Nerves II-XII grossly intact    [x] Motor grossly intact in visible upper extremities    [x] Motor grossly intact in visible lower extremities    [x] Normal Mood  [] Anxious appearing    [] Depressed appearing     [] OTHER:      Due to this being a TeleHealth encounter, evaluation of the following organ systems is limited: Vitals/Constitutional/EENT/Resp/CV/GI//MS/Neuro/Skin/Heme-Lymph-Imm. Lab Results   Component Value Date     02/17/2022    K 4.3 02/17/2022     02/17/2022    CO2 25 02/17/2022    BUN 13 02/17/2022    CREATININE 0.7 02/17/2022    GLUCOSE 86 02/17/2022    CALCIUM 9.3 02/17/2022    PROT 7.4 02/17/2022    LABALBU 4.3 02/17/2022    BILITOT 0.4 02/17/2022    ALKPHOS 54 02/17/2022    AST 14 (L) 02/17/2022    ALT 12 02/17/2022    LABGLOM >60 02/17/2022    GFRAA >60 02/17/2022    AGRATIO 1.4 02/17/2022    GLOB 3.0 01/26/2018     Lab Results   Component Value Date    LABA1C 5.7 02/17/2022     Lab Results   Component Value Date    .9 02/17/2022         ASSESSMENT/PLAN:  1. Acute left-sided low back pain with left-sided sciatica: Suggestive of acute low back strain. No red flag symptoms. Explained expected course to patient. Medrol Dosepak as below for sciatica. Okay to continue NSAIDs as taking at home. Counseled on signs and symptoms of more concerning pathology and when to call or present to the office for evaluation.  - methylPREDNISolone (MEDROL DOSEPACK) 4 MG tablet; Take by mouth. Dispense: 1 kit; Refill: 0      Return if symptoms worsen or fail to improve. An  electronic signature was used to authenticate this note. --Sarina Toro DO on 3/8/2022 at 3:23 PM        Pursuant to the emergency declaration under the [facility], [facility]5 waiver authority and the [employer] Act, this Virtual  Visit was conducted, with patient's consent, to reduce the patient's risk of exposure to COVID-19 and provide continuity of care for an established patient. Services were provided through a video synchronous discussion virtually to substitute for in-person clinic visit.

## 2022-03-08 NOTE — PATIENT INSTRUCTIONS
Patient Education        Low Back Arthritis: Exercises  Introduction  Here are some examples of typical rehabilitation exercises for your condition. Start each exercise slowly. Ease off the exercise if you start to have pain. Your doctor or physical therapist will tell you when you can start these exercises and which ones will work best for you. When you are not being active, find a comfortable position for rest. Some people are comfortable on the floor or a medium-firm bed with a small pillow under their head and another under their knees. Some people prefer to lie on their side with a pillow between their knees. Don't stay in one position for too long. Take short walks (10 to 20 minutes) every 2 to 3 hours. Avoid slopes, hills, and stairs until you feel better. Walk only distances you can manage without pain, especially leg pain. How to do the exercises  Pelvic tilt    1. Lie on your back with your knees bent. 2. \"Brace\" your stomach--tighten your muscles by pulling in and imagining your belly button moving toward your spine. 3. Press your lower back into the floor. You should feel your hips and pelvis rock back. 4. Hold for 6 seconds while breathing smoothly. 5. Relax and allow your pelvis and hips to rock forward. 6. Repeat 8 to 12 times. Back stretches    1. Get down on your hands and knees on the floor. 2. Relax your head and allow it to droop. Round your back up toward the ceiling until you feel a nice stretch in your upper, middle, and lower back. Hold this stretch for as long as it feels comfortable, or about 15 to 30 seconds. 3. Return to the starting position with a flat back while you are on your hands and knees. 4. Let your back sway by pressing your stomach toward the floor. Lift your buttocks toward the ceiling. 5. Hold this position for 15 to 30 seconds. 6. Repeat 2 to 4 times. Follow-up care is a key part of your treatment and safety.  Be sure to make and go to all appointments, and call your doctor if you are having problems. It's also a good idea to know your test results and keep a list of the medicines you take. Where can you learn more? Go to https://[employer]. org and sign in to your [employer] account. Enter A948 in the [facility] box to learn more about \"Low Back Arthritis: Exercises. \"     If you do not have an account, please click on the \"Sign Up Now\" link. Current as of: July 1, 2021               Content Version: 13.1  © 9685-4911 Healthwise, Incorporated. Care instructions adapted under license by [facility] ([facility]). If you have questions about a medical condition or this instruction, always ask your healthcare professional. Lenore Bishop any warranty or liability for your use of this information.

## 2022-05-07 DIAGNOSIS — M50.122 CERVICAL DISC DISORDER AT C5-C6 LEVEL WITH RADICULOPATHY: ICD-10-CM

## 2022-05-09 RX ORDER — GABAPENTIN 100 MG/1
CAPSULE ORAL
Qty: 270 CAPSULE | Refills: 1 | Status: SHIPPED | OUTPATIENT
Start: 2022-05-09 | End: 2022-06-08

## 2022-05-09 RX ORDER — CYCLOBENZAPRINE HCL 5 MG
TABLET ORAL
Qty: 60 TABLET | Refills: 5 | Status: SHIPPED | OUTPATIENT
Start: 2022-05-09

## 2022-05-09 NOTE — TELEPHONE ENCOUNTER
Medication:   Requested Prescriptions     Pending Prescriptions Disp Refills    gabapentin (NEURONTIN) 100 MG capsule [Pharmacy Med Name: GABAPENTIN 100 MG CAPSULE] 270 capsule 1     Sig: TAKE ONE CAPSULE BY MOUTH THREE TIMES A DAY        Last Filled:  Discontinued by: Jeanne Real DO on 8/13/2021 09:19      Patient Phone Number: [phone] (home)     Last appt: 3/8/2022   Next appt: 5/8/2022    Last OARRS: No flowsheet data found.

## 2022-05-09 NOTE — TELEPHONE ENCOUNTER
Medication:   Requested Prescriptions     Pending Prescriptions Disp Refills    cyclobenzaprine (FLEXERIL) 5 MG tablet [Pharmacy Med Name: CYCLOBENZAPRINE 5 MG TABLET] 60 tablet 5     Sig: TAKE ONE TABLET BY MOUTH TWICE A DAY AS NEEDED FOR MUSCLE SPASMS        Last Filled:  Discontinued by: Kerwin Roblero DO on 2/17/2022 13:06      Patient Phone Number: [phone] (home)     Last appt: 3/8/2022   Next appt: Visit date not found    Last OARRS: No flowsheet data found.

## 2022-05-31 DIAGNOSIS — F41.1 GENERALIZED ANXIETY DISORDER: ICD-10-CM

## 2022-05-31 DIAGNOSIS — M50.122 CERVICAL DISC DISORDER AT C5-C6 LEVEL WITH RADICULOPATHY: ICD-10-CM

## 2022-06-01 RX ORDER — TRAZODONE HYDROCHLORIDE 50 MG/1
50 TABLET ORAL 2 TIMES DAILY
Qty: 95 TABLET | Refills: 3 | Status: SHIPPED | OUTPATIENT
Start: 2022-06-01 | End: 2022-07-01

## 2022-06-01 RX ORDER — PREGABALIN 75 MG/1
75 CAPSULE ORAL 2 TIMES DAILY
Qty: 180 CAPSULE | Refills: 1 | Status: SHIPPED | OUTPATIENT
Start: 2022-06-01 | End: 2022-08-07 | Stop reason: SDUPTHER

## 2022-06-10 DIAGNOSIS — J45.30 MILD PERSISTENT ASTHMA WITHOUT COMPLICATION: ICD-10-CM

## 2022-06-13 RX ORDER — MONTELUKAST SODIUM 10 MG/1
TABLET ORAL
Qty: 90 TABLET | Refills: 3 | Status: SHIPPED | OUTPATIENT
Start: 2022-06-13

## 2022-08-07 DIAGNOSIS — M50.122 CERVICAL DISC DISORDER AT C5-C6 LEVEL WITH RADICULOPATHY: ICD-10-CM

## 2022-08-08 RX ORDER — PREGABALIN 75 MG/1
75 CAPSULE ORAL 2 TIMES DAILY
Qty: 180 CAPSULE | Refills: 1 | Status: SHIPPED | OUTPATIENT
Start: 2022-08-08 | End: 2023-02-04

## 2022-08-08 NOTE — TELEPHONE ENCOUNTER
Medication:   Requested Prescriptions     Pending Prescriptions Disp Refills    pregabalin (LYRICA) 75 MG capsule 180 capsule 1     Sig: Take 1 capsule by mouth in the morning and 1 capsule before bedtime. Do all this for 180 days. Last Filled:  06/01/22    Patient Phone Number: [phone] (home)     Last appt: 3/8/2022 Return if symptoms worsen or fail to improve. Next appt: Visit date not found    Last OARRS: No flowsheet data found.

## 2022-08-30 ENCOUNTER — TELEMEDICINE (OUTPATIENT)
Dept: PRIMARY CARE CLINIC | Age: 42
End: 2022-08-30
Payer: COMMERCIAL

## 2022-08-30 DIAGNOSIS — E66.01 CLASS 3 SEVERE OBESITY DUE TO EXCESS CALORIES WITH SERIOUS COMORBIDITY AND BODY MASS INDEX (BMI) OF 45.0 TO 49.9 IN ADULT (HCC): ICD-10-CM

## 2022-08-30 DIAGNOSIS — J45.30 MILD PERSISTENT ASTHMA WITHOUT COMPLICATION: ICD-10-CM

## 2022-08-30 DIAGNOSIS — U07.1 COVID: Primary | ICD-10-CM

## 2022-08-30 PROCEDURE — 99214 OFFICE O/P EST MOD 30 MIN: CPT | Performed by: STUDENT IN AN ORGANIZED HEALTH CARE EDUCATION/TRAINING PROGRAM

## 2022-08-30 RX ORDER — ONDANSETRON 4 MG/1
4 TABLET, FILM COATED ORAL DAILY PRN
Qty: 30 TABLET | Refills: 0 | Status: SHIPPED | OUTPATIENT
Start: 2022-08-30

## 2022-08-30 RX ORDER — PREDNISONE 20 MG/1
40 TABLET ORAL DAILY
Qty: 10 TABLET | Refills: 0 | Status: SHIPPED | OUTPATIENT
Start: 2022-08-30 | End: 2022-09-04

## 2022-08-30 RX ORDER — DEXAMETHASONE SODIUM PHOSPHATE 1 MG/ML
SOLUTION/ DROPS OPHTHALMIC
COMMUNITY
Start: 2022-08-25

## 2022-08-30 RX ORDER — CIPROFLOXACIN HYDROCHLORIDE 3.5 MG/ML
SOLUTION/ DROPS TOPICAL
COMMUNITY
Start: 2022-08-21

## 2022-08-30 RX ORDER — LORAZEPAM 0.5 MG/1
TABLET ORAL
COMMUNITY
Start: 2022-08-11 | End: 2022-10-12

## 2022-08-30 SDOH — ECONOMIC STABILITY: FOOD INSECURITY: WITHIN THE PAST 12 MONTHS, YOU WORRIED THAT YOUR FOOD WOULD RUN OUT BEFORE YOU GOT MONEY TO BUY MORE.: NEVER TRUE

## 2022-08-30 SDOH — ECONOMIC STABILITY: FOOD INSECURITY: WITHIN THE PAST 12 MONTHS, THE FOOD YOU BOUGHT JUST DIDN'T LAST AND YOU DIDN'T HAVE MONEY TO GET MORE.: NEVER TRUE

## 2022-08-30 ASSESSMENT — ENCOUNTER SYMPTOMS
VOMITING: 0
COUGH: 1
SORE THROAT: 1
SINUS PRESSURE: 1
SINUS PAIN: 0
CHEST TIGHTNESS: 0
NAUSEA: 1
DIARRHEA: 0
SHORTNESS OF BREATH: 1
RHINORRHEA: 1

## 2022-08-30 ASSESSMENT — SOCIAL DETERMINANTS OF HEALTH (SDOH): HOW HARD IS IT FOR YOU TO PAY FOR THE VERY BASICS LIKE FOOD, HOUSING, MEDICAL CARE, AND HEATING?: NOT HARD AT ALL

## 2022-08-30 NOTE — PROGRESS NOTES
2022       TELEHEALTH EVALUATION -- Audio/Visual (During PEZVV-44 public health emergency)    HPI:    Katelyn Busby (:  1980) has requested an audio/video evaluation for the following concern(s):    COVID:  Katelyn Busby is a 38 y.o. female who presents for evaluation after a positive home COVID test. Symptoms include fevers up to 100.6 degrees, chills, headache, myalgias, productive cough, shortness of breath, sore throat, and fever and have been present for 2 days. She has tried to alleviate the symptoms with acetaminophen with minimal relief. High risk factors for influenza complications: co-morbid illness. Review of Systems   Constitutional:  Positive for chills and fever. HENT:  Positive for congestion, rhinorrhea, sinus pressure and sore throat. Negative for sinus pain. Respiratory:  Positive for cough and shortness of breath. Negative for chest tightness. Cardiovascular:  Negative for chest pain. Gastrointestinal:  Positive for nausea. Negative for diarrhea and vomiting. Musculoskeletal:  Positive for myalgias. Negative for neck pain. Prior to Visit Medications    Medication Sig Taking? Authorizing Provider   LORazepam (ATIVAN) 0.5 MG tablet  Yes Historical Provider, MD   ciprofloxacin (CILOXAN) 0.3 % ophthalmic solution  Yes Historical Provider, MD   ondansetron (ZOFRAN) 4 MG tablet Take 1 tablet by mouth daily as needed for Nausea or Vomiting Yes Blanche Sweet DO   nirmatrelvir/ritonavir (PAXLOVID) 20 x 150 MG & 10 x 100MG TBPK Take 3 tablets (two 150 mg nirmatrelvir and one 100 mg ritonavir tablets) by mouth every 12 hours for 5 days. Yes Blanche Sweet DO   predniSONE (DELTASONE) 20 MG tablet Take 2 tablets by mouth daily for 5 days Yes Blanche Sweet DO   pregabalin (LYRICA) 75 MG capsule Take 1 capsule by mouth in the morning and 1 capsule before bedtime. Do all this for 180 days.  Yes Blanche Sweet DO   montelukast (SINGULAIR) 10 MG tablet TAKE ONE TABLET BY MOUTH ONCE NIGHTLY Yes Claude Sale, DO   cyclobenzaprine (FLEXERIL) 5 MG tablet TAKE ONE TABLET BY MOUTH TWICE A DAY AS NEEDED FOR MUSCLE SPASMS Yes Willaim Amanda MD   sertraline (ZOLOFT) 100 MG tablet Take 1 tablet by mouth daily Yes Claude Sale, DO   fluticasone (FLOVENT HFA) 44 MCG/ACT inhaler Inhale 2 puffs into the lungs 2 times daily Yes Claude Sale, DO   metoprolol tartrate (LOPRESSOR) 50 MG tablet TAKE ONE TABLET BY MOUTH TWICE A DAY Yes Rosharon Sale, DO   albuterol sulfate HFA (PROAIR HFA) 108 (90 Base) MCG/ACT inhaler Inhale 2 puffs into the lungs every 6 hours as needed for Wheezing or Shortness of Breath Yes Claude Sale, DO   famotidine (PEPCID) 20 MG tablet Take 1 tablet by mouth 2 times daily Yes Rosharon Sale, DO   omeprazole (PRILOSEC) 40 MG delayed release capsule Take 1 capsule by mouth daily Yes JACOB Acosta CNP   cetirizine (ZYRTEC) 10 MG tablet Take 10 mg by mouth daily. Yes Historical Provider, MD   dexamethasone (DECADRON) 0.1 % ophthalmic solution   Historical Provider, MD   traZODone (DESYREL) 50 MG tablet Take 1 tablet by mouth 2 times daily  Rosharon Sale, DO   gabapentin (NEURONTIN) 100 MG capsule TAKE ONE CAPSULE BY MOUTH THREE TIMES A DAY  William Amanda MD       Social History     Tobacco Use    Smoking status: Never    Smokeless tobacco: Never   Vaping Use    Vaping Use: Never used   Substance Use Topics    Alcohol use: Never     Comment: occ    Drug use: Never        Past Medical History:   Diagnosis Date    Allergic rhinitis     Asthma     Class 3 severe obesity due to excess calories with serious comorbidity and body mass index (BMI) of 45.0 to 49.9 in adult [facility]) 5/13/2021    Depression 5/19/2016    GERD (gastroesophageal reflux disease)     Hypertension 11/1/2017    Major depressive disorder, recurrent episode, moderate with anxious distress ([facility] [address] 11/7/2017    Pyloric stenosis        PHYSICAL EXAMINATION:  There were no vitals taken for this visit.   Wt Readings from Last 3 Encounters:   02/17/22 289 lb 9.6 oz (131.4 kg)   05/17/21 286 lb 6.4 oz (129.9 kg)   05/03/21 285 lb (129.3 kg)       [ INSTRUCTIONS:  \"[x]\" Indicates a positive item  \"[]\" Indicates a negative item  -- DELETE ALL ITEMS NOT EXAMINED]  [x] Alert  [x] Oriented to person/place/time    [x] No apparent distress  [x]  Appears Unwell: Generally ill-appearing    [x] Breathing appears normal  [x] coughing throughout encounter    [] Rash on visible skin:    [x] Cranial Nerves II-XII grossly intact    [x] Motor grossly intact in visible upper extremities    [x] Motor grossly intact in visible lower extremities    [x] Normal Mood  [] Anxious appearing    [] Depressed appearing     [] OTHER:      Due to this being a TeleHealth encounter, evaluation of the following organ systems is limited: Vitals/Constitutional/EENT/Resp/CV/GI//MS/Neuro/Skin/Heme-Lymph-Imm. Lab Results   Component Value Date     02/17/2022    K 4.3 02/17/2022     02/17/2022    CO2 25 02/17/2022    BUN 13 02/17/2022    CREATININE 0.7 02/17/2022    GLUCOSE 86 02/17/2022    CALCIUM 9.3 02/17/2022    PROT 7.4 02/17/2022    LABALBU 4.3 02/17/2022    BILITOT 0.4 02/17/2022    ALKPHOS 54 02/17/2022    AST 14 (L) 02/17/2022    ALT 12 02/17/2022    LABGLOM >60 02/17/2022    GFRAA >60 02/17/2022    AGRATIO 1.4 02/17/2022    GLOB 3.0 01/26/2018     Lab Results   Component Value Date    LABA1C 5.7 02/17/2022     Lab Results   Component Value Date    .9 02/17/2022         ASSESSMENT/PLAN:  1. COVID: Asthma and obesity increases risk of severe disease, so will start Paxlovid as below. Explained supportive care to patient. Counseled symptom on signs and symptoms of worsening infection and when to call or present to the office for evaluation. - nirmatrelvir/ritonavir (PAXLOVID) 20 x 150 MG & 10 x 100MG TBPK; Take 3 tablets (two 150 mg nirmatrelvir and one 100 mg ritonavir tablets) by mouth every 12 hours for 5 days. Dispense: 30 tablet;  Refill: 0    2. Mild persistent asthma without complication: Having some wheezing and shortness of breath. Prednisone as below. Will have patient message on Friday to make sure she is feeling better. - predniSONE (DELTASONE) 20 MG tablet; Take 2 tablets by mouth daily for 5 days  Dispense: 10 tablet; Refill: 0    3. Class 3 severe obesity due to excess calories with serious comorbidity and body mass index (BMI) of 45.0 to 49.9 in [facility]): Increases risk of progression to severe COVID infection. Return if symptoms worsen or fail to improve. Nelly Dallas, was evaluated through a synchronous (real-time) audio-video encounter. The patient (or guardian if applicable) is aware that this is a billable service, which includes applicable co-pays. This Virtual Visit was conducted with patient's (and/or legal guardian's) consent. The visit was conducted pursuant to the emergency declaration under the [address] [place] [address], [address] [place] [address] authority and the [employer] and [employer] Act. Patient identification was verified, and a caregiver was present when appropriate. The patient was located at Home: Tala Krishna [address] [place] [id number]. Provider was located at [place] [address] (Appt Dept): [address] [place] [address] [place] [address]. Total time spent for this encounter: Not billed by time    --Maggy Hughes DO on 8/30/2022 at 12:12 PM    An electronic signature was used to authenticate this note. An  electronic signature was used to authenticate this note.     --Maggy Hughes DO on 8/30/2022 at 12:12 PM        Pursuant to the emergency declaration under the [address] [place] [address], [facility] authority and the [employer] and [employer] Act, this Virtual  Visit was conducted, with patient's consent, to reduce the patient's risk of exposure to COVID-19 and provide continuity of care for an established patient. Services were provided through a video synchronous discussion virtually to substitute for in-person clinic visit.

## 2022-09-02 ENCOUNTER — PATIENT MESSAGE (OUTPATIENT)
Dept: PRIMARY CARE CLINIC | Age: 42
End: 2022-09-02

## 2022-09-05 RX ORDER — BENZONATATE 100 MG/1
100-200 CAPSULE ORAL 3 TIMES DAILY PRN
Qty: 60 CAPSULE | Refills: 0 | Status: SHIPPED | OUTPATIENT
Start: 2022-09-05 | End: 2022-09-12

## 2022-09-10 ENCOUNTER — PATIENT MESSAGE (OUTPATIENT)
Dept: PRIMARY CARE CLINIC | Age: 42
End: 2022-09-10

## 2022-09-12 RX ORDER — METHYLPREDNISOLONE 4 MG/1
TABLET ORAL
Qty: 1 KIT | Refills: 0 | Status: SHIPPED | OUTPATIENT
Start: 2022-09-12 | End: 2022-09-18

## 2022-09-12 NOTE — TELEPHONE ENCOUNTER
From: Kingston Davis  To: Dr. Archie Lema  Sent: 9/10/2022 12:43 PM EDT  Subject: Surendra Covid     Good afternoon,     Wanted to see what I need to do. I have tested positive for covid again.  (slaps forehead). I have the stuffy and runny nose, slightly sore throat and a cough. I chest has been tight and I'm winded and using my inhaler.      Thanks Ken Cabello

## 2022-09-17 DIAGNOSIS — I10 ESSENTIAL HYPERTENSION: ICD-10-CM

## 2022-09-17 DIAGNOSIS — U07.1 COVID: ICD-10-CM

## 2022-09-17 DIAGNOSIS — J45.30 MILD PERSISTENT ASTHMA WITHOUT COMPLICATION: ICD-10-CM

## 2022-09-19 RX ORDER — NIRMATRELVIR AND RITONAVIR 300-100 MG
KIT ORAL
Qty: 30 TABLET | Refills: 0 | OUTPATIENT
Start: 2022-09-19

## 2022-09-19 RX ORDER — BENZONATATE 100 MG/1
CAPSULE ORAL
Qty: 60 CAPSULE | Refills: 0 | OUTPATIENT
Start: 2022-09-19

## 2022-09-19 RX ORDER — ONDANSETRON 4 MG/1
TABLET, FILM COATED ORAL
Qty: 30 TABLET | Refills: 0 | OUTPATIENT
Start: 2022-09-19

## 2022-09-19 RX ORDER — PREDNISONE 20 MG/1
TABLET ORAL
Qty: 10 TABLET | Refills: 0 | OUTPATIENT
Start: 2022-09-19

## 2022-09-19 RX ORDER — METOPROLOL TARTRATE 50 MG/1
TABLET, FILM COATED ORAL
Qty: 180 TABLET | Refills: 1 | OUTPATIENT
Start: 2022-09-19

## 2022-09-19 RX ORDER — LORAZEPAM 0.5 MG/1
TABLET ORAL
Qty: 15 TABLET | OUTPATIENT
Start: 2022-09-19

## 2022-10-11 DIAGNOSIS — F41.1 GENERALIZED ANXIETY DISORDER: Primary | ICD-10-CM

## 2022-10-12 RX ORDER — LORAZEPAM 0.5 MG/1
TABLET ORAL
Qty: 15 TABLET | Refills: 5 | Status: SHIPPED | OUTPATIENT
Start: 2022-10-12 | End: 2023-04-10

## 2022-10-12 NOTE — TELEPHONE ENCOUNTER
Medication:   Requested Prescriptions     Pending Prescriptions Disp Refills    LORazepam (ATIVAN) 0.5 MG tablet [Pharmacy Med Name: LORAZEPAM 0.5 MG TABLET] 15 tablet      Sig: TAKE ONE TABLET BY MOUTH EVERY 8 HOURS AS NEEDED FOR ANXIETY        Last Filled:    08/11/22  Patient Phone Number: [phone] (home)     Last appt: 8/30/2022 Return if symptoms worsen or fail to improve. Next appt: Visit date not found    Last OARRS: No flowsheet data found.

## 2022-10-26 DIAGNOSIS — I10 ESSENTIAL HYPERTENSION: ICD-10-CM

## 2022-10-27 RX ORDER — METOPROLOL TARTRATE 50 MG/1
TABLET, FILM COATED ORAL
Qty: 180 TABLET | Refills: 1 | Status: SHIPPED | OUTPATIENT
Start: 2022-10-27

## 2022-10-27 NOTE — TELEPHONE ENCOUNTER
Medication:   Requested Prescriptions     Pending Prescriptions Disp Refills    metoprolol tartrate (LOPRESSOR) 50 MG tablet [Pharmacy Med Name: METOPROLOL TARTRATE 50 MG TAB] 180 tablet 1     Sig: TAKE ONE TABLET BY MOUTH TWICE A DAY       Last Filled:      Patient Phone Number: [phone] (home)     Last appt: 8/30/2022   Next appt: Visit date not found      Return if symptoms worsen or fail to improve.   Last PSA: No results found for: PSA

## 2022-11-21 NOTE — PROGRESS NOTES
2022       TELEHEALTH EVALUATION -- Audio/Visual (During GFIIN-72 public health emergency)    HPI:    José Ramos (:  1980) has requested an audio/video evaluation for the following concern(s):    ***    Review of Systems    Prior to Visit Medications    Medication Sig Taking? Authorizing Provider   metoprolol tartrate (LOPRESSOR) 50 MG tablet TAKE ONE TABLET BY MOUTH TWICE A DAY  Craig Jeronimo DO   LORazepam (ATIVAN) 0.5 MG tablet TAKE ONE TABLET BY MOUTH EVERY 8 HOURS AS NEEDED FOR ANXIETY  Kit Sandifer, DO   dexamethasone (DECADRON) 0.1 % ophthalmic solution   Historical Provider, MD   ciprofloxacin (CILOXAN) 0.3 % ophthalmic solution   Historical Provider, MD   ondansetron (ZOFRAN) 4 MG tablet Take 1 tablet by mouth daily as needed for Nausea or Vomiting  Kit Sandifer, DO   pregabalin (LYRICA) 75 MG capsule Take 1 capsule by mouth in the morning and 1 capsule before bedtime. Do all this for 180 days.   Kit Sandifer, DO   montelukast (SINGULAIR) 10 MG tablet TAKE ONE TABLET BY MOUTH ONCE NIGHTLY  Kit Sandifer, DO   traZODone (DESYREL) 50 MG tablet Take 1 tablet by mouth 2 times daily  Kit Sandifer, DO   gabapentin (NEURONTIN) 100 MG capsule TAKE ONE CAPSULE BY MOUTH THREE TIMES A DAY  Lea Ramirez MD   cyclobenzaprine (FLEXERIL) 5 MG tablet TAKE ONE TABLET BY MOUTH TWICE A DAY AS NEEDED FOR MUSCLE SPASMS  Lea Ramirez MD   sertraline (ZOLOFT) 100 MG tablet Take 1 tablet by mouth daily  Kit Sandifer, DO   fluticasone (FLOVENT HFA) 44 MCG/ACT inhaler Inhale 2 puffs into the lungs 2 times daily  Kit Sandifer, DO   albuterol sulfate HFA (PROAIR HFA) 108 (90 Base) MCG/ACT inhaler Inhale 2 puffs into the lungs every 6 hours as needed for Wheezing or Shortness of Breath  Kit Sandifer, DO   famotidine (PEPCID) 20 MG tablet Take 1 tablet by mouth 2 times daily  Kit Sandifer, DO   omeprazole (PRILOSEC) 40 MG delayed release capsule Take 1 capsule by mouth daily  Iveth Shantel, APRN - CNP   cetirizine (ZYRTEC) 10 MG tablet Take 10 mg by mouth daily. Historical Provider, MD       Social History     Tobacco Use    Smoking status: Never    Smokeless tobacco: Never   Vaping Use    Vaping Use: Never used   Substance Use Topics    Alcohol use: Never     Comment: occ    Drug use: Never        {F2 for Optional History SmartBlocks:09559}    PHYSICAL EXAMINATION:  There were no vitals taken for this visit. Wt Readings from Last 3 Encounters:   02/17/22 289 lb 9.6 oz (131.4 kg)   05/17/21 286 lb 6.4 oz (129.9 kg)   05/03/21 285 lb (129.3 kg)       [ INSTRUCTIONS:  \"[x]\" Indicates a positive item  \"[]\" Indicates a negative item  -- DELETE ALL ITEMS NOT EXAMINED]  [] Alert  [] Oriented to person/place/time    [] No apparent distress  []  Appears Unwell:     [] Breathing appears normal  [] Appears tachypneic      [] Rash on visible skin:    [] Cranial Nerves II-XII grossly intact    [] Motor grossly intact in visible upper extremities    [] Motor grossly intact in visible lower extremities    [] Normal Mood  [] Anxious appearing    [] Depressed appearing     [] OTHER:      Due to this being a TeleHealth encounter, evaluation of the following organ systems is limited: Vitals/Constitutional/EENT/Resp/CV/GI//MS/Neuro/Skin/Heme-Lymph-Imm. Lab Results   Component Value Date     02/17/2022    K 4.3 02/17/2022     02/17/2022    CO2 25 02/17/2022    BUN 13 02/17/2022    CREATININE 0.7 02/17/2022    GLUCOSE 86 02/17/2022    CALCIUM 9.3 02/17/2022    PROT 7.4 02/17/2022    LABALBU 4.3 02/17/2022    BILITOT 0.4 02/17/2022    ALKPHOS 54 02/17/2022    AST 14 (L) 02/17/2022    ALT 12 02/17/2022    LABGLOM >60 02/17/2022    GFRAA >60 02/17/2022    AGRATIO 1.4 02/17/2022    GLOB 3.0 01/26/2018     Lab Results   Component Value Date    LABA1C 5.7 02/17/2022     Lab Results   Component Value Date    .9 02/17/2022         ASSESSMENT/PLAN:  There are no diagnoses linked to this encounter. No follow-ups on file.   Peg Santiago Edilson Berry, was evaluated through a synchronous (real-time) audio-video encounter. The patient (or guardian if applicable) is aware that this is a billable service, which includes applicable co-pays. This Virtual Visit was conducted with patient's (and/or legal guardian's) consent. The visit was conducted pursuant to the emergency declaration under the [address] [place] [address] authority and the [employer] and [employer] Act. Patient identification was verified, and a caregiver was present when appropriate. The patient was located at {[facility] POS - Patient:[id number]}. Provider was located at {[facility] POS - Provider:[id number]}. Total time spent for this encounter: {Time Spent:56592132}    --Stephane Ramos DO on 11/21/2022 at 6:16 PM    An electronic signature was used to authenticate this note. An  electronic signature was used to authenticate this note. --Stephane Ramos DO on 11/21/2022 at 6:16 PM    {Coding Help -- Use CPT 09178-25404 with EM elements or Time rules for Office Visits.:42236}    Pursuant to the emergency declaration under the [address] [place] [address] authority and the Coronavirus Preparedness and [employer] Act, this Virtual  Visit was conducted, with patient's consent, to reduce the patient's risk of exposure to COVID-19 and provide continuity of care for an established patient. Services were provided through a video synchronous discussion virtually to substitute for in-person clinic visit.

## 2022-11-22 ENCOUNTER — OFFICE VISIT (OUTPATIENT)
Dept: PRIMARY CARE CLINIC | Age: 42
End: 2022-11-22
Payer: COMMERCIAL

## 2022-11-22 VITALS
OXYGEN SATURATION: 94 % | DIASTOLIC BLOOD PRESSURE: 86 MMHG | WEIGHT: 284 LBS | BODY MASS INDEX: 45.64 KG/M2 | HEART RATE: 117 BPM | HEIGHT: 66 IN | TEMPERATURE: 97 F | SYSTOLIC BLOOD PRESSURE: 132 MMHG

## 2022-11-22 DIAGNOSIS — J45.901 MILD ASTHMA EXACERBATION: Primary | ICD-10-CM

## 2022-11-22 PROCEDURE — 3078F DIAST BP <80 MM HG: CPT | Performed by: STUDENT IN AN ORGANIZED HEALTH CARE EDUCATION/TRAINING PROGRAM

## 2022-11-22 PROCEDURE — 3074F SYST BP LT 130 MM HG: CPT | Performed by: STUDENT IN AN ORGANIZED HEALTH CARE EDUCATION/TRAINING PROGRAM

## 2022-11-22 PROCEDURE — 99214 OFFICE O/P EST MOD 30 MIN: CPT | Performed by: STUDENT IN AN ORGANIZED HEALTH CARE EDUCATION/TRAINING PROGRAM

## 2022-11-22 RX ORDER — DOXYCYCLINE HYCLATE 100 MG
100 TABLET ORAL 2 TIMES DAILY
Qty: 14 TABLET | Refills: 0 | Status: SHIPPED | OUTPATIENT
Start: 2022-11-22 | End: 2022-11-29

## 2022-11-22 RX ORDER — PREDNISONE 20 MG/1
40 TABLET ORAL DAILY
Qty: 10 TABLET | Refills: 0 | Status: SHIPPED | OUTPATIENT
Start: 2022-11-22 | End: 2022-11-27

## 2022-11-22 RX ORDER — BENZONATATE 200 MG/1
200 CAPSULE ORAL 3 TIMES DAILY PRN
Qty: 30 CAPSULE | Refills: 0 | Status: SHIPPED | OUTPATIENT
Start: 2022-11-22 | End: 2022-11-29

## 2022-11-22 ASSESSMENT — ENCOUNTER SYMPTOMS
SINUS PRESSURE: 0
WHEEZING: 0
CHEST TIGHTNESS: 1
SINUS PAIN: 0
SHORTNESS OF BREATH: 1
RHINORRHEA: 1
SORE THROAT: 1
COUGH: 1

## 2022-12-10 DIAGNOSIS — J45.30 MILD PERSISTENT ASTHMA WITHOUT COMPLICATION: ICD-10-CM

## 2022-12-12 RX ORDER — ALBUTEROL SULFATE 90 UG/1
AEROSOL, METERED RESPIRATORY (INHALATION)
Qty: 25.5 G | Refills: 1 | Status: SHIPPED | OUTPATIENT
Start: 2022-12-12

## 2022-12-12 NOTE — TELEPHONE ENCOUNTER
Medication:   Requested Prescriptions     Pending Prescriptions Disp Refills    albuterol sulfate HFA (PROVENTIL;VENTOLIN;PROAIR) 108 (90 Base) MCG/ACT inhaler [Pharmacy Med Name: ALBUTEROL HFA 90 MCG INHALER] 25.5 g      Sig: INHALE TWO PUFFS BY MOUTH EVERY 6 HOURS AS NEEDED FOR WHEEZING OR SHORTNESS OF BREATH        Last Filled:  2/17/22    Patient Phone Number: [phone] (home)     Last appt: 11/22/2022   Next appt: Visit date not found    Last OARRS: No flowsheet data found.

## 2022-12-27 DIAGNOSIS — F41.1 GENERALIZED ANXIETY DISORDER: ICD-10-CM

## 2022-12-28 RX ORDER — TRAZODONE HYDROCHLORIDE 50 MG/1
TABLET ORAL
Qty: 95 TABLET | Refills: 3 | Status: SHIPPED | OUTPATIENT
Start: 2022-12-28

## 2022-12-28 NOTE — TELEPHONE ENCOUNTER
Medication:   Requested Prescriptions     Pending Prescriptions Disp Refills    traZODone (DESYREL) 50 MG tablet [Pharmacy Med Name: traZODone 50 MG TABLET] 95 tablet 3     Sig: TAKE ONE TABLET BY MOUTH TWICE A DAY        Last Filled:  06/01/2022    Patient Phone Number: [phone] (home)     Last appt: 11/22/2022 Return if symptoms worsen or fail to improve. Next appt: Visit date not found    Last OARRS: No flowsheet data found.

## 2023-01-15 DIAGNOSIS — M50.122 CERVICAL DISC DISORDER AT C5-C6 LEVEL WITH RADICULOPATHY: ICD-10-CM

## 2023-01-16 RX ORDER — PREGABALIN 75 MG/1
75 CAPSULE ORAL 2 TIMES DAILY
Qty: 180 CAPSULE | Refills: 1 | Status: SHIPPED | OUTPATIENT
Start: 2023-01-16 | End: 2023-07-15

## 2023-01-16 NOTE — TELEPHONE ENCOUNTER
Medication:   Requested Prescriptions     Pending Prescriptions Disp Refills    pregabalin (LYRICA) 75 MG capsule 180 capsule 1     Sig: Take 1 capsule by mouth 2 times daily for 180 days. Last Filled:  08/08/2022    Patient Phone Number: [phone] (home)     Last appt: 11/22/2022   Next appt: Visit date not found    Last OARRS: No flowsheet data found.

## 2023-01-19 RX ORDER — FAMOTIDINE 20 MG/1
TABLET, FILM COATED ORAL
Qty: 60 TABLET | Refills: 5 | Status: SHIPPED | OUTPATIENT
Start: 2023-01-19

## 2023-01-19 NOTE — TELEPHONE ENCOUNTER
Medication:   Requested Prescriptions     Pending Prescriptions Disp Refills    famotidine (PEPCID) 20 MG tablet [Pharmacy Med Name: FAMOTIDINE 20 MG TABLET] 60 tablet 5     Sig: TAKE ONE TABLET BY MOUTH TWICE A DAY        Last Filled:  10/13/21    Patient Phone Number: [phone] (home)     Last appt: 11/22/2022     Return if symptoms worsen or fail to improve. Next appt: Visit date not found    Last OARRS: No flowsheet data found.

## 2023-03-07 SDOH — ECONOMIC STABILITY: TRANSPORTATION INSECURITY
IN THE PAST 12 MONTHS, HAS LACK OF TRANSPORTATION KEPT YOU FROM MEETINGS, WORK, OR FROM GETTING THINGS NEEDED FOR DAILY LIVING?: NO

## 2023-03-07 SDOH — ECONOMIC STABILITY: FOOD INSECURITY: WITHIN THE PAST 12 MONTHS, YOU WORRIED THAT YOUR FOOD WOULD RUN OUT BEFORE YOU GOT MONEY TO BUY MORE.: NEVER TRUE

## 2023-03-07 SDOH — ECONOMIC STABILITY: INCOME INSECURITY: HOW HARD IS IT FOR YOU TO PAY FOR THE VERY BASICS LIKE FOOD, HOUSING, MEDICAL CARE, AND HEATING?: NOT VERY HARD

## 2023-03-07 SDOH — ECONOMIC STABILITY: HOUSING INSECURITY
IN THE LAST 12 MONTHS, WAS THERE A TIME WHEN YOU DID NOT HAVE A STEADY PLACE TO SLEEP OR SLEPT IN A SHELTER (INCLUDING NOW)?: NO

## 2023-03-07 SDOH — ECONOMIC STABILITY: FOOD INSECURITY: WITHIN THE PAST 12 MONTHS, THE FOOD YOU BOUGHT JUST DIDN'T LAST AND YOU DIDN'T HAVE MONEY TO GET MORE.: NEVER TRUE

## 2023-03-07 NOTE — PATIENT INSTRUCTIONS
Patient Education        Well Visit, Ages [address] [facility] 9 to 72: Care Instructions  Well visits can help you stay healthy. Your doctor has checked your overall health and may have suggested ways to take good care of yourself. Your doctor also may have recommended tests. You can help prevent illness with healthy eating, good sleep, vaccinations, regular exercise, and other steps. Get the tests that you and your doctor decide on. Depending on your age and risks, examples might include screening for diabetes; hepatitis C; HIV; and cervical, breast, lung, and colon cancer. Screening helps find diseases before any symptoms appear. Eat healthy foods. Choose fruits, vegetables, whole grains, lean protein, and low-fat dairy foods. Limit saturated fat and reduce salt. Limit alcohol. Men should have no more than 2 drinks a day. Women should have no more than 1. For some people, no alcohol is the best choice. Exercise. Get at least 30 minutes of exercise on most days of the week. Walking can be a good choice. Reach and stay at your healthy weight. This will lower your risk for many health problems. Take care of your mental health. Try to stay connected with friends, family, and community, and find ways to manage stress. If you're feeling depressed or hopeless, talk to someone. A counselor can help. If you don't have a counselor, talk to your doctor. Talk to your doctor if you think you may have a problem with alcohol or drug use. This includes prescription medicines and illegal drugs. Avoid tobacco and nicotine: Don't smoke, vape, or chew. If you need help quitting, talk to your doctor. Practice safer sex. Getting tested, using condoms or dental dams, and limiting sex partners can help prevent STIs. Use birth control if it's important to you to prevent pregnancy. Talk with your doctor about your choices and what might be best for you. Prevent problems where you can.  Protect your skin from too much sun, wash your hands, brush your teeth twice a day, and wear a seat belt in the car. Where can you learn more? Go to http://www.licea.com/ and enter P072 to learn more about \"Well Visit, Ages 25 to 72: Care Instructions. \"  Current as of: March 9, 2022               Content Version: 13.5  © 3306-3103 [employer]. Care instructions adapted under license by [facility] ([facility]). If you have questions about a medical condition or this instruction, always ask your healthcare professional. [address] any warranty or liability for your use of this information.

## 2023-03-07 NOTE — PROGRESS NOTES
3/8/2023    Bhupendra Mac (:  1980) is a 38 y.o. female, here for evaluation of the following medical concerns:    HPI    Well Adult Physical: Patient here for a comprehensive physical exam.The patient reports no problems  Do you take any herbs or supplements that were not prescribed by a doctor? no Are you taking calcium supplements? not applicable Are you taking aspirin daily? not applicable    Sexual activity: has sex with males - condoms  Diet: Unhealthy  Exercise: no regular exercise  Seatbelt use: yes    Review of Systems   Constitutional:  Negative for activity change, appetite change, fatigue and unexpected weight change. HENT:  Negative for hearing loss. Eyes:  Negative for visual disturbance. Respiratory:  Negative for chest tightness, shortness of breath and stridor. Cardiovascular:  Negative for chest pain and palpitations. Gastrointestinal:  Negative for abdominal pain, blood in stool, constipation and diarrhea. Endocrine: Negative for polydipsia, polyphagia and polyuria. Genitourinary:  Negative for dysuria, genital sores, menstrual problem, pelvic pain, vaginal bleeding, vaginal discharge and vaginal pain. Musculoskeletal:  Positive for neck pain (chronic, unchanged. Takes Gabapentin for cervicalgia.). Negative for arthralgias and back pain. Skin:  Negative for rash. Allergic/Immunologic: Negative for environmental allergies. Neurological:  Negative for dizziness, syncope and headaches. Hematological:  Negative for adenopathy. Psychiatric/Behavioral:  Positive for sleep disturbance (insomnia - would like to increase trazodone). Negative for dysphoric mood. The patient is not nervous/anxious. Prior to Visit Medications    Medication Sig Taking?  Authorizing Provider   fluticasone (FLOVENT HFA) 44 MCG/ACT inhaler Inhale 2 puffs into the lungs 2 times daily Yes Annette Pham, DO   sertraline (ZOLOFT) 100 MG tablet Take 1.5 tablets by mouth daily Yes Wellington Sweet Kandace, DO   LORazepam (ATIVAN) 0.5 MG tablet TAKE ONE TABLET BY MOUTH EVERY 8 HOURS AS NEEDED FOR ANXIETY Yes Gwendolynn First, DO   montelukast (SINGULAIR) 10 MG tablet TAKE ONE TABLET BY MOUTH ONCE NIGHTLY Yes Gwendolynn First, DO   metoprolol tartrate (LOPRESSOR) 50 MG tablet Take 1 tablet by mouth 2 times daily Yes Gwendolynn First, DO   traZODone (DESYREL) 100 MG tablet Take 1 tablet by mouth nightly Yes Gwendolynn First, DO   gabapentin (NEURONTIN) 300 MG capsule Take 1 capsule by mouth 3 times daily for 30 days. Yes Gwendolynn First, DO   famotidine (PEPCID) 20 MG tablet TAKE ONE TABLET BY MOUTH TWICE A DAY Yes Gwendolynn First, DO   traZODone (DESYREL) 50 MG tablet TAKE ONE TABLET BY MOUTH TWICE A DAY Yes Gwendolynn First, DO   albuterol sulfate HFA (PROVENTIL;VENTOLIN;PROAIR) 108 (90 Base) MCG/ACT inhaler INHALE TWO PUFFS BY MOUTH EVERY 6 HOURS AS NEEDED FOR WHEEZING OR SHORTNESS OF BREATH Yes Gwendolynn First, DO   cyclobenzaprine (FLEXERIL) 5 MG tablet TAKE ONE TABLET BY MOUTH TWICE A DAY AS NEEDED FOR MUSCLE SPASMS Yes Leha Dickey MD   omeprazole (PRILOSEC) 40 MG delayed release capsule Take 1 capsule by mouth daily Yes JACOB Barrow - CNP   cetirizine (ZYRTEC) 10 MG tablet Take 10 mg by mouth daily.  Yes Historical Provider, MD        Allergies   Allergen Reactions    Azithromycin      Rash, itching, headache    Desvenlafaxine      Suicidal ideation    Fexofenadine-Pseudoephed Er      The decongestant part    Penicillins     Sulfa Antibiotics        Past Medical History:   Diagnosis Date    Allergic rhinitis     Asthma     Class 3 severe obesity due to excess calories with serious comorbidity and body mass index (BMI) of 45.0 to 49.9 in adult [facility]) 05/13/2021    Depression 05/19/2016    GERD (gastroesophageal reflux disease)     Headache 2/13/2016    Hypertension 11/01/2017    Major depressive disorder, recurrent episode, moderate with anxious distress ([facility] [address] 11/07/2017    Pyloric stenosis        Past Surgical History:   Procedure Laterality Date    WISDOM TOOTH EXTRACTION  early 19's    x 4       Social History     Socioeconomic History    Marital status: Unknown     Spouse name: Not on file    Number of children: Not on file    Years of education: Not on file    Highest education level: Not on file   Occupational History    Not on file   Tobacco Use    Smoking status: Never    Smokeless tobacco: Never    Tobacco comments:     Never   Vaping Use    Vaping Use: Never used   Substance and Sexual Activity    Alcohol use: No     Comment: occ    Drug use: Never    Sexual activity: Yes     Partners: Male   Other Topics Concern    Not on file   Social History Narrative    Not on file     Social Determinants of Health     Financial Resource Strain: Low Risk     Difficulty of Paying Living Expenses: Not hard at all   Food Insecurity: No Food Insecurity    Worried About Running Out of Food in the Last Year: Never true    Ran Out of Food in the Last Year: Never true   Transportation Needs: Not on file   Physical Activity: Not on file   Stress: Not on file   Social Connections: Not on file   Intimate Partner Violence: Not on file   Housing Stability: Not on file        Family History   Problem Relation Age of Onset    Diabetes Mother     High Cholesterol Mother     Asthma Mother     Cancer Father     Alcohol Abuse Father     Heart Disease Maternal Grandmother        Vitals:    03/08/23 0918 03/08/23 0931   BP: (!) 154/86 132/76   Pulse: 90    Temp: 98.4 °F (36.9 °C)    TempSrc: Temporal    Weight: 292 lb 12.8 oz (132.8 kg)    Height: 5' 6\" (1.676 m)      Estimated body mass index is 47.26 kg/m² as calculated from the following:    Height as of this encounter: 5' 6\" (1.676 m). Weight as of this encounter: 292 lb 12.8 oz (132.8 kg). Physical Exam  Vitals reviewed. Constitutional:       Appearance: Normal appearance. She is obese. HENT:      Head: Normocephalic and atraumatic.       Right Ear: Tympanic membrane, ear canal and external ear normal. Left Ear: Tympanic membrane, ear canal and external ear normal.      Nose: Nose normal.      Mouth/Throat:      Mouth: Mucous membranes are moist.      Pharynx: Oropharynx is clear. Eyes:      Extraocular Movements: Extraocular movements intact. Conjunctiva/sclera: Conjunctivae normal.   Cardiovascular:      Rate and Rhythm: Normal rate and regular rhythm. Pulses: Normal pulses. Heart sounds: Normal heart sounds. Pulmonary:      Effort: Pulmonary effort is normal.      Breath sounds: Normal breath sounds. Abdominal:      General: Abdomen is flat. Bowel sounds are normal.      Palpations: Abdomen is soft. Musculoskeletal:         General: Normal range of motion. Cervical back: Neck supple. No tenderness. Lymphadenopathy:      Cervical: No cervical adenopathy. Skin:     General: Skin is warm and dry. Capillary Refill: Capillary refill takes less than 2 seconds. Findings: No rash. Neurological:      General: No focal deficit present. Mental Status: She is alert and oriented to person, place, and time. Mental status is at baseline. Psychiatric:         Mood and Affect: Mood normal.         Behavior: Behavior normal.         Thought Content: Thought content normal.         Judgment: Judgment normal.       Separate Identifiable issues addressed today:      ASSESSMENT/PLAN:  Sena West was seen today for annual exam.    Diagnoses and all orders for this visit:    Routine general medical examination at a health care facility: Reviewed vitals and within normal limits. BMI is obese. Depression screen negative. Reviewed diet and exercise regimen with patient and recommended appropriate lifestyle modifications. Essential hypertension: Blood pressure at goal today. Tolerating current regimen well without side effects. Refills given. Routine follow up in 6 months.     BP Readings from Last 3 Encounters:   03/08/23 132/76   11/22/22 132/86   02/17/22 134/78     - metoprolol tartrate (LOPRESSOR) 50 MG tablet; Take 1 tablet by mouth 2 times daily  -     Comprehensive Metabolic Panel; Future    Mixed hyperlipidemia: Most recent lipid panel elevated, but does not meet criteria for statin. Discussed appropriate lifestyle modifications for patient and gave information on the Mediterranean diet. Repeat lipid panel 12 months. -     Lipid, Fasting; Future    The 10-year ASCVD risk score (John PAIGE, et al., 2019) is: 2.3%    Values used to calculate the score:      Age: 38 years      Sex: Female      Is Non- : No      Diabetic: No      Tobacco smoker: No      Systolic Blood Pressure: 055 mmHg      Is BP treated: Yes      HDL Cholesterol: 34 mg/dL      Total Cholesterol: 197 mg/dL    Mild persistent asthma without complication: Minimally symptomatic. Complaint with their preventative inhaler. Infrequent albuterol use. Most recent exacerbation is distant. Okay for refill and routine follow-up in 6 months.   -     fluticasone (FLOVENT HFA) 44 MCG/ACT inhaler; Inhale 2 puffs into the lungs 2 times daily  -     montelukast (SINGULAIR) 10 MG tablet; TAKE ONE TABLET BY MOUTH ONCE NIGHTLY    Mild single current episode of major depressive disorder ([facility] [address]): Tolerating current regimen well without side effects. Feels symptoms are well managed. Briefly discussed weaning medication today, but patient would like to continue unchanged. -     sertraline (ZOLOFT) 100 MG tablet; Take 1.5 tablets by mouth daily    Generalized anxiety disorder with panic attacks: As above. OK for refill of ativan. -     sertraline (ZOLOFT) 100 MG tablet; Take 1.5 tablets by mouth daily  -     LORazepam (ATIVAN) 0.5 MG tablet; TAKE ONE TABLET BY MOUTH EVERY 8 HOURS AS NEEDED FOR ANXIETY    Insomnia due to medical condition: Reiterated appropriate lifestyle modifications with patient. Okay to go up to 150 or 200 on trazodone if needed. -     traZODone (DESYREL) 100 MG tablet;  Take 1 tablet by mouth nightly    Class 3 severe obesity due to excess calories with serious comorbidity and body mass index (BMI) of 45.0 to 49.9 in adult [facility]): Complicates all aspects of the patient care. Reviewed appropriate lifestyle modifications with patient. Wt Readings from Last 3 Encounters:   03/08/23 292 lb 12.8 oz (132.8 kg)   11/22/22 284 lb (128.8 kg)   02/17/22 289 lb 9.6 oz (131.4 kg)     Screening for diabetes mellitus  -     Hemoglobin A1C; Future    Screening for cervical cancer: Pap smear scheduled for later today. Need for prophylactic vaccination against Streptococcus pneumoniae (pneumococcus)  -     Pneumococcal, PPSV23, PNEUMOVAX 23, (age 2 yrs+), SC/IM    Return in about 6 months (around 9/8/2023) for Blood Pressure. An  electronic signature was used to authenticate this note.     --Husam Mancuso DO on 3/8/2023 at 9:50 AM

## 2023-03-08 ENCOUNTER — OFFICE VISIT (OUTPATIENT)
Dept: PRIMARY CARE CLINIC | Age: 43
End: 2023-03-08
Payer: COMMERCIAL

## 2023-03-08 VITALS
TEMPERATURE: 98.4 F | BODY MASS INDEX: 47.06 KG/M2 | WEIGHT: 292.8 LBS | DIASTOLIC BLOOD PRESSURE: 76 MMHG | HEIGHT: 66 IN | SYSTOLIC BLOOD PRESSURE: 132 MMHG | HEART RATE: 90 BPM

## 2023-03-08 VITALS
SYSTOLIC BLOOD PRESSURE: 132 MMHG | BODY MASS INDEX: 47.13 KG/M2 | TEMPERATURE: 97 F | WEIGHT: 292 LBS | HEART RATE: 84 BPM | DIASTOLIC BLOOD PRESSURE: 76 MMHG

## 2023-03-08 DIAGNOSIS — E66.01 CLASS 3 SEVERE OBESITY DUE TO EXCESS CALORIES WITH SERIOUS COMORBIDITY AND BODY MASS INDEX (BMI) OF 45.0 TO 49.9 IN ADULT (HCC): ICD-10-CM

## 2023-03-08 DIAGNOSIS — J45.30 MILD PERSISTENT ASTHMA WITHOUT COMPLICATION: ICD-10-CM

## 2023-03-08 DIAGNOSIS — E78.2 MIXED HYPERLIPIDEMIA: ICD-10-CM

## 2023-03-08 DIAGNOSIS — Z00.00 ROUTINE GENERAL MEDICAL EXAMINATION AT A HEALTH CARE FACILITY: ICD-10-CM

## 2023-03-08 DIAGNOSIS — Z12.31 ENCOUNTER FOR SCREENING MAMMOGRAM FOR MALIGNANT NEOPLASM OF BREAST: ICD-10-CM

## 2023-03-08 DIAGNOSIS — Z12.4 SCREENING FOR CERVICAL CANCER: ICD-10-CM

## 2023-03-08 DIAGNOSIS — F41.1 GENERALIZED ANXIETY DISORDER WITH PANIC ATTACKS: ICD-10-CM

## 2023-03-08 DIAGNOSIS — G47.01 INSOMNIA DUE TO MEDICAL CONDITION: ICD-10-CM

## 2023-03-08 DIAGNOSIS — Z23 NEED FOR PROPHYLACTIC VACCINATION AGAINST STREPTOCOCCUS PNEUMONIAE (PNEUMOCOCCUS): ICD-10-CM

## 2023-03-08 DIAGNOSIS — I10 ESSENTIAL HYPERTENSION: ICD-10-CM

## 2023-03-08 DIAGNOSIS — B37.9 YEAST INFECTION: ICD-10-CM

## 2023-03-08 DIAGNOSIS — F32.0 MILD SINGLE CURRENT EPISODE OF MAJOR DEPRESSIVE DISORDER (HCC): ICD-10-CM

## 2023-03-08 DIAGNOSIS — Z13.1 SCREENING FOR DIABETES MELLITUS: ICD-10-CM

## 2023-03-08 DIAGNOSIS — M50.122 CERVICAL DISC DISORDER AT C5-C6 LEVEL WITH RADICULOPATHY: ICD-10-CM

## 2023-03-08 DIAGNOSIS — Z00.00 ROUTINE GENERAL MEDICAL EXAMINATION AT A HEALTH CARE FACILITY: Primary | ICD-10-CM

## 2023-03-08 DIAGNOSIS — Z12.4 CERVICAL CANCER SCREENING: ICD-10-CM

## 2023-03-08 DIAGNOSIS — F41.0 GENERALIZED ANXIETY DISORDER WITH PANIC ATTACKS: ICD-10-CM

## 2023-03-08 DIAGNOSIS — Z12.4 CERVICAL CANCER SCREENING: Primary | ICD-10-CM

## 2023-03-08 LAB
A/G RATIO: 1.3 (ref 1.1–2.2)
ALBUMIN SERPL-MCNC: 4.3 G/DL (ref 3.4–5)
ALP BLD-CCNC: 58 U/L (ref 40–129)
ALT SERPL-CCNC: 14 U/L (ref 10–40)
ANION GAP SERPL CALCULATED.3IONS-SCNC: 9 MMOL/L (ref 3–16)
AST SERPL-CCNC: 13 U/L (ref 15–37)
BILIRUB SERPL-MCNC: 0.3 MG/DL (ref 0–1)
BUN BLDV-MCNC: 15 MG/DL (ref 7–20)
CALCIUM SERPL-MCNC: 9.3 MG/DL (ref 8.3–10.6)
CHLORIDE BLD-SCNC: 101 MMOL/L (ref 99–110)
CHOLESTEROL, FASTING: 211 MG/DL (ref 0–199)
CO2: 25 MMOL/L (ref 21–32)
CREAT SERPL-MCNC: 0.8 MG/DL (ref 0.6–1.1)
FOLLICLE STIMULATING HORMONE: 4.9 MIU/ML
GFR SERPL CREATININE-BSD FRML MDRD: >60 ML/MIN/{1.73_M2}
GLUCOSE BLD-MCNC: 94 MG/DL (ref 70–99)
HDLC SERPL-MCNC: 36 MG/DL (ref 40–60)
LDL CHOLESTEROL CALCULATED: 117 MG/DL
LUTEINIZING HORMONE: 6.8 MIU/ML
PAP SMEAR, EXTERNAL: NORMAL
POTASSIUM SERPL-SCNC: 4.8 MMOL/L (ref 3.5–5.1)
SODIUM BLD-SCNC: 135 MMOL/L (ref 136–145)
TOTAL PROTEIN: 7.5 G/DL (ref 6.4–8.2)
TRIGLYCERIDE, FASTING: 292 MG/DL (ref 0–150)
VLDLC SERPL CALC-MCNC: 58 MG/DL

## 2023-03-08 PROCEDURE — 90732 PPSV23 VACC 2 YRS+ SUBQ/IM: CPT | Performed by: STUDENT IN AN ORGANIZED HEALTH CARE EDUCATION/TRAINING PROGRAM

## 2023-03-08 PROCEDURE — 99213 OFFICE O/P EST LOW 20 MIN: CPT | Performed by: STUDENT IN AN ORGANIZED HEALTH CARE EDUCATION/TRAINING PROGRAM

## 2023-03-08 PROCEDURE — 3078F DIAST BP <80 MM HG: CPT | Performed by: STUDENT IN AN ORGANIZED HEALTH CARE EDUCATION/TRAINING PROGRAM

## 2023-03-08 PROCEDURE — 3075F SYST BP GE 130 - 139MM HG: CPT | Performed by: STUDENT IN AN ORGANIZED HEALTH CARE EDUCATION/TRAINING PROGRAM

## 2023-03-08 PROCEDURE — 90471 IMMUNIZATION ADMIN: CPT | Performed by: STUDENT IN AN ORGANIZED HEALTH CARE EDUCATION/TRAINING PROGRAM

## 2023-03-08 PROCEDURE — 99396 PREV VISIT EST AGE 40-64: CPT | Performed by: STUDENT IN AN ORGANIZED HEALTH CARE EDUCATION/TRAINING PROGRAM

## 2023-03-08 RX ORDER — FLUTICASONE PROPIONATE 44 UG/1
2 AEROSOL, METERED RESPIRATORY (INHALATION) 2 TIMES DAILY
Qty: 10.6 G | Refills: 5 | Status: SHIPPED | OUTPATIENT
Start: 2023-03-08

## 2023-03-08 RX ORDER — METOPROLOL TARTRATE 50 MG/1
50 TABLET, FILM COATED ORAL 2 TIMES DAILY
Qty: 180 TABLET | Refills: 1 | Status: SHIPPED | OUTPATIENT
Start: 2023-03-08

## 2023-03-08 RX ORDER — MONTELUKAST SODIUM 10 MG/1
TABLET ORAL
Qty: 90 TABLET | Refills: 3 | Status: SHIPPED | OUTPATIENT
Start: 2023-03-08

## 2023-03-08 RX ORDER — GABAPENTIN 300 MG/1
300 CAPSULE ORAL 3 TIMES DAILY
Qty: 90 CAPSULE | Refills: 5 | Status: SHIPPED | OUTPATIENT
Start: 2023-03-08 | End: 2023-04-07

## 2023-03-08 RX ORDER — LORAZEPAM 0.5 MG/1
TABLET ORAL
Qty: 25 TABLET | Refills: 5 | Status: SHIPPED | OUTPATIENT
Start: 2023-03-08 | End: 2023-09-11

## 2023-03-08 RX ORDER — SERTRALINE HYDROCHLORIDE 100 MG/1
150 TABLET, FILM COATED ORAL DAILY
Qty: 135 TABLET | Refills: 1 | Status: SHIPPED | OUTPATIENT
Start: 2023-03-08 | End: 2023-06-06

## 2023-03-08 RX ORDER — FLUCONAZOLE 150 MG/1
150 TABLET ORAL ONCE
Qty: 1 TABLET | Refills: 0 | Status: SHIPPED | OUTPATIENT
Start: 2023-03-08 | End: 2023-03-08

## 2023-03-08 RX ORDER — TRAZODONE HYDROCHLORIDE 100 MG/1
100 TABLET ORAL NIGHTLY
Qty: 90 TABLET | Refills: 1 | Status: SHIPPED | OUTPATIENT
Start: 2023-03-08

## 2023-03-08 ASSESSMENT — PATIENT HEALTH QUESTIONNAIRE - PHQ9
SUM OF ALL RESPONSES TO PHQ QUESTIONS 1-9: 4
5. POOR APPETITE OR OVEREATING: 0
SUM OF ALL RESPONSES TO PHQ QUESTIONS 1-9: 4
7. TROUBLE CONCENTRATING ON THINGS, SUCH AS READING THE NEWSPAPER OR WATCHING TELEVISION: 0
SUM OF ALL RESPONSES TO PHQ9 QUESTIONS 1 & 2: 0
6. FEELING BAD ABOUT YOURSELF - OR THAT YOU ARE A FAILURE OR HAVE LET YOURSELF OR YOUR FAMILY DOWN: 0
9. THOUGHTS THAT YOU WOULD BE BETTER OFF DEAD, OR OF HURTING YOURSELF: 0
SUM OF ALL RESPONSES TO PHQ QUESTIONS 1-9: 4
4. FEELING TIRED OR HAVING LITTLE ENERGY: 1
10. IF YOU CHECKED OFF ANY PROBLEMS, HOW DIFFICULT HAVE THESE PROBLEMS MADE IT FOR YOU TO DO YOUR WORK, TAKE CARE OF THINGS AT HOME, OR GET ALONG WITH OTHER PEOPLE: 0
2. FEELING DOWN, DEPRESSED OR HOPELESS: 0
1. LITTLE INTEREST OR PLEASURE IN DOING THINGS: 0
8. MOVING OR SPEAKING SO SLOWLY THAT OTHER PEOPLE COULD HAVE NOTICED. OR THE OPPOSITE, BEING SO FIGETY OR RESTLESS THAT YOU HAVE BEEN MOVING AROUND A LOT MORE THAN USUAL: 0
3. TROUBLE FALLING OR STAYING ASLEEP: 3
SUM OF ALL RESPONSES TO PHQ QUESTIONS 1-9: 4

## 2023-03-08 ASSESSMENT — ENCOUNTER SYMPTOMS
CONSTIPATION: 0
NAUSEA: 0
BACK PAIN: 0
CHEST TIGHTNESS: 0
SHORTNESS OF BREATH: 0
ABDOMINAL PAIN: 0
STRIDOR: 0
ABDOMINAL PAIN: 0
DIARRHEA: 0
BLOOD IN STOOL: 0

## 2023-03-08 NOTE — PROGRESS NOTES
[facility]  3/8/2023    Yousif Harris (:  1980) is a 38 y.o. female, here for evaluation of the following medical concerns:    Chief Complaint   Patient presents with    Gynecologic Exam        ASSESSMENT/ PLAN  1. Cervical cancer screening  With HPV cotesting  - PAP SMEAR  - C.trachomatis N.gonorrhoeae DNA, Thin Prep; Future    2. Encounter for screening mammogram for malignant neoplasm of breast  - DEBI DIGITAL SCREEN W OR WO CAD BILATERAL; Future    3. Yeast infection  - fluconazole (DIFLUCAN) 150 MG tablet; Take 1 tablet by mouth once for 1 dose  Dispense: 1 tablet; Refill: 0     HPI  Patient is a G1, P3 female who presents today for routine Pap smear. She does endorse history of abnormal Pap smear, but states that it did not require colposcopy. She thinks that her last Pap smear was about 5 years ago. She is sexually active with 1 male partner, does not use protection. She has a menstrual cycle monthly, however it varies in duration and flow. Vaginal concerns include pruritus. Denies vaginal discharge or odor. ROS  Review of Systems   Constitutional:  Negative for fatigue and unexpected weight change. Gastrointestinal:  Negative for abdominal pain and nausea. Genitourinary:  Positive for menstrual problem. Negative for difficulty urinating, dyspareunia, dysuria, genital sores, pelvic pain and vaginal discharge.      HISTORIES  Current Outpatient Medications on File Prior to Visit   Medication Sig Dispense Refill    fluticasone (FLOVENT HFA) 44 MCG/ACT inhaler Inhale 2 puffs into the lungs 2 times daily 10.6 g 5    sertraline (ZOLOFT) 100 MG tablet Take 1.5 tablets by mouth daily 135 tablet 1    LORazepam (ATIVAN) 0.5 MG tablet TAKE ONE TABLET BY MOUTH EVERY 8 HOURS AS NEEDED FOR ANXIETY 25 tablet 5    montelukast (SINGULAIR) 10 MG tablet TAKE ONE TABLET BY MOUTH ONCE NIGHTLY 90 tablet 3    metoprolol tartrate (LOPRESSOR) 50 MG tablet Take 1 tablet by mouth 2 times daily 180 tablet 1    traZODone (DESYREL) 100 MG tablet Take 1 tablet by mouth nightly 90 tablet 1    gabapentin (NEURONTIN) 300 MG capsule Take 1 capsule by mouth 3 times daily for 30 days. 90 capsule 5    famotidine (PEPCID) 20 MG tablet TAKE ONE TABLET BY MOUTH TWICE A DAY 60 tablet 5    traZODone (DESYREL) 50 MG tablet TAKE ONE TABLET BY MOUTH TWICE A DAY 95 tablet 3    albuterol sulfate HFA (PROVENTIL;VENTOLIN;PROAIR) 108 (90 Base) MCG/ACT inhaler INHALE TWO PUFFS BY MOUTH EVERY 6 HOURS AS NEEDED FOR WHEEZING OR SHORTNESS OF BREATH 25.5 g 1    cyclobenzaprine (FLEXERIL) 5 MG tablet TAKE ONE TABLET BY MOUTH TWICE A DAY AS NEEDED FOR MUSCLE SPASMS 60 tablet 5    omeprazole (PRILOSEC) 40 MG delayed release capsule Take 1 capsule by mouth daily 30 capsule 3    cetirizine (ZYRTEC) 10 MG tablet Take 10 mg by mouth daily. No current facility-administered medications on file prior to visit.       Past Medical History:   Diagnosis Date    Allergic rhinitis     Asthma     Class 3 severe obesity due to excess calories with serious comorbidity and body mass index (BMI) of 45.0 to 49.9 in [facility]) 05/13/2021    Depression 05/19/2016    GERD (gastroesophageal reflux disease)     Headache 2/13/2016    Hypertension 11/01/2017    Major depressive disorder, recurrent episode, moderate with anxious distress ([facility] 11/07/2017    Pyloric stenosis      Patient Active Problem List   Diagnosis    Mixed hyperlipidemia    GERD (gastroesophageal reflux disease)    Cervical disc herniation    Mild single current episode of major depressive disorder (HCC)    Essential hypertension    Generalized anxiety disorder with panic attacks    Mild persistent asthma without complication    Class 3 severe obesity due to excess calories with serious comorbidity and body mass index (BMI) of 45.0 to 49.9 in [facility])    Cervical disc disorder at C5-C6 level with radiculopathy       PE  Vitals:    03/08/23 0956   BP: 132/76   Pulse: 84   Temp: 97 °F (36.1 °C)   TempSrc: Temporal   Weight: 292 lb (132.5 kg)     Estimated body mass index is 47.13 kg/m² as calculated from the following:    Height as of an earlier encounter on 3/8/23: 5' 6\" (1.676 m). Weight as of this encounter: 292 lb (132.5 kg). Physical Exam  Vitals reviewed. Constitutional:       Appearance: Normal appearance. She is obese. She is not ill-appearing. HENT:      Head: Normocephalic and atraumatic. Pulmonary:      Effort: Pulmonary effort is normal.   Abdominal:      General: Abdomen is flat. Palpations: Abdomen is soft. Tenderness: There is no abdominal tenderness. Genitourinary:     Comments: Normal external genitalia, no inguinal lymphadenopathy, vaginal mucosa moist and pink without lesions, cervix and cervical os visualized without abnormality. + Yeast.    Neurological:      Mental Status: She is alert. Psychiatric:         Mood and Affect: Mood normal.         Behavior: Behavior normal.       Shakira Mclean DO    This dictation was generated by voice recognition computer software. Although all attempts are made to edit the dictation for accuracy, there may be errors in the transcription that are not intended.

## 2023-03-09 DIAGNOSIS — J45.901 MILD ASTHMA EXACERBATION: ICD-10-CM

## 2023-03-09 LAB
C. TRACHOMATIS DNA ,URINE: NEGATIVE
ESTIMATED AVERAGE GLUCOSE: 111.2 MG/DL
HBA1C MFR BLD: 5.5 %
N. GONORRHOEAE DNA, URINE: NEGATIVE

## 2023-03-10 RX ORDER — DOXYCYCLINE HYCLATE 100 MG
TABLET ORAL
Qty: 14 TABLET | Refills: 0 | OUTPATIENT
Start: 2023-03-10

## 2023-03-10 NOTE — TELEPHONE ENCOUNTER
Medication:   Requested Prescriptions     Pending Prescriptions Disp Refills    doxycycline hyclate (VIBRA-TABS) 100 MG tablet [Pharmacy Med Name: DOXYCYCLINE HYCLATE 100 MG TAB] 14 tablet 0     Sig: TAKE ONE TABLET BY MOUTH TWICE A DAY FOR 7 DAYS        Last Filled:      Patient Phone Number: [phone] (home)     Last appt: 3/8/2023   Next appt: Visit date not found    Last OARRS: No flowsheet data found.

## 2023-03-27 RX ORDER — CYCLOBENZAPRINE HCL 5 MG
5 TABLET ORAL 3 TIMES DAILY PRN
Qty: 60 TABLET | Refills: 5 | Status: SHIPPED | OUTPATIENT
Start: 2023-03-27

## 2023-05-23 ENCOUNTER — TELEPHONE (OUTPATIENT)
Dept: PRIMARY CARE CLINIC | Age: 43
End: 2023-05-23

## 2023-05-23 DIAGNOSIS — M50.122 CERVICAL DISC DISORDER AT C5-C6 LEVEL WITH RADICULOPATHY: ICD-10-CM

## 2023-05-23 RX ORDER — GABAPENTIN 300 MG/1
300 CAPSULE ORAL 3 TIMES DAILY
Qty: 42 CAPSULE | Refills: 0 | Status: SHIPPED | OUTPATIENT
Start: 2023-05-23 | End: 2023-07-06

## 2023-05-23 NOTE — TELEPHONE ENCOUNTER
gabapentin (NEURONTIN) 300 MG capsule [    [address], [place] [address]    Pt called in and would like a week worth of this medication sent to the pharmacy above. Pt states that she have a family emergency and had to leave town.

## 2023-05-23 NOTE — TELEPHONE ENCOUNTER
Pended script is a 14 days supplies    Medication:   Requested Prescriptions     Pending Prescriptions Disp Refills    gabapentin (NEURONTIN) 300 MG capsule 42 capsule 0     Sig: Take 1 capsule by mouth 3 times daily for 44 days. Last Filled:  03/08/23    Patient Phone Number: [phone] (home)     Last appt: 4/12/2023 Return if symptoms worsen or fail to improve. Next appt: Visit date not found    Last OARRS: No flowsheet data found.

## 2023-07-22 DIAGNOSIS — F41.1 GENERALIZED ANXIETY DISORDER: ICD-10-CM

## 2023-07-24 RX ORDER — TRAZODONE HYDROCHLORIDE 50 MG/1
TABLET ORAL
Qty: 95 TABLET | Refills: 3 | Status: SHIPPED | OUTPATIENT
Start: 2023-07-24 | End: 2023-07-28

## 2023-07-24 NOTE — TELEPHONE ENCOUNTER
Medication:   Requested Prescriptions     Pending Prescriptions Disp Refills    traZODone (DESYREL) 50 MG tablet [Pharmacy Med Name: traZODone 50 MG TABLET] 95 tablet 3     Sig: TAKE ONE TABLET BY MOUTH TWICE A DAY        Last Filled:  03/8/23    Patient Phone Number: [phone] (home)     Last appt: 4/12/2023    Return if symptoms worsen or fail to improve. Next appt: Visit date not found    Last OARRS: No flowsheet data found.

## 2023-07-28 DIAGNOSIS — J45.30 MILD PERSISTENT ASTHMA WITHOUT COMPLICATION: ICD-10-CM

## 2023-07-28 DIAGNOSIS — G47.01 INSOMNIA DUE TO MEDICAL CONDITION: ICD-10-CM

## 2023-07-28 RX ORDER — FLUTICASONE PROPIONATE 44 UG/1
2 AEROSOL, METERED RESPIRATORY (INHALATION) 2 TIMES DAILY
Qty: 10.6 G | Refills: 5 | Status: SHIPPED | OUTPATIENT
Start: 2023-07-28

## 2023-07-28 RX ORDER — TRAZODONE HYDROCHLORIDE 100 MG/1
100 TABLET ORAL NIGHTLY
Qty: 90 TABLET | Refills: 1 | Status: SHIPPED | OUTPATIENT
Start: 2023-07-28

## 2023-07-28 NOTE — TELEPHONE ENCOUNTER
Medication:   Requested Prescriptions     Pending Prescriptions Disp Refills    fluticasone (FLOVENT HFA) 44 MCG/ACT inhaler 10.6 g 5     Sig: Inhale 2 puffs into the lungs 2 times daily    traZODone (DESYREL) 100 MG tablet 90 tablet 1     Sig: Take 1 tablet by mouth nightly        Last Filled:  03/08/2023    Patient Phone Number: [phone] (home)     Last appt: 4/12/2023   Next appt: Visit date not found    Last OARRS: No flowsheet data found.

## 2023-08-16 DIAGNOSIS — M50.122 CERVICAL DISC DISORDER AT C5-C6 LEVEL WITH RADICULOPATHY: ICD-10-CM

## 2023-08-16 DIAGNOSIS — F41.1 GENERALIZED ANXIETY DISORDER WITH PANIC ATTACKS: ICD-10-CM

## 2023-08-16 DIAGNOSIS — F41.0 GENERALIZED ANXIETY DISORDER WITH PANIC ATTACKS: ICD-10-CM

## 2023-08-16 DIAGNOSIS — F32.0 MILD SINGLE CURRENT EPISODE OF MAJOR DEPRESSIVE DISORDER (HCC): ICD-10-CM

## 2023-08-17 RX ORDER — SERTRALINE HYDROCHLORIDE 100 MG/1
TABLET, FILM COATED ORAL
Qty: 135 TABLET | Refills: 1 | Status: SHIPPED | OUTPATIENT
Start: 2023-08-17

## 2023-08-17 RX ORDER — PREGABALIN 75 MG/1
CAPSULE ORAL
Qty: 180 CAPSULE | Refills: 0 | Status: SHIPPED | OUTPATIENT
Start: 2023-08-17 | End: 2023-11-15

## 2023-08-17 NOTE — TELEPHONE ENCOUNTER
Medication:   Requested Prescriptions     Pending Prescriptions Disp Refills    sertraline (ZOLOFT) 100 MG tablet [Pharmacy Med Name: SERTRALINE  MG TABLET] 135 tablet 1     Sig: TAKE 1 AND 1/2 TABLET BY MOUTH DAILY    pregabalin (LYRICA) 75 MG capsule [Pharmacy Med Name: PREGABALIN 75 MG CAPSULE] 180 capsule      Sig: TAKE ONE CAPSULE BY MOUTH TWICE A DAY        Last Filled:  03/08/23    Patient Phone Number: [phone] (home)     Last appt: 4/12/2023 if symptoms worsen or fail to improve. Return in about 6 months (around 9/8/2023) for Blood Pressure. Next appt: Visit date not found    Last OARRS: No flowsheet data found.

## 2023-09-09 DIAGNOSIS — F41.0 GENERALIZED ANXIETY DISORDER WITH PANIC ATTACKS: ICD-10-CM

## 2023-09-09 DIAGNOSIS — F41.1 GENERALIZED ANXIETY DISORDER WITH PANIC ATTACKS: ICD-10-CM

## 2023-09-11 RX ORDER — LORAZEPAM 0.5 MG/1
TABLET ORAL
Qty: 25 TABLET | Refills: 0 | Status: SHIPPED | OUTPATIENT
Start: 2023-09-11 | End: 2023-10-11

## 2023-09-11 NOTE — TELEPHONE ENCOUNTER
Medication:   Requested Prescriptions     Pending Prescriptions Disp Refills    LORazepam (ATIVAN) 0.5 MG tablet [Pharmacy Med Name: LORazepam 0.5 MG TABLET] 25 tablet      Sig: TAKE ONE TABLET BY MOUTH EVERY 8 HOURS AS NEEDED FOR ANXIETY        Last Filled:  03/8/23    Patient Phone Number: [phone] (home)     Last appt: 4/12/2023 Return in about 6 months (around 9/8/2023) for Blood Pressure  Next appt: Visit date not found    Last OARRS:        No data to display

## 2023-10-13 ENCOUNTER — OFFICE VISIT (OUTPATIENT)
Dept: PRIMARY CARE CLINIC | Age: 43
End: 2023-10-13
Payer: COMMERCIAL

## 2023-10-13 VITALS
TEMPERATURE: 97.5 F | DIASTOLIC BLOOD PRESSURE: 78 MMHG | SYSTOLIC BLOOD PRESSURE: 122 MMHG | BODY MASS INDEX: 47.09 KG/M2 | WEIGHT: 293 LBS | HEART RATE: 71 BPM | HEIGHT: 66 IN

## 2023-10-13 DIAGNOSIS — Z12.11 SCREEN FOR COLON CANCER: ICD-10-CM

## 2023-10-13 DIAGNOSIS — R73.03 PREDIABETES: ICD-10-CM

## 2023-10-13 DIAGNOSIS — F41.1 GENERALIZED ANXIETY DISORDER WITH PANIC ATTACKS: ICD-10-CM

## 2023-10-13 DIAGNOSIS — E66.01 CLASS 3 SEVERE OBESITY DUE TO EXCESS CALORIES WITH SERIOUS COMORBIDITY AND BODY MASS INDEX (BMI) OF 45.0 TO 49.9 IN ADULT (HCC): ICD-10-CM

## 2023-10-13 DIAGNOSIS — Z12.4 SCREENING FOR CERVICAL CANCER: ICD-10-CM

## 2023-10-13 DIAGNOSIS — F32.0 MILD SINGLE CURRENT EPISODE OF MAJOR DEPRESSIVE DISORDER (HCC): ICD-10-CM

## 2023-10-13 DIAGNOSIS — I10 ESSENTIAL HYPERTENSION: Primary | ICD-10-CM

## 2023-10-13 DIAGNOSIS — Z23 NEED FOR IMMUNIZATION AGAINST INFLUENZA: ICD-10-CM

## 2023-10-13 DIAGNOSIS — F41.0 GENERALIZED ANXIETY DISORDER WITH PANIC ATTACKS: ICD-10-CM

## 2023-10-13 PROCEDURE — 90471 IMMUNIZATION ADMIN: CPT | Performed by: STUDENT IN AN ORGANIZED HEALTH CARE EDUCATION/TRAINING PROGRAM

## 2023-10-13 PROCEDURE — 99214 OFFICE O/P EST MOD 30 MIN: CPT | Performed by: STUDENT IN AN ORGANIZED HEALTH CARE EDUCATION/TRAINING PROGRAM

## 2023-10-13 PROCEDURE — 3078F DIAST BP <80 MM HG: CPT | Performed by: STUDENT IN AN ORGANIZED HEALTH CARE EDUCATION/TRAINING PROGRAM

## 2023-10-13 PROCEDURE — 3074F SYST BP LT 130 MM HG: CPT | Performed by: STUDENT IN AN ORGANIZED HEALTH CARE EDUCATION/TRAINING PROGRAM

## 2023-10-13 PROCEDURE — 90674 CCIIV4 VAC NO PRSV 0.5 ML IM: CPT | Performed by: STUDENT IN AN ORGANIZED HEALTH CARE EDUCATION/TRAINING PROGRAM

## 2023-10-13 RX ORDER — DULAGLUTIDE 1.5 MG/.5ML
1.5 INJECTION, SOLUTION SUBCUTANEOUS WEEKLY
Qty: 0.5 ML | Refills: 5 | Status: SHIPPED | OUTPATIENT
Start: 2023-10-13

## 2023-10-13 RX ORDER — METOPROLOL TARTRATE 50 MG/1
50 TABLET, FILM COATED ORAL 2 TIMES DAILY
Qty: 180 TABLET | Refills: 1 | Status: SHIPPED | OUTPATIENT
Start: 2023-10-13

## 2023-10-13 RX ORDER — SERTRALINE HYDROCHLORIDE 100 MG/1
TABLET, FILM COATED ORAL
Qty: 135 TABLET | Refills: 1 | Status: SHIPPED | OUTPATIENT
Start: 2023-10-13

## 2023-10-13 RX ORDER — LORAZEPAM 0.5 MG/1
0.5 TABLET ORAL EVERY 8 HOURS PRN
Qty: 25 TABLET | Refills: 0 | Status: SHIPPED | OUTPATIENT
Start: 2023-10-13 | End: 2023-11-12

## 2023-10-13 ASSESSMENT — ENCOUNTER SYMPTOMS
CHEST TIGHTNESS: 0
SHORTNESS OF BREATH: 0
COUGH: 0

## 2023-10-13 NOTE — PROGRESS NOTES
against influenza  - Influenza, FLUCELVAX, (age 10 mo+), IM, Preservative Free, 0.5 mL    Return in about 6 months (around 4/13/2024) for Blood Pressure, Weight. An electronic signature was used to authenticate this note.     --Reina Govea,  on 10/13/2023 at 10:11 AM

## 2023-10-18 ENCOUNTER — TELEPHONE (OUTPATIENT)
Dept: PRIMARY CARE CLINIC | Age: 43
End: 2023-10-18

## 2023-10-18 NOTE — TELEPHONE ENCOUNTER
Asya Massey from [facility] called- need to start a PA for trulicity- they would like chart notes faxed to fax # [phone]

## 2023-10-20 ENCOUNTER — PATIENT MESSAGE (OUTPATIENT)
Dept: PRIMARY CARE CLINIC | Age: 43
End: 2023-10-20

## 2023-10-20 DIAGNOSIS — R73.03 PREDIABETES: Primary | ICD-10-CM

## 2023-10-26 RX ORDER — METFORMIN HYDROCHLORIDE 500 MG/1
500 TABLET, EXTENDED RELEASE ORAL
Qty: 30 TABLET | Refills: 5 | Status: SHIPPED | OUTPATIENT
Start: 2023-10-26

## 2023-11-02 ENCOUNTER — TELEMEDICINE (OUTPATIENT)
Dept: PRIMARY CARE CLINIC | Age: 43
End: 2023-11-02
Payer: COMMERCIAL

## 2023-11-02 DIAGNOSIS — E66.01 CLASS 3 SEVERE OBESITY DUE TO EXCESS CALORIES WITH SERIOUS COMORBIDITY AND BODY MASS INDEX (BMI) OF 45.0 TO 49.9 IN ADULT (HCC): ICD-10-CM

## 2023-11-02 DIAGNOSIS — R73.03 PREDIABETES: Primary | ICD-10-CM

## 2023-11-02 PROCEDURE — 99213 OFFICE O/P EST LOW 20 MIN: CPT | Performed by: STUDENT IN AN ORGANIZED HEALTH CARE EDUCATION/TRAINING PROGRAM

## 2023-11-02 ASSESSMENT — ENCOUNTER SYMPTOMS
CHEST TIGHTNESS: 0
NAUSEA: 0
ABDOMINAL PAIN: 0
SHORTNESS OF BREATH: 0
DIARRHEA: 0
ABDOMINAL DISTENTION: 0

## 2023-11-02 NOTE — PROGRESS NOTES
2023       TELEHEALTH EVALUATION -- Audio/Visual (During GXFFN-75 public health emergency)    HPI:    Gabo Yarbrough (:  1980) has requested an audio/video evaluation for the following concern(s):    Prediabetes  Val Andrews presents today for virtual visit for evaluation of prediabetes. At her most recent visit her lab work treated that she has an A1c in the prediabetic range. We discussed different lifestyle modifications and I offered her metformin treatment. She had elected at that time to try it, but when she went home she had read some side effects about the medication. After reading will what she did online, she did not want to start the medications without asking me some questions today. Review of Systems   Constitutional:  Negative for activity change, appetite change, fatigue and unexpected weight change. Eyes:  Negative for visual disturbance. Respiratory:  Negative for chest tightness and shortness of breath. Gastrointestinal:  Negative for abdominal distention, abdominal pain, diarrhea and nausea. Endocrine: Negative for polydipsia, polyphagia and polyuria. Genitourinary:  Negative for decreased urine volume, dysuria and frequency. Musculoskeletal:  Negative for gait problem and myalgias. Skin:  Negative for rash and wound. Neurological:  Negative for dizziness, weakness, light-headedness and numbness. Hematological:  Does not bruise/bleed easily. Prior to Visit Medications    Medication Sig Taking? Authorizing Provider   metFORMIN (GLUCOPHAGE-XR) 500 MG extended release tablet Take 1 tablet by mouth daily (with breakfast) Yes Reina Govea, DO   LORazepam (ATIVAN) 0.5 MG tablet Take 1 tablet by mouth every 8 hours as needed for Anxiety for up to 30 days.  Yes Reina Govea, DO   metoprolol tartrate (LOPRESSOR) 50 MG tablet Take 1 tablet by mouth 2 times daily Yes Reina Govea, DO   sertraline (ZOLOFT) 100 MG tablet TAKE 1 AND 1/2 TABLET BY MOUTH DAILY Yes Kacy Rodriguez

## 2023-11-11 DIAGNOSIS — M50.122 CERVICAL DISC DISORDER AT C5-C6 LEVEL WITH RADICULOPATHY: ICD-10-CM

## 2023-11-13 RX ORDER — GABAPENTIN 300 MG/1
300 CAPSULE ORAL 3 TIMES DAILY
Qty: 90 CAPSULE | Refills: 1 | Status: SHIPPED | OUTPATIENT
Start: 2023-11-13 | End: 2024-05-11

## 2023-11-13 NOTE — TELEPHONE ENCOUNTER
Medication:   Requested Prescriptions     Pending Prescriptions Disp Refills    gabapentin (NEURONTIN) 300 MG capsule [Pharmacy Med Name: GABAPENTIN 300 MG CAPSULE] 90 capsule      Sig: Take 1 capsule by mouth in the morning, at noon, and at bedtime.         Last Filled:  05/223/23    Patient Phone Number: [phone] (home)     Last appt: 11/2/2023   Next appt: 4/15/2024    Last OARRS:        No data to display

## 2023-11-16 ENCOUNTER — TELEPHONE (OUTPATIENT)
Dept: PRIMARY CARE CLINIC | Age: 43
End: 2023-11-16

## 2023-11-16 LAB — NONINV COLON CA DNA+OCC BLD SCRN STL QL: NEGATIVE

## 2023-11-22 ENCOUNTER — PATIENT MESSAGE (OUTPATIENT)
Dept: PRIMARY CARE CLINIC | Age: 43
End: 2023-11-22

## 2023-11-22 DIAGNOSIS — F41.0 GENERALIZED ANXIETY DISORDER WITH PANIC ATTACKS: ICD-10-CM

## 2023-11-22 DIAGNOSIS — R73.03 PREDIABETES: ICD-10-CM

## 2023-11-22 DIAGNOSIS — F41.1 GENERALIZED ANXIETY DISORDER WITH PANIC ATTACKS: ICD-10-CM

## 2023-11-22 DIAGNOSIS — E66.01 CLASS 3 SEVERE OBESITY DUE TO EXCESS CALORIES WITH SERIOUS COMORBIDITY AND BODY MASS INDEX (BMI) OF 45.0 TO 49.9 IN ADULT (HCC): Primary | ICD-10-CM

## 2023-11-22 NOTE — TELEPHONE ENCOUNTER
I am more than happy to increase the patient's Zoloft, but I do want her to know that it takes about a month to 6 weeks after increasing the dose for people to notice an effect. If she would like to do a quick virtual visit with me, I can talk to her about what symptoms she is having and I may be able to offer a more quick alternative. We could add her on as a double book tomorrow is a virtual if that works for her. decreased urine output/CONGESTION/COUGH/DECREASED EATING/DRINKING/FEVER/VOMITING

## 2023-11-24 ENCOUNTER — HOSPITAL ENCOUNTER (EMERGENCY)
Age: 43
Discharge: HOME | End: 2023-11-24
Attending: EMERGENCY MEDICINE
Payer: COMMERCIAL

## 2023-11-24 ENCOUNTER — APPOINTMENT (OUTPATIENT)
Dept: GENERAL RADIOLOGY | Age: 43
End: 2023-11-24
Payer: COMMERCIAL

## 2023-11-24 VITALS
HEIGHT: 66 IN | RESPIRATION RATE: 10 BRPM | BODY MASS INDEX: 46.78 KG/M2 | OXYGEN SATURATION: 98 % | DIASTOLIC BLOOD PRESSURE: 79 MMHG | SYSTOLIC BLOOD PRESSURE: 130 MMHG | TEMPERATURE: 98.8 F | WEIGHT: 291.1 LBS | HEART RATE: 66 BPM

## 2023-11-24 DIAGNOSIS — S93.402A SPRAIN OF LEFT ANKLE, UNSPECIFIED LIGAMENT, INITIAL ENCOUNTER: Primary | ICD-10-CM

## 2023-11-24 PROCEDURE — 99283 EMERGENCY DEPT VISIT LOW MDM: CPT

## 2023-11-24 PROCEDURE — 73610 X-RAY EXAM OF ANKLE: CPT

## 2023-11-24 RX ORDER — IBUPROFEN 400 MG/1
400 TABLET ORAL ONCE
Status: DISCONTINUED | OUTPATIENT
Start: 2023-11-24 | End: 2023-11-24

## 2023-11-24 ASSESSMENT — PAIN DESCRIPTION - DESCRIPTORS: DESCRIPTORS: ACHING

## 2023-11-24 ASSESSMENT — PAIN DESCRIPTION - PAIN TYPE: TYPE: ACUTE PAIN

## 2023-11-24 ASSESSMENT — PAIN DESCRIPTION - ORIENTATION: ORIENTATION: LEFT

## 2023-11-24 ASSESSMENT — PAIN DESCRIPTION - LOCATION: LOCATION: ANKLE

## 2023-11-24 ASSESSMENT — PAIN - FUNCTIONAL ASSESSMENT: PAIN_FUNCTIONAL_ASSESSMENT: 0-10

## 2023-11-24 NOTE — ED NOTES
Patient states that she does not want  crutches. Shoe boot in place. Pt given d/c instructions with return verbalization. Emphasis on f/u, to return with worsening s/s Declined use of WC. Ambulated to lobby with steADY GAIT.      Mayela Acevedo RN  11/24/23 1559

## 2023-11-24 NOTE — ED TRIAGE NOTES
L ankle pain. Asha Gain off of sofa last evening and rolled ankle. Lateral malleolus swollen. NVS intact distally.

## 2023-11-27 RX ORDER — DULAGLUTIDE 1.5 MG/.5ML
1.5 INJECTION, SOLUTION SUBCUTANEOUS WEEKLY
Qty: 0.5 ML | Refills: 5 | Status: SHIPPED | OUTPATIENT
Start: 2023-11-27 | End: 2023-11-30

## 2023-11-27 RX ORDER — LORAZEPAM 0.5 MG/1
TABLET ORAL
Qty: 25 TABLET | Refills: 0 | Status: SHIPPED | OUTPATIENT
Start: 2023-11-27 | End: 2023-12-27

## 2023-11-27 NOTE — TELEPHONE ENCOUNTER
Medication:   Requested Prescriptions     Pending Prescriptions Disp Refills    LORazepam (ATIVAN) 0.5 MG tablet [Pharmacy Med Name: LORazepam 0.5 MG TABLET] 25 tablet      Sig: TAKE ONE TABLET BY MOUTH EVERY 8 HOURS AS NEEDED FOR ANXIETY FOR UP TO 30 DAYS        Last Filled:  2/17/22    Last appt: 11/2/2023   Next appt: 4/15/2024    Last OARRS:        No data to display

## 2023-11-30 ENCOUNTER — TELEPHONE (OUTPATIENT)
Dept: PRIMARY CARE CLINIC | Age: 43
End: 2023-11-30

## 2023-11-30 RX ORDER — DULAGLUTIDE 0.75 MG/.5ML
0.75 INJECTION, SOLUTION SUBCUTANEOUS WEEKLY
Qty: 2 ML | Refills: 1 | Status: SHIPPED | OUTPATIENT
Start: 2023-11-30

## 2023-11-30 NOTE — TELEPHONE ENCOUNTER
Dulaglutide (TRULICITY) 0.75 MG/0.5ML SOPN [[id number]]    Order Details  Dose: 0.75 mg Route: SubCUTAneous Frequency: WEEKLY   Dispense Quantity: 2 mL Refills: 1          Sig: Inject 0.75 mg into the skin once a week         Start Date: 11/30/23 End Date: --   Written Date: 11/30/23 Expiration Date: 11/29/24   Providers    Authorizing Provider: Craig Jeronimo DO NPI: [id number]   Ordering User: Craig Jeronimo DO          Pharmacy    [facility] [id number] - P [phone] - F [phone]  [facility] [id number]  Phone: [phone]  Fax: [phone]

## 2023-11-30 NOTE — TELEPHONE ENCOUNTER
PA for Trulicity was denied in 10/17/23 telephone encounter.  [facility] pharmacist said that patient must have a Dx of Type 2 Diabetes.

## 2024-01-11 DIAGNOSIS — M50.122 CERVICAL DISC DISORDER AT C5-C6 LEVEL WITH RADICULOPATHY: ICD-10-CM

## 2024-01-11 RX ORDER — GABAPENTIN 300 MG/1
CAPSULE ORAL
Qty: 90 CAPSULE | Refills: 5 | Status: SHIPPED | OUTPATIENT
Start: 2024-01-11 | End: 2024-07-09

## 2024-01-11 NOTE — TELEPHONE ENCOUNTER
Medication:   Requested Prescriptions     Pending Prescriptions Disp Refills    gabapentin (NEURONTIN) 300 MG capsule [Pharmacy Med Name: GABAPENTIN 300 MG CAPSULE] 90 capsule 1     Sig: TAKE ONE CAPSULE BY MOUTH EVERY MORNING AT NOON AND AT BEDTIME        Last Filled:  11/13/23    Patient Phone Number: [phone] (home)     Last appt: 11/2/2023   Next appt: 4/15/2024    Last OARRS:        No data to display

## 2024-01-14 DIAGNOSIS — F41.1 GENERALIZED ANXIETY DISORDER WITH PANIC ATTACKS: ICD-10-CM

## 2024-01-14 DIAGNOSIS — G47.01 INSOMNIA DUE TO MEDICAL CONDITION: ICD-10-CM

## 2024-01-14 DIAGNOSIS — F41.0 GENERALIZED ANXIETY DISORDER WITH PANIC ATTACKS: ICD-10-CM

## 2024-01-14 DIAGNOSIS — J45.30 MILD PERSISTENT ASTHMA WITHOUT COMPLICATION: ICD-10-CM

## 2024-01-15 RX ORDER — MONTELUKAST SODIUM 10 MG/1
TABLET ORAL
Qty: 90 TABLET | Refills: 3 | Status: SHIPPED | OUTPATIENT
Start: 2024-01-15

## 2024-01-15 RX ORDER — TRAZODONE HYDROCHLORIDE 100 MG/1
100 TABLET ORAL NIGHTLY
Qty: 90 TABLET | Refills: 1 | Status: SHIPPED | OUTPATIENT
Start: 2024-01-15

## 2024-01-15 RX ORDER — CYCLOBENZAPRINE HCL 5 MG
5 TABLET ORAL 3 TIMES DAILY PRN
Qty: 60 TABLET | Refills: 5 | Status: SHIPPED | OUTPATIENT
Start: 2024-01-15

## 2024-01-15 RX ORDER — ALBUTEROL SULFATE 90 UG/1
AEROSOL, METERED RESPIRATORY (INHALATION)
Qty: 25.5 G | Refills: 1 | Status: SHIPPED | OUTPATIENT
Start: 2024-01-15

## 2024-01-15 RX ORDER — LORAZEPAM 0.5 MG/1
TABLET ORAL
Qty: 25 TABLET | Refills: 5 | Status: SHIPPED | OUTPATIENT
Start: 2024-01-15 | End: 2024-02-14

## 2024-01-15 NOTE — TELEPHONE ENCOUNTER
Medication:   Requested Prescriptions     Pending Prescriptions Disp Refills    LORazepam (ATIVAN) 0.5 MG tablet [Pharmacy Med Name: LORazepam 0.5 MG TABLET] 25 tablet      Sig: TAKE ONE TABLET BY MOUTH EVERY 8 HOURS AS NEEDED FOR ANXIETY FOR UP TO 30 DAYS    montelukast (SINGULAIR) 10 MG tablet [Pharmacy Med Name: MONTELUKAST SOD 10 MG TABLET] 90 tablet 3     Sig: TAKE ONE TABLET BY MOUTH ONCE NIGHTLY    cyclobenzaprine (FLEXERIL) 5 MG tablet [Pharmacy Med Name: CYCLOBENZAPRINE 5 MG TABLET] 60 tablet 5     Sig: TAKE ONE TABLET BY MOUTH THREE TIMES A DAY AS NEEDED FOR MUSCLE SPASMS    traZODone (DESYREL) 100 MG tablet [Pharmacy Med Name: traZODone 100 MG TABLET] 90 tablet 1     Sig: TAKE ONE TABLET BY MOUTH ONCE NIGHTLY    albuterol sulfate HFA (PROVENTIL;VENTOLIN;PROAIR) 108 (90 Base) MCG/ACT inhaler [Pharmacy Med Name: ALBUTEROL HFA 90 MCG INHALER] 25.5 g 1     Sig: INHALE TWO PUFFS BY MOUTH EVERY 6 HOURS AS NEEDED FOR WHEEZING OR SHORTNESS OF BREATH        Last Filled:  07/28/23    Patient Phone Number: [phone] (home)     Last appt: 11/2/2023   Next appt: 4/15/2024    Last OARRS:        No data to display

## 2024-02-10 DIAGNOSIS — F32.0 MILD SINGLE CURRENT EPISODE OF MAJOR DEPRESSIVE DISORDER (HCC): ICD-10-CM

## 2024-02-10 DIAGNOSIS — F41.1 GENERALIZED ANXIETY DISORDER WITH PANIC ATTACKS: ICD-10-CM

## 2024-02-10 DIAGNOSIS — F41.0 GENERALIZED ANXIETY DISORDER WITH PANIC ATTACKS: ICD-10-CM

## 2024-02-12 RX ORDER — SERTRALINE HYDROCHLORIDE 100 MG/1
TABLET, FILM COATED ORAL
Qty: 135 TABLET | Refills: 1 | Status: SHIPPED | OUTPATIENT
Start: 2024-02-12

## 2024-02-12 NOTE — TELEPHONE ENCOUNTER
Medication:   Requested Prescriptions     Pending Prescriptions Disp Refills    sertraline (ZOLOFT) 100 MG tablet [Pharmacy Med Name: SERTRALINE  MG TABLET] 135 tablet 1     Sig: TAKE 1 AND 1/2 TABLET BY MOUTH DAILY        Last Filled:  10/13/23    Patient Phone Number: [phone] (home)     Last appt: 11/2/2023   Next appt: 4/15/2024    Last OARRS:        No data to display

## 2024-04-09 DIAGNOSIS — I10 ESSENTIAL HYPERTENSION: ICD-10-CM

## 2024-04-09 RX ORDER — METOPROLOL TARTRATE 50 MG/1
50 TABLET, FILM COATED ORAL 2 TIMES DAILY
Qty: 180 TABLET | Refills: 1 | Status: SHIPPED | OUTPATIENT
Start: 2024-04-09

## 2024-04-09 NOTE — TELEPHONE ENCOUNTER
Medication:   Requested Prescriptions     Pending Prescriptions Disp Refills    metoprolol tartrate (LOPRESSOR) 50 MG tablet [Pharmacy Med Name: METOPROLOL TARTRATE 50 MG TAB] 180 tablet 1     Sig: TAKE 1 TABLET BY MOUTH TWICE A DAY        Last Filled:  10/13/23    Patient Phone Number: [phone] (home)     Last appt: 11/2/2023 Return in about 6 weeks (around 12/14/2023) for Diabetes.   Next appt: 4/15/2024    Last OARRS:        No data to display

## 2024-04-22 DIAGNOSIS — R73.03 PREDIABETES: ICD-10-CM

## 2024-04-22 RX ORDER — METFORMIN HYDROCHLORIDE 500 MG/1
500 TABLET, EXTENDED RELEASE ORAL DAILY
Qty: 30 TABLET | Refills: 5 | OUTPATIENT
Start: 2024-04-22

## 2024-04-22 NOTE — TELEPHONE ENCOUNTER
Medication:   Requested Prescriptions     Pending Prescriptions Disp Refills    metFORMIN (GLUCOPHAGE-XR) 500 MG extended release tablet [Pharmacy Med Name: METFORMIN HCL  MG TABLET] 30 tablet 5     Sig: TAKE 1 TABLET BY MOUTH DAILY WITH BREAKFAST        Last Filled:  10/26/23    Patient Phone Number: [phone] (home)     Last appt: 11/2/2023   Return in about 6 weeks (around 12/14/2023) for Diabetes.  Next appt: Visit date not found    Last OARRS:        No data to display

## 2024-05-20 SDOH — ECONOMIC STABILITY: FOOD INSECURITY: WITHIN THE PAST 12 MONTHS, YOU WORRIED THAT YOUR FOOD WOULD RUN OUT BEFORE YOU GOT MONEY TO BUY MORE.: NEVER TRUE

## 2024-05-20 SDOH — ECONOMIC STABILITY: INCOME INSECURITY: HOW HARD IS IT FOR YOU TO PAY FOR THE VERY BASICS LIKE FOOD, HOUSING, MEDICAL CARE, AND HEATING?: NOT VERY HARD

## 2024-05-20 SDOH — ECONOMIC STABILITY: FOOD INSECURITY: WITHIN THE PAST 12 MONTHS, THE FOOD YOU BOUGHT JUST DIDN'T LAST AND YOU DIDN'T HAVE MONEY TO GET MORE.: NEVER TRUE

## 2024-05-20 ASSESSMENT — PATIENT HEALTH QUESTIONNAIRE - PHQ9
7. TROUBLE CONCENTRATING ON THINGS, SUCH AS READING THE NEWSPAPER OR WATCHING TELEVISION: SEVERAL DAYS
10. IF YOU CHECKED OFF ANY PROBLEMS, HOW DIFFICULT HAVE THESE PROBLEMS MADE IT FOR YOU TO DO YOUR WORK, TAKE CARE OF THINGS AT HOME, OR GET ALONG WITH OTHER PEOPLE: SOMEWHAT DIFFICULT
9. THOUGHTS THAT YOU WOULD BE BETTER OFF DEAD, OR OF HURTING YOURSELF: NOT AT ALL
SUM OF ALL RESPONSES TO PHQ QUESTIONS 1-9: 4
5. POOR APPETITE OR OVEREATING: SEVERAL DAYS
3. TROUBLE FALLING OR STAYING ASLEEP: SEVERAL DAYS
SUM OF ALL RESPONSES TO PHQ QUESTIONS 1-9: 4
4. FEELING TIRED OR HAVING LITTLE ENERGY: SEVERAL DAYS
6. FEELING BAD ABOUT YOURSELF - OR THAT YOU ARE A FAILURE OR HAVE LET YOURSELF OR YOUR FAMILY DOWN: NOT AT ALL
8. MOVING OR SPEAKING SO SLOWLY THAT OTHER PEOPLE COULD HAVE NOTICED. OR THE OPPOSITE - BEING SO FIDGETY OR RESTLESS THAT YOU HAVE BEEN MOVING AROUND A LOT MORE THAN USUAL: NOT AT ALL
6. FEELING BAD ABOUT YOURSELF - OR THAT YOU ARE A FAILURE OR HAVE LET YOURSELF OR YOUR FAMILY DOWN: NOT AT ALL
2. FEELING DOWN, DEPRESSED OR HOPELESS: NOT AT ALL
1. LITTLE INTEREST OR PLEASURE IN DOING THINGS: NOT AT ALL
10. IF YOU CHECKED OFF ANY PROBLEMS, HOW DIFFICULT HAVE THESE PROBLEMS MADE IT FOR YOU TO DO YOUR WORK, TAKE CARE OF THINGS AT HOME, OR GET ALONG WITH OTHER PEOPLE: SOMEWHAT DIFFICULT
7. TROUBLE CONCENTRATING ON THINGS, SUCH AS READING THE NEWSPAPER OR WATCHING TELEVISION: SEVERAL DAYS
3. TROUBLE FALLING OR STAYING ASLEEP: SEVERAL DAYS
SUM OF ALL RESPONSES TO PHQ QUESTIONS 1-9: 4
SUM OF ALL RESPONSES TO PHQ9 QUESTIONS 1 & 2: 0
9. THOUGHTS THAT YOU WOULD BE BETTER OFF DEAD, OR OF HURTING YOURSELF: NOT AT ALL
SUM OF ALL RESPONSES TO PHQ QUESTIONS 1-9: 4
8. MOVING OR SPEAKING SO SLOWLY THAT OTHER PEOPLE COULD HAVE NOTICED. OR THE OPPOSITE, BEING SO FIGETY OR RESTLESS THAT YOU HAVE BEEN MOVING AROUND A LOT MORE THAN USUAL: NOT AT ALL
5. POOR APPETITE OR OVEREATING: SEVERAL DAYS
SUM OF ALL RESPONSES TO PHQ QUESTIONS 1-9: 4
1. LITTLE INTEREST OR PLEASURE IN DOING THINGS: NOT AT ALL
4. FEELING TIRED OR HAVING LITTLE ENERGY: SEVERAL DAYS
2. FEELING DOWN, DEPRESSED OR HOPELESS: NOT AT ALL

## 2024-05-21 ENCOUNTER — OFFICE VISIT (OUTPATIENT)
Dept: PRIMARY CARE CLINIC | Age: 44
End: 2024-05-21
Payer: COMMERCIAL

## 2024-05-21 VITALS
DIASTOLIC BLOOD PRESSURE: 78 MMHG | BODY MASS INDEX: 47.09 KG/M2 | HEIGHT: 66 IN | TEMPERATURE: 98.7 F | SYSTOLIC BLOOD PRESSURE: 124 MMHG | WEIGHT: 293 LBS | HEART RATE: 80 BPM

## 2024-05-21 DIAGNOSIS — F32.0 MILD SINGLE CURRENT EPISODE OF MAJOR DEPRESSIVE DISORDER (HCC): ICD-10-CM

## 2024-05-21 DIAGNOSIS — F41.0 GENERALIZED ANXIETY DISORDER WITH PANIC ATTACKS: ICD-10-CM

## 2024-05-21 DIAGNOSIS — I10 ESSENTIAL HYPERTENSION: ICD-10-CM

## 2024-05-21 DIAGNOSIS — Z00.00 ROUTINE GENERAL MEDICAL EXAMINATION AT A HEALTH CARE FACILITY: Primary | ICD-10-CM

## 2024-05-21 DIAGNOSIS — R73.03 PREDIABETES: ICD-10-CM

## 2024-05-21 DIAGNOSIS — E66.01 CLASS 3 SEVERE OBESITY DUE TO EXCESS CALORIES WITH SERIOUS COMORBIDITY AND BODY MASS INDEX (BMI) OF 45.0 TO 49.9 IN ADULT (HCC): ICD-10-CM

## 2024-05-21 DIAGNOSIS — F41.1 GENERALIZED ANXIETY DISORDER WITH PANIC ATTACKS: ICD-10-CM

## 2024-05-21 DIAGNOSIS — J45.30 MILD PERSISTENT ASTHMA WITHOUT COMPLICATION: ICD-10-CM

## 2024-05-21 LAB — HBA1C MFR BLD: 5.5 %

## 2024-05-21 PROCEDURE — 83036 HEMOGLOBIN GLYCOSYLATED A1C: CPT | Performed by: STUDENT IN AN ORGANIZED HEALTH CARE EDUCATION/TRAINING PROGRAM

## 2024-05-21 PROCEDURE — 99396 PREV VISIT EST AGE 40-64: CPT | Performed by: STUDENT IN AN ORGANIZED HEALTH CARE EDUCATION/TRAINING PROGRAM

## 2024-05-21 PROCEDURE — 3078F DIAST BP <80 MM HG: CPT | Performed by: STUDENT IN AN ORGANIZED HEALTH CARE EDUCATION/TRAINING PROGRAM

## 2024-05-21 PROCEDURE — 3074F SYST BP LT 130 MM HG: CPT | Performed by: STUDENT IN AN ORGANIZED HEALTH CARE EDUCATION/TRAINING PROGRAM

## 2024-05-21 RX ORDER — FLUTICASONE PROPIONATE 44 UG/1
2 AEROSOL, METERED RESPIRATORY (INHALATION) 2 TIMES DAILY
Qty: 10.6 G | Refills: 5 | Status: SHIPPED | OUTPATIENT
Start: 2024-05-21

## 2024-05-21 RX ORDER — METOPROLOL TARTRATE 50 MG/1
50 TABLET, FILM COATED ORAL 2 TIMES DAILY
Qty: 180 TABLET | Refills: 1 | Status: SHIPPED | OUTPATIENT
Start: 2024-05-21

## 2024-05-21 RX ORDER — SERTRALINE HYDROCHLORIDE 100 MG/1
150 TABLET, FILM COATED ORAL DAILY
Qty: 135 TABLET | Refills: 1 | Status: SHIPPED | OUTPATIENT
Start: 2024-05-21

## 2024-05-21 RX ORDER — METHYLPREDNISOLONE 4 MG/1
TABLET ORAL
Qty: 1 KIT | Refills: 0 | Status: SHIPPED | OUTPATIENT
Start: 2024-05-21 | End: 2024-05-27

## 2024-05-21 RX ORDER — PHENTERMINE HYDROCHLORIDE 37.5 MG/1
37.5 CAPSULE ORAL EVERY MORNING
Qty: 30 CAPSULE | Refills: 0 | Status: SHIPPED | OUTPATIENT
Start: 2024-05-21 | End: 2024-06-20

## 2024-05-21 RX ORDER — PHENTERMINE HYDROCHLORIDE 37.5 MG/1
37.5 CAPSULE ORAL EVERY MORNING
Qty: 30 CAPSULE | Refills: 0
Start: 2024-05-21 | End: 2024-05-21

## 2024-05-21 ASSESSMENT — ENCOUNTER SYMPTOMS
BLOOD IN STOOL: 0
DIARRHEA: 0
ABDOMINAL PAIN: 0
STRIDOR: 0
CONSTIPATION: 0
BACK PAIN: 0
CHEST TIGHTNESS: 0
SHORTNESS OF BREATH: 0

## 2024-05-21 NOTE — PROGRESS NOTES
History    Marital status: Unknown     Spouse name: Not on file    Number of children: Not on file    Years of education: Not on file    Highest education level: Not on file   Occupational History    Not on file   Tobacco Use    Smoking status: Never    Smokeless tobacco: Never    Tobacco comments:     Never   Vaping Use    Vaping Use: Never used   Substance and Sexual Activity    Alcohol use: No     Comment: occ    Drug use: Never    Sexual activity: Yes     Partners: Male   Other Topics Concern    Not on file   Social History Narrative    Not on file     Social Determinants of Health     Financial Resource Strain: Low Risk  (5/20/2024)    Overall Financial Resource Strain (CARDIA)     Difficulty of Paying Living Expenses: Not very hard   Food Insecurity: No Food Insecurity (5/20/2024)    Hunger Vital Sign     Worried About Running Out of Food in the Last Year: Never true     Ran Out of Food in the Last Year: Never true   Transportation Needs: Unknown (5/20/2024)    PRAPARE - Transportation     Lack of Transportation (Medical): Not on file     Lack of Transportation (Non-Medical): No   Physical Activity: Not on file   Stress: Not on file   Social Connections: Not on file   Intimate Partner Violence: Not on file   Housing Stability: Unknown (5/20/2024)    Housing Stability Vital Sign     Unable to Pay for Housing in the Last Year: Not on file     Number of Places Lived in the Last Year: Not on file     Unstable Housing in the Last Year: No        Family History   Problem Relation Age of Onset    Diabetes Mother     High Cholesterol Mother     Asthma Mother     Cancer Father     Alcohol Abuse Father     Heart Disease Maternal Grandmother        Vitals:    05/21/24 1422   BP: 124/78   Pulse: 80   Temp: 98.7 °F (37.1 °C)   TempSrc: Temporal   Weight: 135 kg (297 lb 9.6 oz)   Height: 1.676 m (5' 6\")     Estimated body mass index is 48.03 kg/m² as calculated from the following:    Height as of this encounter: 1.676 m (5'

## 2024-06-17 ENCOUNTER — OFFICE VISIT (OUTPATIENT)
Age: 44
End: 2024-06-17
Payer: COMMERCIAL

## 2024-06-17 VITALS — SYSTOLIC BLOOD PRESSURE: 142 MMHG | OXYGEN SATURATION: 98 % | DIASTOLIC BLOOD PRESSURE: 94 MMHG | HEART RATE: 86 BPM

## 2024-06-17 DIAGNOSIS — Z01.419 WELL WOMAN EXAM WITH ROUTINE GYNECOLOGICAL EXAM: Primary | ICD-10-CM

## 2024-06-17 PROCEDURE — 3080F DIAST BP >= 90 MM HG: CPT | Performed by: OBSTETRICS & GYNECOLOGY

## 2024-06-17 PROCEDURE — 3077F SYST BP >= 140 MM HG: CPT | Performed by: OBSTETRICS & GYNECOLOGY

## 2024-06-17 PROCEDURE — 99386 PREV VISIT NEW AGE 40-64: CPT | Performed by: OBSTETRICS & GYNECOLOGY

## 2024-06-17 NOTE — PROGRESS NOTES
SUBJECTIVE:  Cynthia Thornton is an 43 y.o. year old woman who presents for annual gyn exam.    REVIEW OF SYSTEMS:  No complaints of symptoms involving:  Constitutional: there has been no unanticipated weight loss. There's been no change in activity level. Negative for fever, chills.  Eyes: No visual changes, double vision, or scotomata. No scleral icterus.  HENT: No Headaches, hearing loss or vertigo. No sore throat, ear pain or nasal congestion  Respiratory: no cough or wheezing, no sputum production, no hemoptysis.    Gastrointestinal: No abdominal pain, appetite loss, blood in stools. No change in bowel habits.  Genitourinary: No dysuria, trouble voiding, or hematuria. No change in bladder habits.  Musculoskeletal:  No gait disturbance,no weakness or joint complaints.  Skin: No rash or pruritis.  Neurological: No headache, vision changes, change in muscle strength, numbness or tingling. No change in gait, balance, coordination.  Psychiatric: No anxiety, or depression. No change in mood or behavior.  Endocrine: No temperature intolerance. No excessive thirst, fluid intake, or urination. No tremor.  Hematologic/Lymphatic: No abnormal bruising or bleeding, blood clots or swollen lymph nodes.       Patient Active Problem List   Diagnosis    Mixed hyperlipidemia    GERD (gastroesophageal reflux disease)    Mild single current episode of major depressive disorder (HCC)    Essential hypertension    Generalized anxiety disorder with panic attacks    Mild persistent asthma without complication    Class 3 severe obesity due to excess calories with serious comorbidity and body mass index (BMI) of 45.0 to 49.9 in adult ([facility])    Cervical disc disorder at C5-C6 level with radiculopathy     Current Outpatient Medications   Medication Sig Dispense Refill    fluticasone (FLOVENT HFA) 44 MCG/ACT inhaler Inhale 2 puffs into the lungs 2 times daily 10.6 g 5    metoprolol tartrate (LOPRESSOR) 50 MG tablet Take 1 tablet by mouth 2

## 2024-06-20 LAB
HPV HR 12 DNA SPEC QL NAA+PROBE: NOT DETECTED
HPV16 DNA SPEC QL NAA+PROBE: NOT DETECTED
HPV16+18+H RISK 12 DNA SPEC-IMP: NORMAL
HPV18 DNA SPEC QL NAA+PROBE: NOT DETECTED

## 2024-07-14 DIAGNOSIS — J45.30 MILD PERSISTENT ASTHMA WITHOUT COMPLICATION: ICD-10-CM

## 2024-07-14 DIAGNOSIS — G47.01 INSOMNIA DUE TO MEDICAL CONDITION: ICD-10-CM

## 2024-07-15 RX ORDER — TRAZODONE HYDROCHLORIDE 100 MG/1
100 TABLET ORAL NIGHTLY
Qty: 90 TABLET | Refills: 1 | Status: SHIPPED | OUTPATIENT
Start: 2024-07-15

## 2024-07-15 RX ORDER — ALBUTEROL SULFATE 90 UG/1
AEROSOL, METERED RESPIRATORY (INHALATION)
Qty: 25.5 G | Refills: 1 | Status: SHIPPED | OUTPATIENT
Start: 2024-07-15

## 2024-07-15 NOTE — TELEPHONE ENCOUNTER
Medication:   Requested Prescriptions     Pending Prescriptions Disp Refills    traZODone (DESYREL) 100 MG tablet [Pharmacy Med Name: traZODone 100 MG TABLET] 90 tablet 1     Sig: TAKE ONE TABLET BY MOUTH ONCE NIGHTLY    albuterol sulfate HFA (PROVENTIL;VENTOLIN;PROAIR) 108 (90 Base) MCG/ACT inhaler [Pharmacy Med Name: ALBUTEROL HFA 90 MCG INHALER] 25.5 g 1     Sig: INHALE 2 PUFFS BY MOUTH EVERY 6 HOURS AS NEEDED FOR WHEEZING OR SHORTNESS OF BREATH        Last Filled:  01/15/24    Patient Phone Number: [phone] (home)     Last appt: 5/21/2024 Return in about 4 weeks (around 6/18/2024) for Weight.   Next appt: 7/17/2024    Last OARRS:        No data to display                   Pt. would like to go home upon d/c from hospital.

## 2024-07-17 ENCOUNTER — OFFICE VISIT (OUTPATIENT)
Dept: PRIMARY CARE CLINIC | Age: 44
End: 2024-07-17
Payer: COMMERCIAL

## 2024-07-17 VITALS
WEIGHT: 286.8 LBS | HEIGHT: 66 IN | BODY MASS INDEX: 46.09 KG/M2 | SYSTOLIC BLOOD PRESSURE: 137 MMHG | HEART RATE: 75 BPM | TEMPERATURE: 97.9 F | DIASTOLIC BLOOD PRESSURE: 83 MMHG

## 2024-07-17 DIAGNOSIS — E66.01 CLASS 3 SEVERE OBESITY DUE TO EXCESS CALORIES WITH SERIOUS COMORBIDITY AND BODY MASS INDEX (BMI) OF 45.0 TO 49.9 IN ADULT (HCC): Primary | ICD-10-CM

## 2024-07-17 PROCEDURE — 99214 OFFICE O/P EST MOD 30 MIN: CPT | Performed by: STUDENT IN AN ORGANIZED HEALTH CARE EDUCATION/TRAINING PROGRAM

## 2024-07-17 PROCEDURE — 3079F DIAST BP 80-89 MM HG: CPT | Performed by: STUDENT IN AN ORGANIZED HEALTH CARE EDUCATION/TRAINING PROGRAM

## 2024-07-17 PROCEDURE — 3075F SYST BP GE 130 - 139MM HG: CPT | Performed by: STUDENT IN AN ORGANIZED HEALTH CARE EDUCATION/TRAINING PROGRAM

## 2024-07-17 RX ORDER — PHENTERMINE HYDROCHLORIDE 37.5 MG/1
37.5 CAPSULE ORAL EVERY MORNING
Qty: 30 CAPSULE | Refills: 0 | Status: SHIPPED | OUTPATIENT
Start: 2024-07-17 | End: 2024-08-16

## 2024-07-17 ASSESSMENT — ENCOUNTER SYMPTOMS
SHORTNESS OF BREATH: 0
WHEEZING: 0
NAUSEA: 0

## 2024-07-17 NOTE — PROGRESS NOTES
[facility]  2024    Cynthia Thornton (:  1980) is a 43 y.o. female, here for evaluation of the following medical concerns:    Chief Complaint   Patient presents with    Weight Management        ASSESSMENT/ PLAN  1. Class 3 severe obesity due to excess calories with serious comorbidity and body mass index (BMI) of 45.0 to 49.9 in adult ([facility])  Congratulated patient on her weight loss thus far.  Continue Adipex, diet and exercise.  PDMP reviewed.  Follow-up in 1 month for refills  - phentermine 37.5 MG capsule; Take 1 capsule by mouth every morning for 30 days. Max Daily Amount: 37.5 mg  Dispense: 30 capsule; Refill: 0       Return in about 4 weeks (around 2024) for adipex.    HPI  Patient presents today for management of Adipex.    In the past month, she has lost 13 pounds.  She has been utilizing weight watchers as her dietary plan.  Current exercise includes walking her dog, but she recently bought a treadmill on [employer] day and is looking forward to using that regularly.  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  She denies chest pain, palpitations, anxiety or lightheadedness while taking the medication.  Her goal weight is 200 pounds.    ROS  Review of Systems   Constitutional:  Negative for activity change and unexpected weight change.   HENT:  Negative for tinnitus.    Eyes:  Negative for visual disturbance.   Respiratory:  Negative for shortness of breath and wheezing.    Cardiovascular:  Negative for chest pain, palpitations and leg swelling.   Gastrointestinal:  Negative for nausea.   Genitourinary:  Negative for decreased urine volume.   Neurological:  Negative for syncope, light-headedness and headaches.       HISTORIES  Current Outpatient Medications on File Prior to Visit   Medication Sig Dispense Refill    vitamin D (CHOLECALCIFEROL) 25 MCG (1000 UT) TABS tablet Take 1 tablet by mouth daily 2,000      traZODone

## 2024-08-16 ENCOUNTER — OFFICE VISIT (OUTPATIENT)
Dept: PRIMARY CARE CLINIC | Age: 44
End: 2024-08-16
Payer: COMMERCIAL

## 2024-08-16 VITALS
BODY MASS INDEX: 45.19 KG/M2 | SYSTOLIC BLOOD PRESSURE: 135 MMHG | WEIGHT: 281.2 LBS | HEIGHT: 66 IN | HEART RATE: 76 BPM | TEMPERATURE: 98.7 F | DIASTOLIC BLOOD PRESSURE: 74 MMHG

## 2024-08-16 DIAGNOSIS — E66.01 CLASS 3 SEVERE OBESITY DUE TO EXCESS CALORIES WITH SERIOUS COMORBIDITY AND BODY MASS INDEX (BMI) OF 45.0 TO 49.9 IN ADULT (HCC): ICD-10-CM

## 2024-08-16 PROBLEM — E66.09 OBESITY DUE TO EXCESS CALORIES WITH SERIOUS COMORBIDITY: Status: ACTIVE | Noted: 2021-05-13

## 2024-08-16 PROCEDURE — 99214 OFFICE O/P EST MOD 30 MIN: CPT | Performed by: STUDENT IN AN ORGANIZED HEALTH CARE EDUCATION/TRAINING PROGRAM

## 2024-08-16 PROCEDURE — 3075F SYST BP GE 130 - 139MM HG: CPT | Performed by: STUDENT IN AN ORGANIZED HEALTH CARE EDUCATION/TRAINING PROGRAM

## 2024-08-16 PROCEDURE — 3078F DIAST BP <80 MM HG: CPT | Performed by: STUDENT IN AN ORGANIZED HEALTH CARE EDUCATION/TRAINING PROGRAM

## 2024-08-16 RX ORDER — PHENTERMINE HYDROCHLORIDE 37.5 MG/1
37.5 CAPSULE ORAL EVERY MORNING
Qty: 30 CAPSULE | Refills: 0 | Status: SHIPPED | OUTPATIENT
Start: 2024-08-16 | End: 2024-09-15

## 2024-08-16 ASSESSMENT — ENCOUNTER SYMPTOMS
SHORTNESS OF BREATH: 0
COUGH: 0
CHEST TIGHTNESS: 0

## 2024-08-16 NOTE — PROGRESS NOTES
2024     Cynthia Thornton (:  1980) is a 43 y.o. female, here for evaluation of the following medical concerns:    HPI  Obesity: Patient presents today for evaluation of obesity.  They have been taking phentermine for assistance with weight loss.  They have been compliant with a calorie restrictive diet.  They have been exercising 3 to 5 days/week.  They have been tolerating the phentermine well without side effects.  They have lost 5 pounds.  They deny nervousness, anxiousness, restlessness, palpitations or insomnia.     Review of Systems   Constitutional:  Negative for fatigue.   Eyes:  Negative for visual disturbance.   Respiratory:  Negative for cough, chest tightness and shortness of breath.    Cardiovascular:  Negative for chest pain, palpitations and leg swelling.   Endocrine: Negative for polydipsia, polyphagia and polyuria.   Genitourinary:  Negative for frequency.   Skin:  Negative for rash.   Neurological:  Negative for dizziness, syncope, weakness and light-headedness.       Prior to Visit Medications    Medication Sig Taking? Authorizing Provider   phentermine 37.5 MG capsule Take 1 capsule by mouth every morning for 30 days. Max Daily Amount: 37.5 mg Yes Craig Jeronimo DO   vitamin D (CHOLECALCIFEROL) 25 MCG (1000 UT) TABS tablet Take 1 tablet by mouth daily 2,000 Yes Provider, MD Ruben   traZODone (DESYREL) 100 MG tablet TAKE ONE TABLET BY MOUTH ONCE NIGHTLY Yes Craig Jeronimo DO   albuterol sulfate HFA (PROVENTIL;VENTOLIN;PROAIR) 108 (90 Base) MCG/ACT inhaler INHALE 2 PUFFS BY MOUTH EVERY 6 HOURS AS NEEDED FOR WHEEZING OR SHORTNESS OF BREATH Yes Craig Jeronimo DO   fluticasone (FLOVENT HFA) 44 MCG/ACT inhaler Inhale 2 puffs into the lungs 2 times daily Yes Craig Jeronimo DO   metoprolol tartrate (LOPRESSOR) 50 MG tablet Take 1 tablet by mouth 2 times daily Yes Craig Jeronimo DO   sertraline (ZOLOFT) 100 MG tablet Take 1.5 tablets by mouth daily Yes Craig Jeronimo DO   montelukast

## 2024-09-17 ENCOUNTER — OFFICE VISIT (OUTPATIENT)
Dept: PRIMARY CARE CLINIC | Age: 44
End: 2024-09-17
Payer: COMMERCIAL

## 2024-09-17 VITALS
WEIGHT: 274.5 LBS | DIASTOLIC BLOOD PRESSURE: 82 MMHG | TEMPERATURE: 97.3 F | HEART RATE: 81 BPM | SYSTOLIC BLOOD PRESSURE: 135 MMHG | BODY MASS INDEX: 44.11 KG/M2 | HEIGHT: 66 IN

## 2024-09-17 DIAGNOSIS — E66.01 CLASS 3 SEVERE OBESITY DUE TO EXCESS CALORIES WITH SERIOUS COMORBIDITY AND BODY MASS INDEX (BMI) OF 45.0 TO 49.9 IN ADULT (HCC): Primary | ICD-10-CM

## 2024-09-17 PROCEDURE — 99214 OFFICE O/P EST MOD 30 MIN: CPT | Performed by: STUDENT IN AN ORGANIZED HEALTH CARE EDUCATION/TRAINING PROGRAM

## 2024-09-17 PROCEDURE — 3075F SYST BP GE 130 - 139MM HG: CPT | Performed by: STUDENT IN AN ORGANIZED HEALTH CARE EDUCATION/TRAINING PROGRAM

## 2024-09-17 PROCEDURE — 3079F DIAST BP 80-89 MM HG: CPT | Performed by: STUDENT IN AN ORGANIZED HEALTH CARE EDUCATION/TRAINING PROGRAM

## 2024-09-17 RX ORDER — PHENTERMINE HYDROCHLORIDE 37.5 MG/1
37.5 CAPSULE ORAL EVERY MORNING
Qty: 30 CAPSULE | Refills: 0 | Status: SHIPPED | OUTPATIENT
Start: 2024-09-17 | End: 2024-09-19 | Stop reason: SDUPTHER

## 2024-09-17 SDOH — ECONOMIC STABILITY: FOOD INSECURITY: WITHIN THE PAST 12 MONTHS, YOU WORRIED THAT YOUR FOOD WOULD RUN OUT BEFORE YOU GOT MONEY TO BUY MORE.: NEVER TRUE

## 2024-09-17 SDOH — ECONOMIC STABILITY: FOOD INSECURITY: WITHIN THE PAST 12 MONTHS, THE FOOD YOU BOUGHT JUST DIDN'T LAST AND YOU DIDN'T HAVE MONEY TO GET MORE.: NEVER TRUE

## 2024-09-17 SDOH — ECONOMIC STABILITY: INCOME INSECURITY: HOW HARD IS IT FOR YOU TO PAY FOR THE VERY BASICS LIKE FOOD, HOUSING, MEDICAL CARE, AND HEATING?: NOT HARD AT ALL

## 2024-09-17 ASSESSMENT — ENCOUNTER SYMPTOMS
CHEST TIGHTNESS: 0
SHORTNESS OF BREATH: 0
COUGH: 0

## 2024-09-18 ENCOUNTER — PATIENT MESSAGE (OUTPATIENT)
Dept: PRIMARY CARE CLINIC | Age: 44
End: 2024-09-18

## 2024-09-19 DIAGNOSIS — E66.01 CLASS 3 SEVERE OBESITY DUE TO EXCESS CALORIES WITH SERIOUS COMORBIDITY AND BODY MASS INDEX (BMI) OF 45.0 TO 49.9 IN ADULT (HCC): ICD-10-CM

## 2024-09-19 RX ORDER — METHYLPREDNISOLONE 4 MG
TABLET, DOSE PACK ORAL
Qty: 1 KIT | Refills: 0 | Status: SHIPPED | OUTPATIENT
Start: 2024-09-19

## 2024-09-19 RX ORDER — PHENTERMINE HYDROCHLORIDE 37.5 MG/1
37.5 CAPSULE ORAL EVERY MORNING
Qty: 30 CAPSULE | Refills: 0 | Status: SHIPPED | OUTPATIENT
Start: 2024-09-19 | End: 2024-10-19

## 2024-10-15 ENCOUNTER — TELEPHONE (OUTPATIENT)
Dept: PRIMARY CARE CLINIC | Age: 44
End: 2024-10-15

## 2024-10-15 ENCOUNTER — OFFICE VISIT (OUTPATIENT)
Dept: PRIMARY CARE CLINIC | Age: 44
End: 2024-10-15
Payer: COMMERCIAL

## 2024-10-15 VITALS
TEMPERATURE: 98.4 F | HEIGHT: 66 IN | BODY MASS INDEX: 44.68 KG/M2 | WEIGHT: 278 LBS | HEART RATE: 87 BPM | SYSTOLIC BLOOD PRESSURE: 135 MMHG | OXYGEN SATURATION: 98 % | DIASTOLIC BLOOD PRESSURE: 69 MMHG

## 2024-10-15 DIAGNOSIS — F41.1 GENERALIZED ANXIETY DISORDER WITH PANIC ATTACKS: ICD-10-CM

## 2024-10-15 DIAGNOSIS — F41.0 GENERALIZED ANXIETY DISORDER WITH PANIC ATTACKS: ICD-10-CM

## 2024-10-15 DIAGNOSIS — F41.1 GENERALIZED ANXIETY DISORDER WITH PANIC ATTACKS: Primary | ICD-10-CM

## 2024-10-15 DIAGNOSIS — F32.0 MILD SINGLE CURRENT EPISODE OF MAJOR DEPRESSIVE DISORDER (HCC): ICD-10-CM

## 2024-10-15 DIAGNOSIS — F41.0 GENERALIZED ANXIETY DISORDER WITH PANIC ATTACKS: Primary | ICD-10-CM

## 2024-10-15 PROCEDURE — 3075F SYST BP GE 130 - 139MM HG: CPT | Performed by: STUDENT IN AN ORGANIZED HEALTH CARE EDUCATION/TRAINING PROGRAM

## 2024-10-15 PROCEDURE — 99214 OFFICE O/P EST MOD 30 MIN: CPT | Performed by: STUDENT IN AN ORGANIZED HEALTH CARE EDUCATION/TRAINING PROGRAM

## 2024-10-15 PROCEDURE — 3078F DIAST BP <80 MM HG: CPT | Performed by: STUDENT IN AN ORGANIZED HEALTH CARE EDUCATION/TRAINING PROGRAM

## 2024-10-15 RX ORDER — SERTRALINE HYDROCHLORIDE 100 MG/1
200 TABLET, FILM COATED ORAL DAILY
Qty: 180 TABLET | Refills: 1 | Status: SHIPPED | OUTPATIENT
Start: 2024-10-15

## 2024-10-15 RX ORDER — ALPRAZOLAM 0.5 MG
0.5 TABLET ORAL 3 TIMES DAILY PRN
Qty: 20 TABLET | Refills: 1 | Status: SHIPPED | OUTPATIENT
Start: 2024-10-15 | End: 2024-10-15 | Stop reason: SDUPTHER

## 2024-10-15 RX ORDER — ALPRAZOLAM 0.5 MG
0.5 TABLET ORAL 3 TIMES DAILY PRN
Qty: 20 TABLET | Refills: 1 | Status: SHIPPED | OUTPATIENT
Start: 2024-10-15 | End: 2024-11-14

## 2024-10-15 RX ORDER — PHENTERMINE HYDROCHLORIDE 37.5 MG/1
37.5 CAPSULE ORAL EVERY MORNING
Qty: 30 CAPSULE | Refills: 0 | Status: CANCELLED | OUTPATIENT
Start: 2024-10-15 | End: 2024-11-14

## 2024-10-15 ASSESSMENT — ENCOUNTER SYMPTOMS
VOMITING: 0
NAUSEA: 0
CHEST TIGHTNESS: 0
COUGH: 0
SHORTNESS OF BREATH: 0

## 2024-10-15 NOTE — PATIENT INSTRUCTIONS
hard time focusing.  Phobias  Symptoms may include:  More fear than most people of being around an object, being in a situation, or doing an activity. You might also be stressed about the chance of being around the thing you fear.  Worry about losing control, panicking, fainting, or having physical symptoms like a faster heartbeat when you are around the situation or object.  How are these disorders treated?  Anxiety disorders can be treated with medicines or counseling. A combination of both may be used.  Medicines may include:  Antidepressants. These may help your symptoms by keeping chemicals in your brain in balance.  Benzodiazepines. These may give you short-term relief of your symptoms.  Some people use cognitive-behavioral therapy. A therapist helps you learn to change stressful or bad thoughts into helpful thoughts.  Lead a healthy lifestyle  A healthy lifestyle may help you feel better.  Get at least 30 minutes of exercise on most days of the week. Walking is a good choice.  Eat a healthy diet. Include fruits, vegetables, lean proteins, and whole grains in your diet each day.  Try to go to bed at the same time every night. Try for 8 hours of sleep a night.  Find ways to manage stress. Try relaxation exercises.  Avoid alcohol and illegal drugs.  Follow-up care is a key part of your treatment and safety. Be sure to make and go to all appointments, and call your doctor if you are having problems. It's also a good idea to know your test results and keep a list of the medicines you take.  Where can you learn more?  Go to https://www.[employer].net/patientEd and enter K667 to learn more about \"Learning About Anxiety Disorders.\"  Current as of: June 24, 2023  Content Version: 14.2  © 2024 [employer].   Care instructions adapted under license by [employer]. If you have questions about a medical condition or this instruction, always ask your healthcare professional. [employer] disclaims

## 2024-10-15 NOTE — TELEPHONE ENCOUNTER
[facility] [id number] [place], [place] - [address] [place] [phone] - F [phone] [[id number]]     ALPRAZolam (XANAX) 0.5 MG tablet     Pt called in stating that this medication was sent to the wrong pharmacy. Can we send to the pharmacy ABOVE.

## 2024-10-15 NOTE — TELEPHONE ENCOUNTER
Medication:   Requested Prescriptions     Pending Prescriptions Disp Refills    ALPRAZolam (XANAX) 0.5 MG tablet 20 tablet 1     Sig: Take 1 tablet by mouth 3 times daily as needed for Sleep or Anxiety for up to 30 days. Max Daily Amount: 1.5 mg      Medication sent to the wrong pharmacy    Patient Phone Number: [phone] (home)     Last appt: 10/15/2024   Next appt: 11/15/2024    Last OARRS:        No data to display

## 2024-10-15 NOTE — PROGRESS NOTES
10/15/2024     Cynthia Thornton (:  1980) is a 44 y.o. female, here for evaluation of the following medical concerns:    HPI  Mood Disorder:  Patient presents for follow-up of depression and anxiety disorder. Current complaints include: irritability, excessive worry, restlessness, and panic attacks: 2-3/week.  She denies any other symptoms. Symptoms/signs of leland: none.  External stressors: dog passed away suddenly. Current treatment includes: Zoloft- 150.  Medication side effects: none.    Review of Systems   Constitutional:  Negative for activity change, fatigue and unexpected weight change.   Gastrointestinal:  Negative for nausea and vomiting.   Endocrine: Negative for cold intolerance and heat intolerance.   Skin:  Negative for rash.   Neurological:  Negative for dizziness, seizures and light-headedness.   Psychiatric/Behavioral:  Negative for agitation, decreased concentration, dysphoric mood, self-injury, sleep disturbance and suicidal ideas. The patient is nervous/anxious.        Prior to Visit Medications    Medication Sig Taking? Authorizing Provider   sertraline (ZOLOFT) 100 MG tablet Take 2 tablets by mouth daily Yes Craig Jeronimo DO   ALPRAZolam (XANAX) 0.5 MG tablet Take 1 tablet by mouth 3 times daily as needed for Sleep or Anxiety for up to 30 days. Max Daily Amount: 1.5 mg Yes Craig Jeronimo DO   vitamin D (CHOLECALCIFEROL) 25 MCG (1000 UT) TABS tablet Take 1 tablet by mouth daily 2,000 Yes Provider, MD Ruben   traZODone (DESYREL) 100 MG tablet TAKE ONE TABLET BY MOUTH ONCE NIGHTLY Yes Craig Jeronimo DO   albuterol sulfate HFA (PROVENTIL;VENTOLIN;PROAIR) 108 (90 Base) MCG/ACT inhaler INHALE 2 PUFFS BY MOUTH EVERY 6 HOURS AS NEEDED FOR WHEEZING OR SHORTNESS OF BREATH Yes Craig Jeronimo DO   fluticasone (FLOVENT HFA) 44 MCG/ACT inhaler Inhale 2 puffs into the lungs 2 times daily Yes Craig Jeronimo DO   metoprolol tartrate (LOPRESSOR) 50 MG tablet Take 1 tablet by mouth 2 times daily Yes

## 2024-10-15 NOTE — PROGRESS NOTES
10/15/2024     Cynthia Thornton (:  1980) is a 44 y.o. female, here for evaluation of the following medical concerns:    HPI  Obesity: Patient presents today for evaluation of obesity.  They have been taking phentermine for assistance with weight loss.  They have not been compliant with a calorie restrictive diet.  They have not been exercising 3 to 5 days/week.  They have been tolerating the phentermine well without side effects.  They have gained 4 pounds.  They deny nervousness, anxiousness, restlessness, palpitations or insomnia.     Review of Systems   Constitutional:  Negative for fatigue.   Eyes:  Negative for visual disturbance.   Respiratory:  Negative for cough, chest tightness and shortness of breath.    Cardiovascular:  Negative for chest pain, palpitations and leg swelling.   Endocrine: Negative for polydipsia, polyphagia and polyuria.   Genitourinary:  Negative for frequency.   Skin:  Negative for rash.   Neurological:  Negative for dizziness, syncope, weakness and light-headedness.       Prior to Visit Medications    Medication Sig Taking? Authorizing Provider   phentermine 37.5 MG capsule Take 1 capsule by mouth every morning for 30 days. Max Daily Amount: 37.5 mg Yes Craig Jeronimo DO   vitamin D (CHOLECALCIFEROL) 25 MCG (1000 UT) TABS tablet Take 1 tablet by mouth daily 2,000 Yes Provider, MD Ruben   traZODone (DESYREL) 100 MG tablet TAKE ONE TABLET BY MOUTH ONCE NIGHTLY Yes Craig Jeronimo DO   albuterol sulfate HFA (PROVENTIL;VENTOLIN;PROAIR) 108 (90 Base) MCG/ACT inhaler INHALE 2 PUFFS BY MOUTH EVERY 6 HOURS AS NEEDED FOR WHEEZING OR SHORTNESS OF BREATH Yes Craig Jeronimo DO   fluticasone (FLOVENT HFA) 44 MCG/ACT inhaler Inhale 2 puffs into the lungs 2 times daily Yes Craig Jeronimo DO   metoprolol tartrate (LOPRESSOR) 50 MG tablet Take 1 tablet by mouth 2 times daily Yes Craig Jeronimo DO   sertraline (ZOLOFT) 100 MG tablet Take 1.5 tablets by mouth daily Yes Craig Jeronimo DO  82 y/o hx of dementia, HTN, HPL, hypothyroidism brought in by EMS from assisted living facility for abdominal pain for the past 1 day.   CT abd/pelvis w/ IV contrast   CT head non contrast  CBC, CMP, EKG, trop, lipase   Zofran 4mg, 500 ml bolus fluids 80 y/o hx of dementia, HTN, HPL, hypothyroidism brought in by EMS from assisted living facility for abdominal pain for the past 1 day. AX01, epigastric, supraubic and RLQ tenderness.   CT abd/pelvis w/ IV contrast   CT head non contrast  CBC, CMP, EKG, trop, lipase   Zofran 4mg, 500 ml bolus fluids

## 2024-10-18 ENCOUNTER — HOSPITAL ENCOUNTER (OUTPATIENT)
Dept: MAMMOGRAPHY | Age: 44
Discharge: HOME | End: 2024-10-23
Payer: COMMERCIAL

## 2024-10-18 VITALS — HEIGHT: 66 IN | WEIGHT: 278 LBS | BODY MASS INDEX: 44.68 KG/M2

## 2024-10-18 DIAGNOSIS — Z12.31 VISIT FOR SCREENING MAMMOGRAM: ICD-10-CM

## 2024-10-18 PROCEDURE — 77063 BREAST TOMOSYNTHESIS BI: CPT

## 2024-10-22 ENCOUNTER — TELEPHONE (OUTPATIENT)
Dept: PRIMARY CARE CLINIC | Age: 44
End: 2024-10-22

## 2024-10-22 DIAGNOSIS — G47.01 INSOMNIA DUE TO MEDICAL CONDITION: ICD-10-CM

## 2024-10-22 DIAGNOSIS — M50.122 CERVICAL DISC DISORDER AT C5-C6 LEVEL WITH RADICULOPATHY: ICD-10-CM

## 2024-10-22 RX ORDER — GABAPENTIN 300 MG/1
300 CAPSULE ORAL 3 TIMES DAILY
Qty: 90 CAPSULE | Refills: 5 | Status: SHIPPED | OUTPATIENT
Start: 2024-10-22 | End: 2025-04-20

## 2024-10-22 NOTE — TELEPHONE ENCOUNTER
Medication:   Requested Prescriptions     Pending Prescriptions Disp Refills    gabapentin (NEURONTIN) 300 MG capsule 90 capsule 5     Sig: Take 1 capsule by mouth 3 times daily for 180 days.        Last Filled:  01/11/24    Patient Phone Number: [phone] (home)     Last appt: 10/15/2024   Next appt: 11/15/2024    Last OARRS:        No data to display

## 2024-10-22 NOTE — TELEPHONE ENCOUNTER
Refill      gabapentin (NEURONTIN) 300 MG capsule      traZODone (DESYREL) 100 MG [facility] [id number] [place], [place] - [facility] - [place] [phone] - f [phone] [[id number]]

## 2024-11-19 ENCOUNTER — OFFICE VISIT (OUTPATIENT)
Dept: PRIMARY CARE CLINIC | Age: 44
End: 2024-11-19
Payer: COMMERCIAL

## 2024-11-19 VITALS
DIASTOLIC BLOOD PRESSURE: 80 MMHG | HEART RATE: 60 BPM | HEIGHT: 66 IN | WEIGHT: 283 LBS | BODY MASS INDEX: 45.48 KG/M2 | SYSTOLIC BLOOD PRESSURE: 130 MMHG | OXYGEN SATURATION: 96 % | TEMPERATURE: 97.5 F

## 2024-11-19 DIAGNOSIS — E66.813 CLASS 3 SEVERE OBESITY DUE TO EXCESS CALORIES WITH SERIOUS COMORBIDITY AND BODY MASS INDEX (BMI) OF 45.0 TO 49.9 IN ADULT: ICD-10-CM

## 2024-11-19 DIAGNOSIS — F41.0 GENERALIZED ANXIETY DISORDER WITH PANIC ATTACKS: Primary | ICD-10-CM

## 2024-11-19 DIAGNOSIS — F41.1 GENERALIZED ANXIETY DISORDER WITH PANIC ATTACKS: Primary | ICD-10-CM

## 2024-11-19 DIAGNOSIS — E66.01 CLASS 3 SEVERE OBESITY DUE TO EXCESS CALORIES WITH SERIOUS COMORBIDITY AND BODY MASS INDEX (BMI) OF 45.0 TO 49.9 IN ADULT: ICD-10-CM

## 2024-11-19 DIAGNOSIS — F32.0 MILD SINGLE CURRENT EPISODE OF MAJOR DEPRESSIVE DISORDER (HCC): ICD-10-CM

## 2024-11-19 PROCEDURE — 99214 OFFICE O/P EST MOD 30 MIN: CPT | Performed by: STUDENT IN AN ORGANIZED HEALTH CARE EDUCATION/TRAINING PROGRAM

## 2024-11-19 PROCEDURE — 3075F SYST BP GE 130 - 139MM HG: CPT | Performed by: STUDENT IN AN ORGANIZED HEALTH CARE EDUCATION/TRAINING PROGRAM

## 2024-11-19 PROCEDURE — 3079F DIAST BP 80-89 MM HG: CPT | Performed by: STUDENT IN AN ORGANIZED HEALTH CARE EDUCATION/TRAINING PROGRAM

## 2024-11-19 RX ORDER — SERTRALINE HYDROCHLORIDE 100 MG/1
200 TABLET, FILM COATED ORAL DAILY
Qty: 180 TABLET | Refills: 1 | Status: SHIPPED | OUTPATIENT
Start: 2024-11-19

## 2024-11-19 RX ORDER — TRAZODONE HYDROCHLORIDE 150 MG/1
150 TABLET ORAL NIGHTLY
Qty: 30 TABLET | Refills: 5 | Status: SHIPPED | OUTPATIENT
Start: 2024-11-19

## 2024-11-19 RX ORDER — PHENTERMINE HYDROCHLORIDE 37.5 MG/1
37.5 TABLET ORAL
Qty: 30 TABLET | Refills: 0 | Status: SHIPPED | OUTPATIENT
Start: 2024-11-19 | End: 2024-12-19

## 2024-11-19 RX ORDER — LORAZEPAM 0.5 MG/1
0.5 TABLET ORAL EVERY 8 HOURS PRN
Qty: 25 TABLET | Refills: 5 | Status: SHIPPED | OUTPATIENT
Start: 2024-11-19 | End: 2024-12-19

## 2024-11-19 ASSESSMENT — ENCOUNTER SYMPTOMS
NAUSEA: 0
VOMITING: 0

## 2024-11-19 NOTE — PROGRESS NOTES
2024     Cynthia Thornton (:  1980) is a 44 y.o. female, here for evaluation of the following medical concerns:    HPI  Mood Disorder:  Patient presents for follow-up of depression and anxiety disorder. Current complaints include: Overall anxiety is much improved.  Has still had an occasional panic attack, but the Ativan is worked VERY well.  She denies any other symptoms. Symptoms/signs of leland: none.  External stressors: nothing new. Current treatment includes: Zoloft- 200 and benzodiazepine-Ativan 0.5 mg which she usually takes may be 3-4 times a week.  Medication side effects: none.    Review of Systems   Constitutional:  Negative for activity change, fatigue and unexpected weight change.   Gastrointestinal:  Negative for nausea and vomiting.   Endocrine: Negative for cold intolerance and heat intolerance.   Skin:  Negative for rash.   Neurological:  Negative for dizziness, seizures and light-headedness.   Psychiatric/Behavioral:  Negative for agitation, decreased concentration, dysphoric mood, self-injury, sleep disturbance and suicidal ideas. The patient is not nervous/anxious.        Prior to Visit Medications    Medication Sig Taking? Authorizing Provider   LORazepam (ATIVAN) 0.5 MG tablet Take 1 tablet by mouth every 8 hours as needed for Anxiety for up to 30 days. Max Daily Amount: 1.5 mg Yes Craig Jeronimo DO   sertraline (ZOLOFT) 100 MG tablet Take 2 tablets by mouth daily Yes Craig Jeronimo DO   phentermine (ADIPEX-P) 37.5 MG tablet Take 1 tablet by mouth every morning (before breakfast) for 30 days. Max Daily Amount: 37.5 mg Yes Craig Jeronimo DO   traZODone (DESYREL) 150 MG tablet Take 1 tablet by mouth nightly Yes Craig Jeronimo DO   gabapentin (NEURONTIN) 300 MG capsule Take 1 capsule by mouth 3 times daily for 180 days. Yes Craig Jeronimo DO   vitamin D (CHOLECALCIFEROL) 25 MCG (1000 UT) TABS tablet Take 1 tablet by mouth daily 2,000 Yes Provider, MD Ruben   albuterol sulfate HFA

## 2024-12-18 ENCOUNTER — OFFICE VISIT (OUTPATIENT)
Dept: PRIMARY CARE CLINIC | Age: 44
End: 2024-12-18
Payer: COMMERCIAL

## 2024-12-18 VITALS
WEIGHT: 275 LBS | HEIGHT: 66 IN | DIASTOLIC BLOOD PRESSURE: 86 MMHG | HEART RATE: 84 BPM | BODY MASS INDEX: 44.2 KG/M2 | SYSTOLIC BLOOD PRESSURE: 136 MMHG | TEMPERATURE: 96.9 F | OXYGEN SATURATION: 97 %

## 2024-12-18 DIAGNOSIS — F41.1 GENERALIZED ANXIETY DISORDER WITH PANIC ATTACKS: Primary | ICD-10-CM

## 2024-12-18 DIAGNOSIS — E66.01 CLASS 3 SEVERE OBESITY DUE TO EXCESS CALORIES WITH SERIOUS COMORBIDITY AND BODY MASS INDEX (BMI) OF 45.0 TO 49.9 IN ADULT: ICD-10-CM

## 2024-12-18 DIAGNOSIS — E66.813 CLASS 3 SEVERE OBESITY DUE TO EXCESS CALORIES WITH SERIOUS COMORBIDITY AND BODY MASS INDEX (BMI) OF 45.0 TO 49.9 IN ADULT: ICD-10-CM

## 2024-12-18 DIAGNOSIS — F41.0 GENERALIZED ANXIETY DISORDER WITH PANIC ATTACKS: Primary | ICD-10-CM

## 2024-12-18 DIAGNOSIS — J45.30 MILD PERSISTENT ASTHMA WITHOUT COMPLICATION: ICD-10-CM

## 2024-12-18 PROCEDURE — 99214 OFFICE O/P EST MOD 30 MIN: CPT | Performed by: STUDENT IN AN ORGANIZED HEALTH CARE EDUCATION/TRAINING PROGRAM

## 2024-12-18 PROCEDURE — 3075F SYST BP GE 130 - 139MM HG: CPT | Performed by: STUDENT IN AN ORGANIZED HEALTH CARE EDUCATION/TRAINING PROGRAM

## 2024-12-18 PROCEDURE — 3079F DIAST BP 80-89 MM HG: CPT | Performed by: STUDENT IN AN ORGANIZED HEALTH CARE EDUCATION/TRAINING PROGRAM

## 2024-12-18 RX ORDER — PHENTERMINE HYDROCHLORIDE 37.5 MG/1
37.5 TABLET ORAL
Qty: 30 TABLET | Refills: 0 | Status: SHIPPED | OUTPATIENT
Start: 2024-12-18 | End: 2025-01-17

## 2024-12-18 RX ORDER — ALPRAZOLAM 0.5 MG
0.5 TABLET ORAL 3 TIMES DAILY PRN
Qty: 20 TABLET | Refills: 5 | Status: SHIPPED | OUTPATIENT
Start: 2024-12-18 | End: 2025-06-16

## 2024-12-18 RX ORDER — AZELASTINE 1 MG/ML
1 SPRAY, METERED NASAL 2 TIMES DAILY
Qty: 60 ML | Refills: 1 | Status: SHIPPED | OUTPATIENT
Start: 2024-12-18

## 2024-12-18 ASSESSMENT — ENCOUNTER SYMPTOMS
COUGH: 0
SHORTNESS OF BREATH: 0
CHEST TIGHTNESS: 0

## 2024-12-18 NOTE — TELEPHONE ENCOUNTER
Medication:   Requested Prescriptions     Pending Prescriptions Disp Refills    albuterol sulfate HFA (PROVENTIL;VENTOLIN;PROAIR) 108 (90 Base) MCG/ACT inhaler [Pharmacy Med Name: ALBUTEROL HFA 90 MCG INHALER] 25.5 g 1     Sig: INHALE 2 PUFFS BY MOUTH EVERY 6 HOURS AS NEEDED FOR WHEEZING OR SHORTNESS OF BREATH        Last Filled:  7/15/24    Patient Phone Number: [phone] (home)     Last appt: 12/18/2024   Next appt: 1/21/2025    Last OARRS:        No data to display

## 2024-12-18 NOTE — PROGRESS NOTES
WHEEZING OR SHORTNESS OF BREATH Yes Craig Jeronimo DO   fluticasone (FLOVENT HFA) 44 MCG/ACT inhaler Inhale 2 puffs into the lungs 2 times daily Yes Craig Jeronimo DO   metoprolol tartrate (LOPRESSOR) 50 MG tablet Take 1 tablet by mouth 2 times daily Yes Craig Jeronimo DO   montelukast (SINGULAIR) 10 MG tablet TAKE ONE TABLET BY MOUTH ONCE NIGHTLY Yes Craig Jeronimo DO   cyclobenzaprine (FLEXERIL) 5 MG tablet TAKE ONE TABLET BY MOUTH THREE TIMES A DAY AS NEEDED FOR MUSCLE SPASMS Yes Craig Jeronimo DO   famotidine (PEPCID) 20 MG tablet TAKE ONE TABLET BY MOUTH TWICE A DAY Yes Craig Jeronimo DO   omeprazole (PRILOSEC) 40 MG delayed release capsule Take 1 capsule by mouth daily Yes Grace Jaimes, APRN - CNP   cetirizine (ZYRTEC) 10 MG tablet Take 1 tablet by mouth daily Yes Provider, MD Ruben        Social History     Tobacco Use    Smoking status: Never    Smokeless tobacco: Never    Tobacco comments:     Never   Substance Use Topics    Alcohol use: No     Comment: occ        Vitals:    12/18/24 1022   BP: 136/86   Pulse: 84   Temp: 96.9 °F (36.1 °C)   TempSrc: Temporal   SpO2: 97%   Weight: 124.7 kg (275 lb)   Height: 1.676 m (5' 6\")     Estimated body mass index is 44.39 kg/m² as calculated from the following:    Height as of this encounter: 1.676 m (5' 6\").    Weight as of this encounter: 124.7 kg (275 lb).    Physical Exam  Vitals reviewed.   Constitutional:       Appearance: Normal appearance. She is obese.   HENT:      Head: Normocephalic and atraumatic.      Nose: Nose normal.      Mouth/Throat:      Pharynx: Oropharynx is clear.   Eyes:      Conjunctiva/sclera: Conjunctivae normal.   Cardiovascular:      Rate and Rhythm: Normal rate and regular rhythm.   Pulmonary:      Effort: Pulmonary effort is normal.   Skin:     General: Skin is warm and dry.      Capillary Refill: Capillary refill takes less than 2 seconds.   Neurological:      Mental Status: She is alert. Mental status is at baseline.   Psychiatric:

## 2024-12-19 RX ORDER — ALBUTEROL SULFATE 90 UG/1
INHALANT RESPIRATORY (INHALATION)
Qty: 25.5 G | Refills: 1 | Status: SHIPPED | OUTPATIENT
Start: 2024-12-19

## 2025-01-02 DIAGNOSIS — M62.838 MUSCLE SPASM: Primary | ICD-10-CM

## 2025-01-02 RX ORDER — CYCLOBENZAPRINE HCL 5 MG
5 TABLET ORAL 3 TIMES DAILY PRN
Qty: 60 TABLET | Refills: 5 | Status: SHIPPED | OUTPATIENT
Start: 2025-01-02

## 2025-01-02 NOTE — TELEPHONE ENCOUNTER
Medication:   Requested Prescriptions     Pending Prescriptions Disp Refills    cyclobenzaprine (FLEXERIL) 5 MG tablet [Pharmacy Med Name: CYCLOBENZAPRINE 5 MG TABLET] 60 tablet 5     Sig: TAKE ONE TABLET BY MOUTH THREE TIMES A DAY AS NEEDED FOR MUSCLE SPASMS        Last Filled:  1/15/2024    Patient Phone Number: [phone] (home)     Last appt: 12/18/2024   Next appt: 1/21/2025    Last OARRS:        No data to display

## 2025-01-06 ENCOUNTER — TELEMEDICINE ON DEMAND (OUTPATIENT)
Age: 45
End: 2025-01-06
Payer: COMMERCIAL

## 2025-01-06 DIAGNOSIS — M54.40 ACUTE RIGHT-SIDED LOW BACK PAIN WITH SCIATICA, SCIATICA LATERALITY UNSPECIFIED: Primary | ICD-10-CM

## 2025-01-06 PROCEDURE — 99212 OFFICE O/P EST SF 10 MIN: CPT | Performed by: NURSE PRACTITIONER

## 2025-01-06 RX ORDER — METHYLPREDNISOLONE 4 MG/1
TABLET ORAL
Qty: 1 KIT | Refills: 0 | Status: SHIPPED | OUTPATIENT
Start: 2025-01-06 | End: 2025-01-12

## 2025-01-06 ASSESSMENT — ENCOUNTER SYMPTOMS
BACK PAIN: 1
BOWEL INCONTINENCE: 0

## 2025-01-06 NOTE — PROGRESS NOTES
Cynthia Thornton (:  1980) is a Established patient, here for evaluation of the following:    Lower Back Pain (Shoveled driveway and felt a pulling in her lower back on the right side near her buttocks)       Assessment & Plan:  Below is the assessment and plan developed based on review of pertinent history, physical exam, labs, studies, and medications.  1. Acute right-sided low back pain with sciatica, sciatica laterality unspecified  -     methylPREDNISolone (MEDROL DOSEPACK) 4 MG tablet; Take by mouth., Disp-1 kit, R-0Normal  Ice, gentle exercises (provided), continue with tylenol and flexeril. F/u with Dr. Jeronimo if symptoms worsen or fail to improve.  The patient would benefit from future follow up with their usual PCP. As of the end of their Virtualist Visit today, follow up visit status is as follows: Patient to follow up as previously instructed by PCP    Return if symptoms worsen or fail to improve.    Subjective:   Back Pain  This is a new problem. The current episode started today. The problem occurs constantly. The problem has been gradually worsening since onset. The pain is present in the lumbar spine and gluteal. The quality of the pain is described as aching. Radiates to: right buttock. The pain is severe. The pain is The same all the time. The symptoms are aggravated by bending, coughing and position. Pertinent negatives include no bladder incontinence, bowel incontinence, numbness, paresis, paresthesias, pelvic pain, perianal numbness, tingling or weakness. Risk factors include obesity and sedentary lifestyle. She has tried NSAIDs and muscle relaxant (rapid release tylenol) for the symptoms. The treatment provided no relief.     Review of Systems   Gastrointestinal:  Negative for bowel incontinence.   Genitourinary:  Negative for bladder incontinence and pelvic pain.   Musculoskeletal:  Positive for back pain.   Neurological:  Negative for tingling, weakness, numbness and paresthesias.

## 2025-02-06 DIAGNOSIS — J45.30 MILD PERSISTENT ASTHMA WITHOUT COMPLICATION: ICD-10-CM

## 2025-02-06 RX ORDER — MONTELUKAST SODIUM 10 MG/1
TABLET ORAL
Qty: 90 TABLET | Refills: 3 | Status: SHIPPED | OUTPATIENT
Start: 2025-02-06

## 2025-02-06 NOTE — TELEPHONE ENCOUNTER
Medication:   Requested Prescriptions     Pending Prescriptions Disp Refills    montelukast (SINGULAIR) 10 MG tablet [Pharmacy Med Name: MONTELUKAST SOD 10 MG TABLET] 90 tablet 3     Sig: TAKE ONE TABLET BY MOUTH ONCE NIGHTLY        Last Filled:    1/15/24  Patient Phone Number: [phone] (home)     Last appt: 12/18/2024   Return in about 4 weeks (around 1/15/2025) for Weight.  Next appt: Visit date not found    Last OARRS:        No data to display

## 2025-04-02 DIAGNOSIS — I10 ESSENTIAL HYPERTENSION: ICD-10-CM

## 2025-04-02 RX ORDER — METOPROLOL TARTRATE 50 MG
50 TABLET ORAL 2 TIMES DAILY
Qty: 180 TABLET | Refills: 1 | OUTPATIENT
Start: 2025-04-02

## 2025-04-02 NOTE — TELEPHONE ENCOUNTER
Medication:   Requested Prescriptions     Pending Prescriptions Disp Refills    metoprolol tartrate (LOPRESSOR) 50 MG tablet [Pharmacy Med Name: METOPROLOL TARTRATE 50 MG TAB] 180 tablet 1     Sig: TAKE 1 TABLET BY MOUTH 2 TIMES A DAY      5/21/24  Last Filled:      Patient Phone Number: [phone] (home)     Last appt: 12/18/2024   Next appt: Visit date not found    Last OARRS:        No data to display

## 2025-04-12 SDOH — ECONOMIC STABILITY: FOOD INSECURITY: WITHIN THE PAST 12 MONTHS, YOU WORRIED THAT YOUR FOOD WOULD RUN OUT BEFORE YOU GOT MONEY TO BUY MORE.: NEVER TRUE

## 2025-04-12 SDOH — ECONOMIC STABILITY: FOOD INSECURITY: WITHIN THE PAST 12 MONTHS, THE FOOD YOU BOUGHT JUST DIDN'T LAST AND YOU DIDN'T HAVE MONEY TO GET MORE.: NEVER TRUE

## 2025-04-12 SDOH — ECONOMIC STABILITY: TRANSPORTATION INSECURITY
IN THE PAST 12 MONTHS, HAS THE LACK OF TRANSPORTATION KEPT YOU FROM MEDICAL APPOINTMENTS OR FROM GETTING MEDICATIONS?: NO

## 2025-04-12 SDOH — ECONOMIC STABILITY: INCOME INSECURITY: IN THE LAST 12 MONTHS, WAS THERE A TIME WHEN YOU WERE NOT ABLE TO PAY THE MORTGAGE OR RENT ON TIME?: NO

## 2025-04-12 ASSESSMENT — PATIENT HEALTH QUESTIONNAIRE - PHQ9
10. IF YOU CHECKED OFF ANY PROBLEMS, HOW DIFFICULT HAVE THESE PROBLEMS MADE IT FOR YOU TO DO YOUR WORK, TAKE CARE OF THINGS AT HOME, OR GET ALONG WITH OTHER PEOPLE: SOMEWHAT DIFFICULT
SUM OF ALL RESPONSES TO PHQ QUESTIONS 1-9: 8
5. POOR APPETITE OR OVEREATING: SEVERAL DAYS
SUM OF ALL RESPONSES TO PHQ QUESTIONS 1-9: 8
SUM OF ALL RESPONSES TO PHQ QUESTIONS 1-9: 8
3. TROUBLE FALLING OR STAYING ASLEEP: NEARLY EVERY DAY
6. FEELING BAD ABOUT YOURSELF - OR THAT YOU ARE A FAILURE OR HAVE LET YOURSELF OR YOUR FAMILY DOWN: NOT AT ALL
7. TROUBLE CONCENTRATING ON THINGS, SUCH AS READING THE NEWSPAPER OR WATCHING TELEVISION: SEVERAL DAYS
SUM OF ALL RESPONSES TO PHQ QUESTIONS 1-9: 8
4. FEELING TIRED OR HAVING LITTLE ENERGY: MORE THAN HALF THE DAYS
9. THOUGHTS THAT YOU WOULD BE BETTER OFF DEAD, OR OF HURTING YOURSELF: NOT AT ALL
8. MOVING OR SPEAKING SO SLOWLY THAT OTHER PEOPLE COULD HAVE NOTICED. OR THE OPPOSITE - BEING SO FIDGETY OR RESTLESS THAT YOU HAVE BEEN MOVING AROUND A LOT MORE THAN USUAL: NOT AT ALL
4. FEELING TIRED OR HAVING LITTLE ENERGY: MORE THAN HALF THE DAYS
2. FEELING DOWN, DEPRESSED OR HOPELESS: NOT AT ALL
9. THOUGHTS THAT YOU WOULD BE BETTER OFF DEAD, OR OF HURTING YOURSELF: NOT AT ALL
6. FEELING BAD ABOUT YOURSELF - OR THAT YOU ARE A FAILURE OR HAVE LET YOURSELF OR YOUR FAMILY DOWN: NOT AT ALL
7. TROUBLE CONCENTRATING ON THINGS, SUCH AS READING THE NEWSPAPER OR WATCHING TELEVISION: SEVERAL DAYS
8. MOVING OR SPEAKING SO SLOWLY THAT OTHER PEOPLE COULD HAVE NOTICED. OR THE OPPOSITE, BEING SO FIGETY OR RESTLESS THAT YOU HAVE BEEN MOVING AROUND A LOT MORE THAN USUAL: NOT AT ALL
3. TROUBLE FALLING OR STAYING ASLEEP: NEARLY EVERY DAY
SUM OF ALL RESPONSES TO PHQ QUESTIONS 1-9: 8
5. POOR APPETITE OR OVEREATING: SEVERAL DAYS
10. IF YOU CHECKED OFF ANY PROBLEMS, HOW DIFFICULT HAVE THESE PROBLEMS MADE IT FOR YOU TO DO YOUR WORK, TAKE CARE OF THINGS AT HOME, OR GET ALONG WITH OTHER PEOPLE: SOMEWHAT DIFFICULT
1. LITTLE INTEREST OR PLEASURE IN DOING THINGS: SEVERAL DAYS
2. FEELING DOWN, DEPRESSED OR HOPELESS: NOT AT ALL
1. LITTLE INTEREST OR PLEASURE IN DOING THINGS: SEVERAL DAYS

## 2025-04-15 ENCOUNTER — OFFICE VISIT (OUTPATIENT)
Dept: PRIMARY CARE CLINIC | Age: 45
End: 2025-04-15
Payer: COMMERCIAL

## 2025-04-15 ENCOUNTER — TELEPHONE (OUTPATIENT)
Dept: PRIMARY CARE CLINIC | Age: 45
End: 2025-04-15

## 2025-04-15 VITALS
BODY MASS INDEX: 46.16 KG/M2 | DIASTOLIC BLOOD PRESSURE: 72 MMHG | HEART RATE: 73 BPM | WEIGHT: 287.2 LBS | HEIGHT: 66 IN | TEMPERATURE: 97.4 F | SYSTOLIC BLOOD PRESSURE: 135 MMHG

## 2025-04-15 DIAGNOSIS — F32.0 MILD SINGLE CURRENT EPISODE OF MAJOR DEPRESSIVE DISORDER: ICD-10-CM

## 2025-04-15 DIAGNOSIS — M54.32 LEFT SIDED SCIATICA: ICD-10-CM

## 2025-04-15 DIAGNOSIS — Z23 NEED FOR TETANUS BOOSTER: ICD-10-CM

## 2025-04-15 DIAGNOSIS — F51.04 PSYCHOPHYSIOLOGICAL INSOMNIA: ICD-10-CM

## 2025-04-15 DIAGNOSIS — Z23 NEED FOR PNEUMOCOCCAL 20-VALENT CONJUGATE VACCINATION: ICD-10-CM

## 2025-04-15 DIAGNOSIS — E66.813 CLASS 3 SEVERE OBESITY DUE TO EXCESS CALORIES WITH SERIOUS COMORBIDITY AND BODY MASS INDEX (BMI) OF 45.0 TO 49.9 IN ADULT: ICD-10-CM

## 2025-04-15 DIAGNOSIS — I10 ESSENTIAL HYPERTENSION: Primary | ICD-10-CM

## 2025-04-15 DIAGNOSIS — J45.30 MILD PERSISTENT ASTHMA WITHOUT COMPLICATION: ICD-10-CM

## 2025-04-15 DIAGNOSIS — M62.838 MUSCLE SPASM: ICD-10-CM

## 2025-04-15 DIAGNOSIS — F41.1 GENERALIZED ANXIETY DISORDER WITH PANIC ATTACKS: ICD-10-CM

## 2025-04-15 DIAGNOSIS — F41.0 GENERALIZED ANXIETY DISORDER WITH PANIC ATTACKS: ICD-10-CM

## 2025-04-15 PROCEDURE — 90472 IMMUNIZATION ADMIN EACH ADD: CPT | Performed by: STUDENT IN AN ORGANIZED HEALTH CARE EDUCATION/TRAINING PROGRAM

## 2025-04-15 PROCEDURE — 3078F DIAST BP <80 MM HG: CPT | Performed by: STUDENT IN AN ORGANIZED HEALTH CARE EDUCATION/TRAINING PROGRAM

## 2025-04-15 PROCEDURE — 99215 OFFICE O/P EST HI 40 MIN: CPT | Performed by: STUDENT IN AN ORGANIZED HEALTH CARE EDUCATION/TRAINING PROGRAM

## 2025-04-15 PROCEDURE — 90471 IMMUNIZATION ADMIN: CPT | Performed by: STUDENT IN AN ORGANIZED HEALTH CARE EDUCATION/TRAINING PROGRAM

## 2025-04-15 PROCEDURE — 3075F SYST BP GE 130 - 139MM HG: CPT | Performed by: STUDENT IN AN ORGANIZED HEALTH CARE EDUCATION/TRAINING PROGRAM

## 2025-04-15 PROCEDURE — 90677 PCV20 VACCINE IM: CPT | Performed by: STUDENT IN AN ORGANIZED HEALTH CARE EDUCATION/TRAINING PROGRAM

## 2025-04-15 PROCEDURE — 90715 TDAP VACCINE 7 YRS/> IM: CPT | Performed by: STUDENT IN AN ORGANIZED HEALTH CARE EDUCATION/TRAINING PROGRAM

## 2025-04-15 RX ORDER — CYCLOBENZAPRINE HCL 5 MG
5 TABLET ORAL 3 TIMES DAILY PRN
Qty: 60 TABLET | Refills: 5 | Status: SHIPPED | OUTPATIENT
Start: 2025-04-15

## 2025-04-15 RX ORDER — SERTRALINE HYDROCHLORIDE 100 MG/1
200 TABLET, FILM COATED ORAL DAILY
Qty: 180 TABLET | Refills: 1 | Status: SHIPPED | OUTPATIENT
Start: 2025-04-15

## 2025-04-15 RX ORDER — ALPRAZOLAM 0.5 MG
0.5 TABLET ORAL NIGHTLY PRN
Qty: 25 TABLET | Refills: 3 | Status: SHIPPED | OUTPATIENT
Start: 2025-04-15 | End: 2025-10-12

## 2025-04-15 RX ORDER — METOPROLOL TARTRATE 50 MG
50 TABLET ORAL 2 TIMES DAILY
Qty: 180 TABLET | Refills: 1 | Status: SHIPPED | OUTPATIENT
Start: 2025-04-15

## 2025-04-15 RX ORDER — AZELASTINE 1 MG/ML
1 SPRAY, METERED NASAL 2 TIMES DAILY
Qty: 60 ML | Refills: 1 | Status: SHIPPED | OUTPATIENT
Start: 2025-04-15

## 2025-04-15 RX ORDER — TRAZODONE HYDROCHLORIDE 150 MG/1
150 TABLET ORAL NIGHTLY
Qty: 30 TABLET | Refills: 5 | Status: CANCELLED | OUTPATIENT
Start: 2025-04-15

## 2025-04-15 RX ORDER — FLUTICASONE PROPIONATE 44 UG/1
2 AEROSOL, METERED RESPIRATORY (INHALATION) 2 TIMES DAILY
Qty: 10.6 G | Refills: 5 | Status: SHIPPED | OUTPATIENT
Start: 2025-04-15

## 2025-04-15 RX ORDER — ALBUTEROL SULFATE 90 UG/1
2 INHALANT RESPIRATORY (INHALATION) EVERY 6 HOURS PRN
Qty: 25.5 G | Refills: 1 | Status: SHIPPED | OUTPATIENT
Start: 2025-04-15

## 2025-04-15 RX ORDER — FAMOTIDINE 20 MG/1
20 TABLET, FILM COATED ORAL 2 TIMES DAILY
Qty: 60 TABLET | Refills: 5 | Status: SHIPPED | OUTPATIENT
Start: 2025-04-15

## 2025-04-15 RX ORDER — GABAPENTIN 300 MG/1
300 CAPSULE ORAL 3 TIMES DAILY
Qty: 90 CAPSULE | Refills: 5 | Status: SHIPPED | OUTPATIENT
Start: 2025-04-15 | End: 2025-10-12

## 2025-04-15 SDOH — ECONOMIC STABILITY: FOOD INSECURITY: WITHIN THE PAST 12 MONTHS, YOU WORRIED THAT YOUR FOOD WOULD RUN OUT BEFORE YOU GOT MONEY TO BUY MORE.: NEVER TRUE

## 2025-04-15 SDOH — ECONOMIC STABILITY: FOOD INSECURITY: WITHIN THE PAST 12 MONTHS, THE FOOD YOU BOUGHT JUST DIDN'T LAST AND YOU DIDN'T HAVE MONEY TO GET MORE.: NEVER TRUE

## 2025-04-15 ASSESSMENT — ENCOUNTER SYMPTOMS
NAUSEA: 0
CHEST TIGHTNESS: 0
VOMITING: 0
SHORTNESS OF BREATH: 0
COUGH: 0

## 2025-04-15 NOTE — TELEPHONE ENCOUNTER
Pt states she has to schedule ortho appointment around her mothers at home visit and did not have schedule at the time of being let go. States she weill call ortho directly to get scheduled.

## 2025-04-15 NOTE — PROGRESS NOTES
4/15/2025     Cynthia Thornton (:  1980) is a 44 y.o. female, here for evaluation of the following medical concerns:    HPI  Asthma:  Current treatment includes inhaled steroids.  Using preventive medication(s) consistently: yes.  Residual symptoms: none.  Patient denies any other symptoms.  She requires her rescue inhaler 3 time(s) per month.    Hypertension:  Home blood pressure monitoring: No.  She is not adherent to a low sodium diet. Patient denies chest pain, shortness of breath, headache, lightheadedness, blurred vision, peripheral edema, palpitations, dry cough, and fatigue.  Antihypertensive medication side effects: no medication side effects noted.  Use of agents associated with hypertension: none.                                        Sodium (mmol/L)   Date Value   2023 135 (L)    BUN (mg/dL)   Date Value   2023 15    Glucose (mg/dL)   Date Value   2023 94      Potassium (mmol/L)   Date Value   2023 4.8    Creatinine (mg/dL)   Date Value   2023 0.8         Insomnia:  Current treatment: avoiding heavy meal before bedtime, avoiding long naps during the day, avoiding use of electronic devices before bedtime, avoiding vigorous physical exercise prior to bed, decreasing caffeine consumption, going to sleep at the same time each night, limiting alcohol consumption, regular daily exercise, and prescription sleep aid- benzodiazepine- Xanax , which has been effective.  Medication side effects: none.    Mood Disorder:  Patient presents for follow-up of depression and anxiety disorder. Current complaints include: none.  She denies any other symptoms. Symptoms/signs of leland: none.  External stressors: nothing new. Current treatment includes: Zoloft- 200.  Medication side effects: none.    Review of Systems   Constitutional:  Negative for activity change, fatigue and unexpected weight change.   Eyes:  Negative for visual disturbance.   Respiratory:  Negative for cough,

## 2025-04-19 ENCOUNTER — PATIENT MESSAGE (OUTPATIENT)
Dept: PRIMARY CARE CLINIC | Age: 45
End: 2025-04-19

## 2025-04-21 RX ORDER — METHYLPREDNISOLONE 4 MG/1
TABLET ORAL
Qty: 1 KIT | Refills: 0 | Status: SHIPPED | OUTPATIENT
Start: 2025-04-21

## 2025-06-26 NOTE — PROGRESS NOTES
moderate with anxious distress (HCC) 11/07/2017    Pyloric stenosis       Past Surgical History:     Past Surgical History:   Procedure Laterality Date    WISDOM TOOTH EXTRACTION  early 20's    x 4     Current Medications:     Current Outpatient Medications:     methylPREDNISolone (MEDROL DOSEPACK) 4 MG tablet, Take by mouth., Disp: 1 kit, Rfl: 0    metoprolol tartrate (LOPRESSOR) 50 MG tablet, Take 1 tablet by mouth 2 times daily, Disp: 180 tablet, Rfl: 1    sertraline (ZOLOFT) 100 MG tablet, Take 2 tablets by mouth daily, Disp: 180 tablet, Rfl: 1    gabapentin (NEURONTIN) 300 MG capsule, Take 1 capsule by mouth 3 times daily for 180 days., Disp: 90 capsule, Rfl: 5    fluticasone (FLOVENT HFA) 44 MCG/ACT inhaler, Inhale 2 puffs into the lungs 2 times daily, Disp: 10.6 g, Rfl: 5    ALPRAZolam (XANAX) 0.5 MG tablet, Take 1 tablet by mouth nightly as needed for Sleep for up to 180 days. Max Daily Amount: 0.5 mg, Disp: 25 tablet, Rfl: 3    azelastine (ASTELIN) 0.1 % nasal spray, 1 spray by Nasal route 2 times daily Use in each nostril as directed, Disp: 60 mL, Rfl: 1    famotidine (PEPCID) 20 MG tablet, Take 1 tablet by mouth 2 times daily, Disp: 60 tablet, Rfl: 5    albuterol sulfate HFA (PROVENTIL;VENTOLIN;PROAIR) 108 (90 Base) MCG/ACT inhaler, Inhale 2 puffs into the lungs every 6 hours as needed for Wheezing, Disp: 25.5 g, Rfl: 1    cyclobenzaprine (FLEXERIL) 5 MG tablet, Take 1 tablet by mouth 3 times daily as needed for Muscle spasms, Disp: 60 tablet, Rfl: 5    montelukast (SINGULAIR) 10 MG tablet, TAKE ONE TABLET BY MOUTH ONCE NIGHTLY, Disp: 90 tablet, Rfl: 3    traZODone (DESYREL) 150 MG tablet, Take 1 tablet by mouth nightly, Disp: 30 tablet, Rfl: 5    vitamin D (CHOLECALCIFEROL) 25 MCG (1000 UT) TABS tablet, Take 1 tablet by mouth daily 2,000, Disp: , Rfl:     omeprazole (PRILOSEC) 40 MG delayed release capsule, Take 1 capsule by mouth daily, Disp: 30 capsule, Rfl: 3    cetirizine (ZYRTEC) 10 MG tablet, Take

## 2025-06-30 ENCOUNTER — OFFICE VISIT (OUTPATIENT)
Dept: ORTHOPEDIC SURGERY | Age: 45
End: 2025-06-30
Payer: COMMERCIAL

## 2025-06-30 VITALS — BODY MASS INDEX: 46.12 KG/M2 | WEIGHT: 287 LBS | HEIGHT: 66 IN

## 2025-06-30 DIAGNOSIS — G89.29 CHRONIC LOW BACK PAIN WITH LEFT-SIDED SCIATICA, UNSPECIFIED BACK PAIN LATERALITY: Primary | ICD-10-CM

## 2025-06-30 DIAGNOSIS — M54.42 CHRONIC LOW BACK PAIN WITH LEFT-SIDED SCIATICA, UNSPECIFIED BACK PAIN LATERALITY: Primary | ICD-10-CM

## 2025-06-30 DIAGNOSIS — M47.816 LUMBAR SPONDYLOSIS: ICD-10-CM

## 2025-06-30 PROCEDURE — 99204 OFFICE O/P NEW MOD 45 MIN: CPT | Performed by: PHYSICIAN ASSISTANT

## 2025-06-30 RX ORDER — MELOXICAM 15 MG/1
15 TABLET ORAL DAILY
Qty: 30 TABLET | Refills: 0 | Status: SHIPPED | OUTPATIENT
Start: 2025-06-30

## 2025-07-01 DIAGNOSIS — F41.0 GENERALIZED ANXIETY DISORDER WITH PANIC ATTACKS: ICD-10-CM

## 2025-07-01 DIAGNOSIS — F32.0 MILD SINGLE CURRENT EPISODE OF MAJOR DEPRESSIVE DISORDER: ICD-10-CM

## 2025-07-01 DIAGNOSIS — F41.1 GENERALIZED ANXIETY DISORDER WITH PANIC ATTACKS: ICD-10-CM

## 2025-07-01 RX ORDER — SERTRALINE HYDROCHLORIDE 100 MG/1
200 TABLET, FILM COATED ORAL DAILY
Qty: 180 TABLET | Refills: 1 | Status: SHIPPED | OUTPATIENT
Start: 2025-07-01

## 2025-07-01 RX ORDER — TRAZODONE HYDROCHLORIDE 150 MG/1
150 TABLET ORAL NIGHTLY
Qty: 30 TABLET | Refills: 5 | Status: SHIPPED | OUTPATIENT
Start: 2025-07-01

## 2025-07-01 NOTE — TELEPHONE ENCOUNTER
Medication:   Requested Prescriptions     Pending Prescriptions Disp Refills    traZODone (DESYREL) 150 MG tablet [Pharmacy Med Name: traZODone 150 MG TABLET] 30 tablet 5     Sig: TAKE ONE TABLET BY MOUTH ONCE NIGHTLY    sertraline (ZOLOFT) 100 MG tablet [Pharmacy Med Name: SERTRALINE  MG TABLET] 180 tablet 1     Sig: TAKE 2 TABLETS BY MOUTH DAILY        Last Filled:  11/19/24, 4/15/25    Patient Phone Number: [phone] (home)     Last appt: 4/15/2025   Next appt: 8/15/2025    Last OARRS:        No data to display

## 2025-07-17 ENCOUNTER — TELEPHONE (OUTPATIENT)
Dept: ORTHOPEDIC SURGERY | Age: 45
End: 2025-07-17

## 2025-07-17 NOTE — TELEPHONE ENCOUNTER
Left a voicemail for Cynthia Thornton regarding MRI Lumbar Spine approval and authorization being valid until 12/31/2025.  Patient was instructed that their MRI needs to be scheduled at [facility]. The patient was instructed to contact the facility to schedule  at [phone].    A follow up appointment has been scheduled for Monday 7/21/2025 at the [place] office.     The patient was provided contact information should the need arise to reschedule any of the appointments.

## 2025-07-19 ENCOUNTER — HOSPITAL ENCOUNTER (OUTPATIENT)
Dept: MRI IMAGING | Age: 45
Discharge: HOME | End: 2025-07-19
Payer: COMMERCIAL

## 2025-07-19 DIAGNOSIS — M47.816 LUMBAR SPONDYLOSIS: ICD-10-CM

## 2025-07-19 DIAGNOSIS — G89.29 CHRONIC LOW BACK PAIN WITH LEFT-SIDED SCIATICA, UNSPECIFIED BACK PAIN LATERALITY: ICD-10-CM

## 2025-07-19 DIAGNOSIS — M54.42 CHRONIC LOW BACK PAIN WITH LEFT-SIDED SCIATICA, UNSPECIFIED BACK PAIN LATERALITY: ICD-10-CM

## 2025-07-19 PROCEDURE — 72148 MRI LUMBAR SPINE W/O DYE: CPT

## 2025-07-21 ENCOUNTER — OFFICE VISIT (OUTPATIENT)
Dept: ORTHOPEDIC SURGERY | Age: 45
End: 2025-07-21
Payer: COMMERCIAL

## 2025-07-21 VITALS — WEIGHT: 287 LBS | BODY MASS INDEX: 46.12 KG/M2 | HEIGHT: 66 IN

## 2025-07-21 DIAGNOSIS — M48.061 LUMBAR FORAMINAL STENOSIS: ICD-10-CM

## 2025-07-21 DIAGNOSIS — G89.29 CHRONIC LOW BACK PAIN WITH LEFT-SIDED SCIATICA, UNSPECIFIED BACK PAIN LATERALITY: Primary | ICD-10-CM

## 2025-07-21 DIAGNOSIS — M54.42 CHRONIC LOW BACK PAIN WITH LEFT-SIDED SCIATICA, UNSPECIFIED BACK PAIN LATERALITY: Primary | ICD-10-CM

## 2025-07-21 DIAGNOSIS — M51.26 PROTRUSION OF LUMBAR INTERVERTEBRAL DISC: ICD-10-CM

## 2025-07-21 PROCEDURE — 99214 OFFICE O/P EST MOD 30 MIN: CPT | Performed by: PHYSICIAN ASSISTANT

## 2025-07-21 PROCEDURE — L0642 LO SAG RI AN/POS PNL PRE OTS: HCPCS | Performed by: PHYSICIAN ASSISTANT

## 2025-07-21 RX ORDER — LIDOCAINE 50 MG/G
PATCH TOPICAL
Qty: 30 PATCH | Refills: 0 | Status: CANCELLED | OUTPATIENT
Start: 2025-07-21

## 2025-07-21 RX ORDER — MELOXICAM 15 MG/1
15 TABLET ORAL DAILY PRN
Qty: 30 TABLET | Refills: 0 | Status: SHIPPED | OUTPATIENT
Start: 2025-07-21

## 2025-07-21 NOTE — PROGRESS NOTES
FOLLOW UP: SPINE    7/21/2025     CHIEF COMPLAINT: Chronic low back and left leg pain, f/u MRI    HISTORY OF PRESENT ILLNESS:              The patient is a 44 y.o. female H/o asthma, HTN, depression initially referred by Craig Jeronimo DO here to review lumbar MRI for chronic worsening low back and radiating left leg pain.  She reports chronic aching/burning left low back/buttock pain radiating into the left posterior thigh to the knee.  Her symptoms are increased with any prolonged positioning--such as sitting or standing.  She reports some relief with resting flat.  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  Conservative care includes NSAIDs/MOBIC, MDP, Flexeril, gabapentin 300 3 times daily.  She has not yet started PT.  She does report improvement at this time up to 80%.  She still has some residual low back and radiating left leg pain particularly in the buttock region. Currently denies any progressive extremity weakness.  Denies any recent bowel or bladder dysfunction or saddle anesthesia.  Denies any recent injury or trauma.  Denies any recent fevers chills infections.  Overall much improved.    The pain assessment was noted & reviewed in the medical record today.     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

## 2025-08-11 ENCOUNTER — OFFICE VISIT (OUTPATIENT)
Dept: PRIMARY CARE CLINIC | Age: 45
End: 2025-08-11
Payer: COMMERCIAL

## 2025-08-11 VITALS
DIASTOLIC BLOOD PRESSURE: 85 MMHG | HEART RATE: 86 BPM | BODY MASS INDEX: 45.64 KG/M2 | TEMPERATURE: 98.1 F | WEIGHT: 284 LBS | SYSTOLIC BLOOD PRESSURE: 137 MMHG | HEIGHT: 66 IN

## 2025-08-11 DIAGNOSIS — K21.9 GASTROESOPHAGEAL REFLUX DISEASE WITHOUT ESOPHAGITIS: ICD-10-CM

## 2025-08-11 DIAGNOSIS — G89.29 CHRONIC LOW BACK PAIN WITH LEFT-SIDED SCIATICA, UNSPECIFIED BACK PAIN LATERALITY: ICD-10-CM

## 2025-08-11 DIAGNOSIS — J45.30 MILD PERSISTENT ASTHMA WITHOUT COMPLICATION: ICD-10-CM

## 2025-08-11 DIAGNOSIS — F51.05 INSOMNIA DUE TO OTHER MENTAL DISORDER: ICD-10-CM

## 2025-08-11 DIAGNOSIS — I10 ESSENTIAL HYPERTENSION: Primary | ICD-10-CM

## 2025-08-11 DIAGNOSIS — F99 INSOMNIA DUE TO OTHER MENTAL DISORDER: ICD-10-CM

## 2025-08-11 DIAGNOSIS — M51.26 PROTRUSION OF LUMBAR INTERVERTEBRAL DISC: ICD-10-CM

## 2025-08-11 DIAGNOSIS — M54.42 CHRONIC LOW BACK PAIN WITH LEFT-SIDED SCIATICA, UNSPECIFIED BACK PAIN LATERALITY: ICD-10-CM

## 2025-08-11 DIAGNOSIS — F41.0 GENERALIZED ANXIETY DISORDER WITH PANIC ATTACKS: ICD-10-CM

## 2025-08-11 DIAGNOSIS — F41.1 GENERALIZED ANXIETY DISORDER WITH PANIC ATTACKS: ICD-10-CM

## 2025-08-11 DIAGNOSIS — F32.0 MILD SINGLE CURRENT EPISODE OF MAJOR DEPRESSIVE DISORDER: ICD-10-CM

## 2025-08-11 DIAGNOSIS — E66.813 CLASS 3 SEVERE OBESITY DUE TO EXCESS CALORIES WITH SERIOUS COMORBIDITY AND BODY MASS INDEX (BMI) OF 45.0 TO 49.9 IN ADULT (HCC): ICD-10-CM

## 2025-08-11 DIAGNOSIS — M48.061 LUMBAR FORAMINAL STENOSIS: ICD-10-CM

## 2025-08-11 PROCEDURE — 3079F DIAST BP 80-89 MM HG: CPT | Performed by: STUDENT IN AN ORGANIZED HEALTH CARE EDUCATION/TRAINING PROGRAM

## 2025-08-11 PROCEDURE — 3075F SYST BP GE 130 - 139MM HG: CPT | Performed by: STUDENT IN AN ORGANIZED HEALTH CARE EDUCATION/TRAINING PROGRAM

## 2025-08-11 PROCEDURE — 99214 OFFICE O/P EST MOD 30 MIN: CPT | Performed by: STUDENT IN AN ORGANIZED HEALTH CARE EDUCATION/TRAINING PROGRAM

## 2025-08-11 RX ORDER — OMEPRAZOLE 40 MG/1
40 CAPSULE, DELAYED RELEASE ORAL DAILY
Qty: 30 CAPSULE | Refills: 3 | Status: SHIPPED | OUTPATIENT
Start: 2025-08-11

## 2025-08-11 RX ORDER — MELOXICAM 15 MG/1
15 TABLET ORAL DAILY
Qty: 30 TABLET | Refills: 5 | Status: SHIPPED | OUTPATIENT
Start: 2025-08-11

## 2025-08-11 RX ORDER — GABAPENTIN 300 MG/1
300 CAPSULE ORAL 3 TIMES DAILY
Qty: 90 CAPSULE | Refills: 5 | Status: SHIPPED | OUTPATIENT
Start: 2025-08-11 | End: 2025-08-11

## 2025-08-11 RX ORDER — GABAPENTIN 100 MG/1
100 CAPSULE ORAL 3 TIMES DAILY
Qty: 90 CAPSULE | Refills: 0 | Status: SHIPPED | OUTPATIENT
Start: 2025-08-11 | End: 2025-09-10

## 2025-08-11 RX ORDER — ALPRAZOLAM 0.5 MG
0.5 TABLET ORAL NIGHTLY PRN
Qty: 25 TABLET | Refills: 3 | Status: SHIPPED | OUTPATIENT
Start: 2025-08-11 | End: 2026-02-07

## 2025-08-11 RX ORDER — MONTELUKAST SODIUM 10 MG/1
TABLET ORAL
Qty: 90 TABLET | Refills: 3 | Status: SHIPPED | OUTPATIENT
Start: 2025-08-11

## 2025-08-11 RX ORDER — TRAZODONE HYDROCHLORIDE 150 MG/1
150 TABLET ORAL NIGHTLY
Qty: 30 TABLET | Refills: 5 | Status: SHIPPED | OUTPATIENT
Start: 2025-08-11

## 2025-08-11 RX ORDER — FAMOTIDINE 20 MG/1
20 TABLET, FILM COATED ORAL 2 TIMES DAILY
Qty: 60 TABLET | Refills: 5 | Status: SHIPPED | OUTPATIENT
Start: 2025-08-11 | End: 2025-08-11

## 2025-08-11 RX ORDER — ALBUTEROL SULFATE 90 UG/1
2 INHALANT RESPIRATORY (INHALATION) EVERY 6 HOURS PRN
Qty: 25.5 G | Refills: 1 | Status: SHIPPED | OUTPATIENT
Start: 2025-08-11

## 2025-08-11 RX ORDER — AZELASTINE 1 MG/ML
1 SPRAY, METERED NASAL 2 TIMES DAILY
Qty: 60 ML | Refills: 1 | Status: SHIPPED | OUTPATIENT
Start: 2025-08-11

## 2025-08-11 RX ORDER — SERTRALINE HYDROCHLORIDE 100 MG/1
200 TABLET, FILM COATED ORAL DAILY
Qty: 180 TABLET | Refills: 1 | Status: SHIPPED | OUTPATIENT
Start: 2025-08-11

## 2025-08-11 RX ORDER — METOPROLOL TARTRATE 50 MG
50 TABLET ORAL 2 TIMES DAILY
Qty: 180 TABLET | Refills: 1 | Status: SHIPPED | OUTPATIENT
Start: 2025-08-11

## 2025-08-11 RX ORDER — CYCLOBENZAPRINE HCL 5 MG
5 TABLET ORAL 3 TIMES DAILY PRN
Qty: 60 TABLET | Refills: 5 | Status: SHIPPED | OUTPATIENT
Start: 2025-08-11

## 2025-08-11 RX ORDER — FLUTICASONE PROPIONATE 44 UG/1
2 AEROSOL, METERED RESPIRATORY (INHALATION) 2 TIMES DAILY
Qty: 10.6 G | Refills: 5 | Status: SHIPPED | OUTPATIENT
Start: 2025-08-11 | End: 2025-08-11

## 2025-08-11 RX ORDER — PHENTERMINE HYDROCHLORIDE 37.5 MG/1
37.5 TABLET ORAL
Qty: 30 TABLET | Refills: 0 | Status: SHIPPED | OUTPATIENT
Start: 2025-08-11 | End: 2025-09-10

## 2025-08-11 RX ORDER — TOPIRAMATE 25 MG/1
25 TABLET, FILM COATED ORAL NIGHTLY
Qty: 30 TABLET | Refills: 3 | Status: SHIPPED | OUTPATIENT
Start: 2025-08-11

## 2025-08-11 ASSESSMENT — ENCOUNTER SYMPTOMS
COUGH: 0
SHORTNESS OF BREATH: 0
VOMITING: 0
CHEST TIGHTNESS: 0
NAUSEA: 0